# Patient Record
Sex: FEMALE | Race: WHITE | NOT HISPANIC OR LATINO | Employment: OTHER | URBAN - METROPOLITAN AREA
[De-identification: names, ages, dates, MRNs, and addresses within clinical notes are randomized per-mention and may not be internally consistent; named-entity substitution may affect disease eponyms.]

---

## 2019-12-05 ENCOUNTER — ASSISTED LIVING VISIT (OUTPATIENT)
Dept: GERIATRICS | Facility: CLINIC | Age: 79
End: 2019-12-05
Payer: COMMERCIAL

## 2019-12-05 VITALS
RESPIRATION RATE: 18 BRPM | OXYGEN SATURATION: 93 % | WEIGHT: 138 LBS | DIASTOLIC BLOOD PRESSURE: 61 MMHG | SYSTOLIC BLOOD PRESSURE: 143 MMHG | HEART RATE: 53 BPM

## 2019-12-05 DIAGNOSIS — G30.1 LATE ONSET ALZHEIMER'S DISEASE WITHOUT BEHAVIORAL DISTURBANCE (H): ICD-10-CM

## 2019-12-05 DIAGNOSIS — F02.80 LATE ONSET ALZHEIMER'S DISEASE WITHOUT BEHAVIORAL DISTURBANCE (H): ICD-10-CM

## 2019-12-05 DIAGNOSIS — I10 ESSENTIAL HYPERTENSION: ICD-10-CM

## 2019-12-05 DIAGNOSIS — R29.6 RECURRENT FALLS: Primary | ICD-10-CM

## 2019-12-05 DIAGNOSIS — N39.0 RECURRENT UTI: ICD-10-CM

## 2019-12-05 DIAGNOSIS — Z71.89 ACP (ADVANCE CARE PLANNING): ICD-10-CM

## 2019-12-05 PROBLEM — R26.81 UNSTEADY GAIT: Status: ACTIVE | Noted: 2019-12-05

## 2019-12-05 RX ORDER — AMLODIPINE BESYLATE 5 MG/1
5 TABLET ORAL DAILY
COMMUNITY
End: 2020-01-03

## 2019-12-05 RX ORDER — ACETAMINOPHEN 500 MG
1000 TABLET ORAL 2 TIMES DAILY
COMMUNITY
End: 2019-12-05

## 2019-12-05 RX ORDER — VIT C/E/ZN/COPPR/LUTEIN/ZEAXAN 60 MG-6 MG
1 CAPSULE ORAL 2 TIMES DAILY
COMMUNITY
End: 2020-01-03

## 2019-12-05 RX ORDER — HYDROCHLOROTHIAZIDE 12.5 MG/1
12.5 TABLET ORAL DAILY
COMMUNITY
End: 2020-01-03

## 2019-12-05 RX ORDER — ATENOLOL 50 MG/1
50 TABLET ORAL DAILY
COMMUNITY
End: 2020-01-03

## 2019-12-05 RX ORDER — CHOLECALCIFEROL (VITAMIN D3) 50 MCG
1 TABLET ORAL DAILY
COMMUNITY
End: 2020-02-03

## 2019-12-05 RX ORDER — LISINOPRIL 20 MG/1
20 TABLET ORAL DAILY
COMMUNITY
End: 2020-01-03

## 2019-12-05 RX ORDER — ASPIRIN 81 MG/1
81 TABLET ORAL DAILY
COMMUNITY
End: 2020-02-03

## 2019-12-05 RX ORDER — CITALOPRAM HYDROBROMIDE 10 MG/1
10 TABLET ORAL DAILY
COMMUNITY
End: 2020-01-03

## 2019-12-05 RX ORDER — NITROFURANTOIN 25; 75 MG/1; MG/1
100 CAPSULE ORAL DAILY
COMMUNITY
End: 2020-01-03

## 2019-12-05 RX ORDER — ACETAMINOPHEN 500 MG
1000 TABLET ORAL 2 TIMES DAILY
COMMUNITY
End: 2020-01-03

## 2019-12-05 RX ORDER — OMEPRAZOLE 40 MG/1
40 CAPSULE, DELAYED RELEASE ORAL DAILY
COMMUNITY
End: 2020-01-03

## 2019-12-05 NOTE — LETTER
"    12/5/2019        RE: Judith M Trierweiler [facility]  [address]  [facility] [address]        [place] [facility]  PRIMARY CARE PROVIDER AND CLINIC:  JAYLAN Morales CNP, [address] / [place] [address]  Chief Complaint   Patient presents with     Establish Care     [place] Medical Record Number:  [id number]  Place of Service where encounter took place:  [facility] [[id number]]    Judith M Trierweiler  is a 79 year old  (1940), admitted to the above facility from home..  Admitted to this facility for  rehab, medical management and nursing care.    HPI:    HPI information obtained from: facility chart records, facility staff, patient report, [facility] chart review, Care Everywhere Epic chart review and family/first contact son report.   Brief Summary of Hospital Course:   Recurrent falls. 11/4/19 s/p fall with head trauma, laceration to occipital scalp area.  Head Ct done stable.  Most recent hosp. Stay 11/30/19-12/2/19 [facility].  Had urinary incont., weakness, dehydration.  Has had increased LE weakness. Slowed, shortened step gait.  Some diff. With turning.  Uses walker, gait belt with SBA.  New riser chair in room. Current staff assist with transfers.  Reports occ dizziness. 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     Alzheimer's. Memory loss, more so over past couple mos per son. Per chart, \"mild stroke\" 9/19 with some facial drooping. LUE weakness. Seen by Neurology, eval for possible NPH per previous head CT, brain MRI. Reported to have L pronator drift, no magnetic gait associated with NPH. [place] not to be NPH, rather repeat hosp. with toxic metabolic encephalopathy  Superimposed on deg. Brain syndrome. Cont. On celexa for depression. Reports mood gen. Stable. Has ongoing STML.  Neurologist did offer neuropsych referral, however family did not want.     HTN: lower BP yesterday.  Per son, has had lower BPs more " "recently.  Recent decreased hydrochlorothiazide dose per previous PCP.  Today BP stable. Somewhat bradycardic.  Cont. On hydrochlorothiazide, lisinopril, norvasc, atenolol. Has CKD stage 3.  Appears to have MYLES during 11/19 hosp. With BUN 51, CR 1.71.  Per labs done earlier this week Cr 1.2, Bun 42, GFR 40s.      Recurrent UTI. Per son recently tx for UTI-had\"too high\" dose of antbx, had tendonitis of R knee.  Had prednisone burst. Pain resolved.  Cont. On macrobid prophylaxis dose.  Afebrile.  Had occ urinary incont.  Wears depends. occ nocturia.    ACP: no polst in chart. Discussed with son-yuri, resident. Would like DNR/DNI        Updates on Status Since Skilled nursing Admission: no falls since admission yesterday. Mood, behaviors stable. Staff to start med admin today-farhan had been giving meds.     CODE STATUS/ADVANCE DIRECTIVES DISCUSSION:   DNR / DNI  Patient's living condition: lived with spouse  ALLERGIES: Penicillins  PAST MEDICAL HISTORY:  has a past medical history of Arthritis, Cataract, Cerebral infarction (H), CKD (chronic kidney disease), Essential hypertension, benign, Gastroesophageal reflux disease, Gout, Hemorrhoid, History of duodenal ulcer, History of DVT (deep vein thrombosis), History of GI bleed, Hyperlipidemia, Macular degeneration, Psoriasis, and Vitreous hemorrhage (H).  PAST SURGICAL HISTORY:   has a past surgical history that includes NONSPECIFIC PROCEDURE; NONSPECIFIC PROCEDURE; and CATARACT.  FAMILY HISTORY: family history includes Cerebrovascular Disease in her mother; Coronary Artery Disease in her father; Hypertension in her mother; Kidney Disease in her mother.  SOCIAL HISTORY:   reports that she has never smoked. She has never used smokeless tobacco.    Post Discharge Medication Reconciliation Status: discharge medications reconciled and changed, per note/orders (see AVS)    Current Outpatient Medications   Medication Sig Dispense Refill     acetaminophen (TYLENOL) 500 MG tablet " Take 500-1,000 mg by mouth 2 times daily And every day prn       amLODIPine (NORVASC) 5 MG tablet Take 5 mg by mouth daily       aspirin 81 MG EC tablet Take 81 mg by mouth daily       atenolol (TENORMIN) 50 MG tablet Take 50 mg by mouth daily       citalopram (CELEXA) 10 MG tablet Take 10 mg by mouth daily       hydrochlorothiazide (HYDRODIURIL) 12.5 MG tablet Take 12.5 mg by mouth daily       lisinopril (PRINIVIL/ZESTRIL) 20 MG tablet Take 20 mg by mouth daily       multivitamin  with lutein (OCUVITE WITH LTEIN) CAPS per capsule Take 1 capsule by mouth 2 times daily       nitroFURantoin macrocrystal-monohydrate (MACROBID) 100 MG capsule Take 100 mg by mouth daily       omeprazole (PRILOSEC) 40 MG DR capsule Take 40 mg by mouth daily       vitamin D3 (CHOLECALCIFEROL) 2000 units (50 mcg) tablet Take 1 tablet by mouth daily         ROS:  CONSTITUTIONAL:  weight loss, fatigue, body aches, dizziness and forgetfulness, EYES:  glasses or contacts and macular deg., ENT:  [place], CV:  lower extremity edema, RESPIRATORY: neg, :  incontinence and Nocturia, GI:  occ GERD, occ diff. swallowing pills, NEURO:  weakness, PSYCH: neg, MUSCULOSKELETAL: muscular weakness and occ back pain and SKIN: neg    Vitals:  BP (!) 143/61   Pulse 53   Resp 18   Wt 62.6 kg (138 lb)   SpO2 93%   Exam:  GENERAL APPEARANCE:  Alert, in no distress, cooperative  ENT:  Mouth and posterior oropharynx normal, moist mucous membranes, [place], adequate dentition  EYES:  EOM, conjunctivae, lids, pupils and irises normal, PERRL, no drainage  NECK:  No adenopathy,masses or thyromegaly, no caroitd bruit, FROM  RESP:  respiratory effort and palpation of chest normal, lungs clear to auscultation , no respiratory distress  CV:  Palpation and auscultation of heart done , regular rate and rhythm, no murmur, rub, or gallop, peripheral edema 1+ in ankles, L>R  ABDOMEN:  normal bowel sounds, soft, nontender, no hepatosplenomegaly or other masses, no guarding or  rebound, no bruits  M/S:   short steps, slowed gait, no shuffling. some diff. with turning. muscle strength 5/5 all 4 ext., normal tone  SKIN:  Inspection of skin and subcutaneous tissue baseline, Palpation of skin and subcutaneous tissue baseline  NEURO:   Cranial nerves 2-12 are normal tested and grossly at patient's baseline, alert, speech clear  PSYCH:  memory impaired , affect and mood normal, no apparent anxiety    Lab/Diagnostic data:  Recent labs in [facility] reviewed by me today.     ASSESSMENT/PLAN:  Recurrent falls  Ongoing. Fall last month with hosp. Stay. Head trauma. Head imaging stable.  1. Refer to PT  2. Gait belt, 4ww for amb.  3. Cont. W.c., stand/pivot transfers  4. Monitor for attempts to self-transfer, falls    Late onset Alzheimer's disease without behavioral disturbance (H)  STML. S/p CVA. Recent neurology visit.  1. Cont. celexa  2. Monitor for changes in mood, behaviors  3. Monitor for changes in Neuros, focal neurological changes  4. F/u with Neurology on prn basis  5. Cont. Staff assist with ADLs, medication admin    Essential hypertension  Recent loser BPs, stable today. CKD stage 3  1. Cont. Norvasc, lisinopril, atenolol, hydrochlorothiazide  2. BPs, HRs bid x 7 days-staff to notify PCP for HR<54, SBP<100  3. For further lower BPs, may discontinue norvasc  4. For further bradycardia, may decrease atenolol  5. Cbc, bmp next week. If Cr>1.4, may decrease lisinopril, discontinue  Hydrochlorothiazide   6. Monitor for dizziness, encourage fluids    Recurrent UTI  UA done 11/30/19 hosp.stay neg.  Cont. On macrobid prophylaxis  1. Monitor for fever  2. Monitor for increased nocturia  3. Cont. Incont. Briefs  4. Son to make Urology appt  5. Cbc next week as above    ACP (advance care planning)  Spoke with resident, son-poa. DNR/DNI. POLST in chart       Electronically signed by:  JAYLAN Morales CNP                       Sincerely,        JAYLAN Morales CNP

## 2019-12-05 NOTE — PROGRESS NOTES
"[place] [facility]  PRIMARY CARE PROVIDER AND CLINIC:  Rylan Farris, APRN CNP, [address] / [place] [address]  Chief Complaint   Patient presents with     Establish Care     [place] Medical Record Number:  [id number]  Place of Service where encounter took place:  [facility]  [employer] [facility] [[id number]]    Judith M Trierweiler  is a 79 year old  (1940), admitted to the above facility from home..  Admitted to this facility for  rehab, medical management and nursing care.    HPI:    HPI information obtained from: facility chart records, facility staff, patient report, [facility] chart review, Care Everywhere Epic chart review and family/first contact son report.   Brief Summary of Hospital Course:   Recurrent falls. 11/4/19 s/p fall with head trauma, laceration to occipital scalp area.  Head Ct done stable.  Most recent hosp. Stay 11/30/19-12/2/19 [facility]. [facility].  Had urinary incont., weakness, dehydration.  Has had increased LE weakness. Slowed, shortened step gait.  Some diff. With turning.  Uses walker, gait belt with SBA.  New riser chair in room. Current staff assist with transfers.  Reports occ dizziness. 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     Alzheimer's. Memory loss, more so over past couple mos per son. Per chart, \"mild stroke\" 9/19 with some facial drooping. LUE weakness. Seen by Neurology, eval for possible NPH per previous head CT, brain MRI. Reported to have L pronator drift, no magnetic gait associated with NPH. [place] not to be NPH, rather repeat hosp. with toxic metabolic encephalopathy  Superimposed on deg. Brain syndrome. Cont. On celexa for depression. Reports mood gen. Stable. Has ongoing STML.  Neurologist did offer neuropsych referral, however family did not want.     HTN: lower BP yesterday.  Per son, has had lower BPs more recently.  Recent decreased hydrochlorothiazide dose per previous PCP.  Today BP stable. Somewhat bradycardic.  " "Cont. On hydrochlorothiazide, lisinopril, norvasc, atenolol. Has CKD stage 3.  Appears to have MYLES during 11/19 hosp. With BUN 51, CR 1.71.  Per labs done earlier this week Cr 1.2, Bun 42, GFR 40s.      Recurrent UTI. Per son recently tx for UTI-had\"too high\" dose of antbx, had tendonitis of R knee.  Had prednisone burst. Pain resolved.  Cont. On macrobid prophylaxis dose.  Afebrile.  Had occ urinary incont.  Wears depends. occ nocturia.    ACP: no polst in chart. Discussed with son-yuri, resident. Would like DNR/DNI        Updates on Status Since Skilled nursing Admission: no falls since admission yesterday. Mood, behaviors stable. Staff to start med admin today-farhan had been giving meds.     CODE STATUS/ADVANCE DIRECTIVES DISCUSSION:   DNR / DNI  Patient's living condition: lived with spouse  ALLERGIES: Penicillins  PAST MEDICAL HISTORY:  has a past medical history of Arthritis, Cataract, Cerebral infarction (H), CKD (chronic kidney disease), Essential hypertension, benign, Gastroesophageal reflux disease, Gout, Hemorrhoid, History of duodenal ulcer, History of DVT (deep vein thrombosis), History of GI bleed, Hyperlipidemia, Macular degeneration, Psoriasis, and Vitreous hemorrhage (H).  PAST SURGICAL HISTORY:   has a past surgical history that includes NONSPECIFIC PROCEDURE; NONSPECIFIC PROCEDURE; and CATARACT.  FAMILY HISTORY: family history includes Cerebrovascular Disease in her mother; Coronary Artery Disease in her father; Hypertension in her mother; Kidney Disease in her mother.  SOCIAL HISTORY:   reports that she has never smoked. She has never used smokeless tobacco.    Post Discharge Medication Reconciliation Status: discharge medications reconciled and changed, per note/orders (see AVS)    Current Outpatient Medications   Medication Sig Dispense Refill     acetaminophen (TYLENOL) 500 MG tablet Take 500-1,000 mg by mouth 2 times daily And every day prn       amLODIPine (NORVASC) 5 MG tablet Take 5 mg by " mouth daily       aspirin 81 MG EC tablet Take 81 mg by mouth daily       atenolol (TENORMIN) 50 MG tablet Take 50 mg by mouth daily       citalopram (CELEXA) 10 MG tablet Take 10 mg by mouth daily       hydrochlorothiazide (HYDRODIURIL) 12.5 MG tablet Take 12.5 mg by mouth daily       lisinopril (PRINIVIL/ZESTRIL) 20 MG tablet Take 20 mg by mouth daily       multivitamin  with lutein (OCUVITE WITH LTEIN) CAPS per capsule Take 1 capsule by mouth 2 times daily       nitroFURantoin macrocrystal-monohydrate (MACROBID) 100 MG capsule Take 100 mg by mouth daily       omeprazole (PRILOSEC) 40 MG DR capsule Take 40 mg by mouth daily       vitamin D3 (CHOLECALCIFEROL) 2000 units (50 mcg) tablet Take 1 tablet by mouth daily         ROS:  CONSTITUTIONAL:  weight loss, fatigue, body aches, dizziness and forgetfulness, EYES:  glasses or contacts and macular deg., ENT:  [place], CV:  lower extremity edema, RESPIRATORY: neg, :  incontinence and Nocturia, GI:  occ GERD, occ diff. swallowing pills, NEURO:  weakness, PSYCH: neg, MUSCULOSKELETAL: muscular weakness and occ back pain and SKIN: neg    Vitals:  BP (!) 143/61   Pulse 53   Resp 18   Wt 62.6 kg (138 lb)   SpO2 93%   Exam:  GENERAL APPEARANCE:  Alert, in no distress, cooperative  ENT:  Mouth and posterior oropharynx normal, moist mucous membranes, [place], adequate dentition  EYES:  EOM, conjunctivae, lids, pupils and irises normal, PERRL, no drainage  NECK:  No adenopathy,masses or thyromegaly, no caroitd bruit, FROM  RESP:  respiratory effort and palpation of chest normal, lungs clear to auscultation , no respiratory distress  CV:  Palpation and auscultation of heart done , regular rate and rhythm, no murmur, rub, or gallop, peripheral edema 1+ in ankles, L>R  ABDOMEN:  normal bowel sounds, soft, nontender, no hepatosplenomegaly or other masses, no guarding or rebound, no bruits  M/S:   short steps, slowed gait, no shuffling. some diff. with turning. muscle strength 5/5 all  4 ext., normal tone  SKIN:  Inspection of skin and subcutaneous tissue baseline, Palpation of skin and subcutaneous tissue baseline  NEURO:   Cranial nerves 2-12 are normal tested and grossly at patient's baseline, alert, speech clear  PSYCH:  memory impaired , affect and mood normal, no apparent anxiety    Lab/Diagnostic data:  Recent labs in [facility] reviewed by me today.     ASSESSMENT/PLAN:  Recurrent falls  Ongoing. Fall last month with hosp. Stay. Head trauma. Head imaging stable.  1. Refer to PT  2. Gait belt, 4ww for amb.  3. Cont. W.c., stand/pivot transfers  4. Monitor for attempts to self-transfer, falls    Late onset Alzheimer's disease without behavioral disturbance (H)  STML. S/p CVA. Recent neurology visit.  1. Cont. celexa  2. Monitor for changes in mood, behaviors  3. Monitor for changes in Neuros, focal neurological changes  4. F/u with Neurology on prn basis  5. Cont. Staff assist with ADLs, medication admin    Essential hypertension  Recent loser BPs, stable today. CKD stage 3  1. Cont. Norvasc, lisinopril, atenolol, hydrochlorothiazide  2. BPs, HRs bid x 7 days-staff to notify PCP for HR<54, SBP<100  3. For further lower BPs, may discontinue norvasc  4. For further bradycardia, may decrease atenolol  5. Cbc, bmp next week. If Cr>1.4, may decrease lisinopril, discontinue  Hydrochlorothiazide   6. Monitor for dizziness, encourage fluids    Recurrent UTI  UA done 11/30/19 hosp.stay neg.  Cont. On macrobid prophylaxis  1. Monitor for fever  2. Monitor for increased nocturia  3. Cont. Incont. Briefs  4. Son to make Urology appt  5. Cbc next week as above    ACP (advance care planning)  Spoke with resident, son-poa. DNR/DNI. POLST in chart       Electronically signed by:  JAYLAN Morales CNP

## 2019-12-08 ENCOUNTER — TELEPHONE (OUTPATIENT)
Dept: GERIATRICS | Facility: CLINIC | Age: 79
End: 2019-12-08

## 2019-12-08 ENCOUNTER — RECORDS - HEALTHEAST (OUTPATIENT)
Dept: LAB | Facility: CLINIC | Age: 79
End: 2019-12-08

## 2019-12-08 LAB
ALBUMIN UR-MCNC: ABNORMAL MG/DL
APPEARANCE UR: CLEAR
BACTERIA #/AREA URNS HPF: ABNORMAL HPF
BILIRUB UR QL STRIP: NEGATIVE
COLOR UR AUTO: YELLOW
GLUCOSE UR STRIP-MCNC: NEGATIVE MG/DL
HGB UR QL STRIP: NEGATIVE
KETONES UR STRIP-MCNC: NEGATIVE MG/DL
LEUKOCYTE ESTERASE UR QL STRIP: ABNORMAL
MUCOUS THREADS #/AREA URNS LPF: ABNORMAL LPF
NITRATE UR QL: NEGATIVE
PH UR STRIP: 5 [PH] (ref 4.5–8)
RBC #/AREA URNS AUTO: ABNORMAL HPF
SP GR UR STRIP: 1.02 (ref 1–1.03)
SQUAMOUS #/AREA URNS AUTO: ABNORMAL LPF
UROBILINOGEN UR STRIP-ACNC: ABNORMAL
WBC #/AREA URNS AUTO: ABNORMAL HPF

## 2019-12-08 NOTE — PROGRESS NOTES
Patient with presence of urinary incontinence, malodorous urine and increased confusion. Staff requesting UA/UC.    Dr. Misty Davis, APRN, DNP

## 2019-12-09 LAB — BACTERIA SPEC CULT: NORMAL

## 2019-12-10 ENCOUNTER — RECORDS - HEALTHEAST (OUTPATIENT)
Dept: LAB | Facility: CLINIC | Age: 79
End: 2019-12-10

## 2019-12-10 ENCOUNTER — TRANSFERRED RECORDS (OUTPATIENT)
Dept: HEALTH INFORMATION MANAGEMENT | Facility: CLINIC | Age: 79
End: 2019-12-10

## 2019-12-10 LAB
ANION GAP SERPL CALCULATED.3IONS-SCNC: 7 MMOL/L (ref 5–18)
ANION GAP SERPL CALCULATED.3IONS-SCNC: 7 MMOL/L (ref 5–18)
BASOPHILS # BLD AUTO: 0.1 THOU/UL (ref 0–0.2)
BASOPHILS NFR BLD AUTO: 1 % (ref 0–2)
BUN SERPL-MCNC: 28 MG/DL (ref 8–28)
BUN SERPL-MCNC: 28 MG/DL (ref 8–28)
CALCIUM SERPL-MCNC: 9 MG/DL (ref 8.5–10.5)
CALCIUM SERPL-MCNC: 9 MG/DL (ref 8.5–10.5)
CHLORIDE BLD-SCNC: 110 MMOL/L (ref 98–107)
CHLORIDE SERPLBLD-SCNC: 110 MMOL/L (ref 98–107)
CO2 SERPL-SCNC: 21 MMOL/L (ref 22–31)
CO2 SERPL-SCNC: 21 MMOL/L (ref 22–31)
CREAT SERPL-MCNC: 1.31 MG/DL (ref 0.6–1.1)
CREAT SERPL-MCNC: 1.31 MG/DL (ref 0.6–1.1)
EOSINOPHIL # BLD AUTO: 0.3 THOU/UL (ref 0–0.4)
EOSINOPHIL NFR BLD AUTO: 5 % (ref 0–6)
ERYTHROCYTE [DISTWIDTH] IN BLOOD BY AUTOMATED COUNT: 14.3 % (ref 11–14.5)
GFR SERPL CREATININE-BSD FRML MDRD: 39 ML/MIN/1.73M2
GFR SERPL CREATININE-BSD FRML MDRD: 39 ML/MIN/1.73M2
GLUCOSE BLD-MCNC: 74 MG/DL (ref 70–125)
GLUCOSE SERPL-MCNC: 74 MG/DL (ref 70–125)
HCT VFR BLD AUTO: 33.5 % (ref 35–47)
HGB BLD-MCNC: 10.6 G/DL (ref 12–16)
LYMPHOCYTES # BLD AUTO: 1.2 THOU/UL (ref 0.8–4.4)
LYMPHOCYTES NFR BLD AUTO: 18 % (ref 20–40)
MCH RBC QN AUTO: 31.7 PG (ref 27–34)
MCHC RBC AUTO-ENTMCNC: 31.6 G/DL (ref 32–36)
MCV RBC AUTO: 100 FL (ref 80–100)
MONOCYTES # BLD AUTO: 0.6 THOU/UL (ref 0–0.9)
MONOCYTES NFR BLD AUTO: 9 % (ref 2–10)
NEUTROPHILS # BLD AUTO: 4.4 THOU/UL (ref 2–7.7)
NEUTROPHILS NFR BLD AUTO: 67 % (ref 50–70)
PLATELET # BLD AUTO: 266 THOU/UL (ref 140–440)
PMV BLD AUTO: 10.9 FL (ref 8.5–12.5)
POTASSIUM BLD-SCNC: 4.8 MMOL/L (ref 3.5–5)
POTASSIUM SERPL-SCNC: 4.8 MMOL/L (ref 3.5–5)
RBC # BLD AUTO: 3.34 MILL/UL (ref 3.8–5.4)
SODIUM SERPL-SCNC: 138 MMOL/L (ref 136–145)
SODIUM SERPL-SCNC: 138 MMOL/L (ref 136–145)
WBC: 6.5 THOU/UL (ref 4–11)

## 2019-12-16 ENCOUNTER — ASSISTED LIVING VISIT (OUTPATIENT)
Dept: GERIATRICS | Facility: CLINIC | Age: 79
End: 2019-12-16
Payer: COMMERCIAL

## 2019-12-16 VITALS
TEMPERATURE: 97.8 F | DIASTOLIC BLOOD PRESSURE: 59 MMHG | OXYGEN SATURATION: 96 % | HEART RATE: 57 BPM | RESPIRATION RATE: 16 BRPM | SYSTOLIC BLOOD PRESSURE: 133 MMHG

## 2019-12-16 DIAGNOSIS — I10 ESSENTIAL HYPERTENSION: ICD-10-CM

## 2019-12-16 DIAGNOSIS — R29.6 RECURRENT FALLS: Primary | ICD-10-CM

## 2019-12-16 DIAGNOSIS — R19.7 DIARRHEA, UNSPECIFIED TYPE: ICD-10-CM

## 2019-12-16 RX ORDER — LOPERAMIDE HCL 2 MG
2 CAPSULE ORAL 2 TIMES DAILY PRN
COMMUNITY
End: 2020-02-03

## 2019-12-16 NOTE — PROGRESS NOTES
[place] [facility]  [place] Medical Record Number:  [id number]  Place of Service where encounter took place:  [facility]  [facility] ([facility]) [[id number]]  Chief Complaint   Patient presents with     Fall       HPI:    Judith M Trierweiler  is a 79 year old (1940), who is being seen today for an episodic care visit.  HPI information obtained from: facility chart records, facility staff, patient report and [facility] chart review. Today's concern is: falls, HTN, diarrhea.  S/p fall 12/14/19, no apparent inj.  Has had recurrent falls in apt. Self-transfers in room. Falls appear mechanical in nature. Does report occ dizziness.  Had lower BP upon admission. BPs have been gen. Stable, as has HR.  For HTN cont. On norvasc, atenolol, hydrochlorothiazide, lisinopril.  Cont. With PT for LE strengthening. Last week had episodes of diarrhea.  Started on prn imodium.  Reports no diarrhea past few days.       Past Medical and Surgical History reviewed in Epic today.    MEDICATIONS:  Current Outpatient Medications   Medication Sig Dispense Refill     acetaminophen (TYLENOL) 500 MG tablet Take 1,000 mg by mouth 2 times daily And every day prn        amLODIPine (NORVASC) 5 MG tablet Take 5 mg by mouth daily       aspirin 81 MG EC tablet Take 81 mg by mouth daily       atenolol (TENORMIN) 50 MG tablet Take 50 mg by mouth daily       citalopram (CELEXA) 10 MG tablet Take 10 mg by mouth daily       hydrochlorothiazide (HYDRODIURIL) 12.5 MG tablet Take 12.5 mg by mouth daily       lisinopril (PRINIVIL/ZESTRIL) 20 MG tablet Take 20 mg by mouth daily       loperamide (IMODIUM) 2 MG capsule Take 2 mg by mouth 2 times daily as needed for diarrhea       multivitamin  with lutein (OCUVITE WITH LTEIN) CAPS per capsule Take 1 capsule by mouth 2 times daily       nitroFURantoin macrocrystal-monohydrate (MACROBID) 100 MG capsule Take 100 mg by mouth daily       omeprazole (PRILOSEC) 40 MG DR capsule Take 40 mg by mouth  daily       vitamin D3 (CHOLECALCIFEROL) 2000 units (50 mcg) tablet Take 1 tablet by mouth daily           REVIEW OF SYSTEMS:  Unobtainable secondary to cognitive impairment. Reports feeling fine today. No pains    Objective:  /59   Pulse 57   Temp 97.8  F (36.6  C)   Resp 16   SpO2 96%   Exam:  GENERAL APPEARANCE:  Alert, in no distress, cooperative  ENT:  Mouth and posterior oropharynx normal, moist mucous membranes, [place]  EYES:  EOM, conjunctivae, lids, pupils and irises normal, PERRL  NECK:  No adenopathy,masses or thyromegaly, FROM  RESP:  respiratory effort and palpation of chest normal, lungs clear to auscultation , no respiratory distress  CV:  Palpation and auscultation of heart done , regular rate and rhythm, no murmur, rub, or gallop, peripheral edema trace-1+ in LEs, L>R  ABDOMEN:  normal bowel sounds, soft, nontender, no hepatosplenomegaly or other masses, no guarding or rebound  M/S:   gait steady with walker. short steps. normal muscle tone  NEURO:   Cranial nerves 2-12 are normal tested and grossly at patient's baseline, alert, speech clear  PSYCH:  insight and judgement impaired, memory impaired , affect and mood normal    Labs:     Most Recent 3 BMP's:  Recent Labs   Lab Test 12/10/19      POTASSIUM 4.8   CHLORIDE 110*   CO2 21*   BUN 28   CR 1.31*   ANIONGAP 7   SLIME 9.0   GLC 74       ASSESSMENT/PLAN:  Recurrent falls  S/p fall 12/14/19. No apparent inj.  Dementia with memory loss  1. Remind to use walker at all times  2. Cont. PT  3. Monitor for diff. With transfers, further increased LE weakness  4. Monitor for further c/o dizziness. Encourage fluids    Essential hypertension  BP stable. Sl bradycardia at times  1. Cont. Norvasc, atenolol, lisinopril, hydrochlorothiazide  2. Follow BPs, HRs  3. Cbc, bmp tomorrow  4. For further elevated cr., decrease hydrochlorothiazide and or lisinopril  5. For increased bradycardia, decrease atenolol    Diarrhea, unspecified type  Currently  resolved  1. Cont. Prn imodium  2. Encourage fluids as above  3. For further increased freq. Of diarrhea, may sched. imodium      Electronically signed by:  JAYLAN Morales CNP

## 2019-12-16 NOTE — LETTER
12/16/2019        RE: Judith M Trierweiler [facility] [place] [address]  [facility] [address]        [facility]  [facility] Record Number:  [id number]  Place of Service where encounter took place:  [facility] ([facility]) [[id number]]  Chief Complaint   Patient presents with     Fall       HPI:    Judith M Trierweiler  is a 79 year old (1940), who is being seen today for an episodic care visit.  HPI information obtained from: facility chart records, facility staff, patient report and [facility] chart review. Today's concern is: falls, HTN, diarrhea.  S/p fall 12/14/19, no apparent inj.  Has had recurrent falls in apt. Self-transfers in room. Falls appear mechanical in nature. Does report occ dizziness.  Had lower BP upon admission. BPs have been gen. Stable, as has HR.  For HTN cont. On norvasc, atenolol, hydrochlorothiazide, lisinopril.  Cont. With PT for LE strengthening. Last week had episodes of diarrhea.  Started on prn imodium.  Reports no diarrhea past few days.       Past Medical and Surgical History reviewed in Epic today.    MEDICATIONS:  Current Outpatient Medications   Medication Sig Dispense Refill     acetaminophen (TYLENOL) 500 MG tablet Take 1,000 mg by mouth 2 times daily And every day prn        amLODIPine (NORVASC) 5 MG tablet Take 5 mg by mouth daily       aspirin 81 MG EC tablet Take 81 mg by mouth daily       atenolol (TENORMIN) 50 MG tablet Take 50 mg by mouth daily       citalopram (CELEXA) 10 MG tablet Take 10 mg by mouth daily       hydrochlorothiazide (HYDRODIURIL) 12.5 MG tablet Take 12.5 mg by mouth daily       lisinopril (PRINIVIL/ZESTRIL) 20 MG tablet Take 20 mg by mouth daily       loperamide (IMODIUM) 2 MG capsule Take 2 mg by mouth 2 times daily as needed for diarrhea       multivitamin  with lutein (OCUVITE WITH LTEIN) CAPS per capsule Take 1 capsule by mouth 2 times daily       nitroFURantoin macrocrystal-monohydrate  (MACROBID) 100 MG capsule Take 100 mg by mouth daily       omeprazole (PRILOSEC) 40 MG DR capsule Take 40 mg by mouth daily       vitamin D3 (CHOLECALCIFEROL) 2000 units (50 mcg) tablet Take 1 tablet by mouth daily           REVIEW OF SYSTEMS:  Unobtainable secondary to cognitive impairment. Reports feeling fine today. No pains    Objective:  /59   Pulse 57   Temp 97.8  F (36.6  C)   Resp 16   SpO2 96%   Exam:  GENERAL APPEARANCE:  Alert, in no distress, cooperative  ENT:  Mouth and posterior oropharynx normal, moist mucous membranes, [place]  EYES:  EOM, conjunctivae, lids, pupils and irises normal, PERRL  NECK:  No adenopathy,masses or thyromegaly, FROM  RESP:  respiratory effort and palpation of chest normal, lungs clear to auscultation , no respiratory distress  CV:  Palpation and auscultation of heart done , regular rate and rhythm, no murmur, rub, or gallop, peripheral edema trace-1+ in LEs, L>R  ABDOMEN:  normal bowel sounds, soft, nontender, no hepatosplenomegaly or other masses, no guarding or rebound  M/S:   gait steady with walker. short steps. normal muscle tone  NEURO:   Cranial nerves 2-12 are normal tested and grossly at patient's baseline, alert, speech clear  PSYCH:  insight and judgement impaired, memory impaired , affect and mood normal    Labs:     Most Recent 3 BMP's:  Recent Labs   Lab Test 12/10/19      POTASSIUM 4.8   CHLORIDE 110*   CO2 21*   BUN 28   CR 1.31*   ANIONGAP 7   SLIME 9.0   GLC 74       ASSESSMENT/PLAN:  Recurrent falls  S/p fall 12/14/19. No apparent inj.  Dementia with memory loss  1. Remind to use walker at all times  2. Cont. PT  3. Monitor for diff. With transfers, further increased LE weakness  4. Monitor for further c/o dizziness. Encourage fluids    Essential hypertension  BP stable. Sl bradycardia at times  1. Cont. Norvasc, atenolol, lisinopril, hydrochlorothiazide  2. Follow BPs, HRs  3. Cbc, bmp tomorrow  4. For further elevated cr., decrease  hydrochlorothiazide and or lisinopril  5. For increased bradycardia, decrease atenolol    Diarrhea, unspecified type  Currently resolved  1. Cont. Prn imodium  2. Encourage fluids as above  3. For further increased freq. Of diarrhea, may sched. imodium      Electronically signed by:  JAYLAN Morales CNP             Sincerely,        JAYLAN Morales CNP

## 2019-12-18 ENCOUNTER — RECORDS - HEALTHEAST (OUTPATIENT)
Dept: LAB | Facility: CLINIC | Age: 79
End: 2019-12-18

## 2019-12-18 LAB
ANION GAP SERPL CALCULATED.3IONS-SCNC: 6 MMOL/L (ref 5–18)
BUN SERPL-MCNC: 24 MG/DL (ref 8–28)
CALCIUM SERPL-MCNC: 9.7 MG/DL (ref 8.5–10.5)
CHLORIDE BLD-SCNC: 109 MMOL/L (ref 98–107)
CO2 SERPL-SCNC: 22 MMOL/L (ref 22–31)
CREAT SERPL-MCNC: 1.25 MG/DL (ref 0.6–1.1)
ERYTHROCYTE [DISTWIDTH] IN BLOOD BY AUTOMATED COUNT: 14 % (ref 11–14.5)
GFR SERPL CREATININE-BSD FRML MDRD: 41 ML/MIN/1.73M2
GLUCOSE BLD-MCNC: 88 MG/DL (ref 70–125)
HCT VFR BLD AUTO: 34.7 % (ref 35–47)
HGB BLD-MCNC: 11 G/DL (ref 12–16)
MCH RBC QN AUTO: 31.7 PG (ref 27–34)
MCHC RBC AUTO-ENTMCNC: 31.7 G/DL (ref 32–36)
MCV RBC AUTO: 100 FL (ref 80–100)
PLATELET # BLD AUTO: 226 THOU/UL (ref 140–440)
PMV BLD AUTO: 10.5 FL (ref 8.5–12.5)
POTASSIUM BLD-SCNC: 4.6 MMOL/L (ref 3.5–5)
RBC # BLD AUTO: 3.47 MILL/UL (ref 3.8–5.4)
SODIUM SERPL-SCNC: 137 MMOL/L (ref 136–145)
WBC: 8.1 THOU/UL (ref 4–11)

## 2019-12-19 ENCOUNTER — RECORDS - HEALTHEAST (OUTPATIENT)
Dept: LAB | Facility: CLINIC | Age: 79
End: 2019-12-19

## 2019-12-19 LAB
ANION GAP SERPL CALCULATED.3IONS-SCNC: 6 MMOL/L (ref 5–18)
BUN SERPL-MCNC: 26 MG/DL (ref 8–28)
CALCIUM SERPL-MCNC: 9.2 MG/DL (ref 8.5–10.5)
CHLORIDE BLD-SCNC: 109 MMOL/L (ref 98–107)
CO2 SERPL-SCNC: 22 MMOL/L (ref 22–31)
CREAT SERPL-MCNC: 1.25 MG/DL (ref 0.6–1.1)
ERYTHROCYTE [DISTWIDTH] IN BLOOD BY AUTOMATED COUNT: 14 % (ref 11–14.5)
GFR SERPL CREATININE-BSD FRML MDRD: 41 ML/MIN/1.73M2
GLUCOSE BLD-MCNC: 80 MG/DL (ref 70–125)
HCT VFR BLD AUTO: 32.3 % (ref 35–47)
HGB BLD-MCNC: 9.9 G/DL (ref 12–16)
MCH RBC QN AUTO: 31.1 PG (ref 27–34)
MCHC RBC AUTO-ENTMCNC: 30.7 G/DL (ref 32–36)
MCV RBC AUTO: 102 FL (ref 80–100)
PLATELET # BLD AUTO: 198 THOU/UL (ref 140–440)
PMV BLD AUTO: 11.1 FL (ref 8.5–12.5)
POTASSIUM BLD-SCNC: 4.9 MMOL/L (ref 3.5–5)
RBC # BLD AUTO: 3.18 MILL/UL (ref 3.8–5.4)
SODIUM SERPL-SCNC: 137 MMOL/L (ref 136–145)
WBC: 5.8 THOU/UL (ref 4–11)

## 2020-01-02 DIAGNOSIS — E56.9 VITAMIN DEFICIENCY: ICD-10-CM

## 2020-01-02 DIAGNOSIS — F32.A DEPRESSION, UNSPECIFIED DEPRESSION TYPE: ICD-10-CM

## 2020-01-02 DIAGNOSIS — K21.9 GASTROESOPHAGEAL REFLUX DISEASE, ESOPHAGITIS PRESENCE NOT SPECIFIED: ICD-10-CM

## 2020-01-02 DIAGNOSIS — R52 PAIN: Primary | ICD-10-CM

## 2020-01-02 DIAGNOSIS — I10 ESSENTIAL HYPERTENSION: ICD-10-CM

## 2020-01-02 DIAGNOSIS — Z29.89 NEED FOR PROPHYLAXIS AGAINST URINARY TRACT INFECTION: ICD-10-CM

## 2020-01-03 RX ORDER — ATENOLOL 50 MG/1
TABLET ORAL
Qty: 30 TABLET | Refills: 11 | Status: SHIPPED | OUTPATIENT
Start: 2020-01-03 | End: 2020-02-03

## 2020-01-03 RX ORDER — OMEPRAZOLE 40 MG/1
CAPSULE, DELAYED RELEASE ORAL
Qty: 30 CAPSULE | Refills: 11 | Status: SHIPPED | OUTPATIENT
Start: 2020-01-03 | End: 2020-02-03 | Stop reason: DRUGHIGH

## 2020-01-03 RX ORDER — ASPIRIN 81 MG
TABLET,CHEWABLE ORAL
Qty: 30 TABLET | Refills: 11 | Status: SHIPPED | OUTPATIENT
Start: 2020-01-03 | End: 2020-12-01

## 2020-01-03 RX ORDER — PSEUDOEPHED/ACETAMINOPH/DIPHEN 30MG-500MG
TABLET ORAL
Qty: 120 TABLET | Refills: 11 | Status: SHIPPED | OUTPATIENT
Start: 2020-01-03 | End: 2020-12-01

## 2020-01-03 RX ORDER — CHOLECALCIFEROL (VITAMIN D3) 25 MCG
TABLET ORAL
Qty: 30 TABLET | Refills: 11 | Status: SHIPPED | OUTPATIENT
Start: 2020-01-03 | End: 2020-12-01

## 2020-01-03 RX ORDER — NITROFURANTOIN 25; 75 MG/1; MG/1
CAPSULE ORAL
Qty: 30 CAPSULE | Refills: 11 | Status: SHIPPED | OUTPATIENT
Start: 2020-01-03 | End: 2020-12-01

## 2020-01-03 RX ORDER — CITALOPRAM HYDROBROMIDE 10 MG/1
TABLET ORAL
Qty: 30 TABLET | Refills: 11 | Status: SHIPPED | OUTPATIENT
Start: 2020-01-03 | End: 2020-12-01

## 2020-01-03 RX ORDER — HYDROCHLOROTHIAZIDE 12.5 MG/1
TABLET ORAL
Qty: 30 TABLET | Refills: 11 | Status: SHIPPED | OUTPATIENT
Start: 2020-01-03 | End: 2020-12-01

## 2020-01-03 RX ORDER — VIT A/VIT C/VIT E/ZINC/COPPER 4296-226
CAPSULE ORAL
Qty: 60 CAPSULE | Refills: 11 | Status: SHIPPED | OUTPATIENT
Start: 2020-01-03 | End: 2020-12-01

## 2020-01-03 RX ORDER — AMLODIPINE BESYLATE 5 MG/1
TABLET ORAL
Qty: 30 TABLET | Refills: 11 | Status: SHIPPED | OUTPATIENT
Start: 2020-01-03 | End: 2020-03-10

## 2020-01-03 RX ORDER — LISINOPRIL 20 MG/1
TABLET ORAL
Qty: 30 TABLET | Refills: 11 | Status: SHIPPED | OUTPATIENT
Start: 2020-01-03 | End: 2020-03-10

## 2020-01-06 NOTE — TELEPHONE ENCOUNTER
MEDICAL RECORDS REQUEST   [facility]  Urology Clinic  [address] [place] [address]  PHONE: [phone]  Fax: [phone]        FUTURE VISIT INFORMATION                                                   Judith M Trierweiler, : 1940 scheduled for future visit at [facility]    APPOINTMENT INFORMATION:    Date: 20 2PM    Provider:  Jose Carvalho MD    Reason for Visit/Diagnosis: Urinary incontinence     REFERRAL INFORMATION:    Referring provider: Self    Specialty: N/A    Referring providers clinic:  N/A    Clinic contact number:  N/A    RECORDS REQUESTED FOR VISIT                                                     NOTES  STATUS/DETAILS   OFFICE NOTE from referring provider  no   OFFICE NOTE from other specialist  no   DISCHARGE SUMMARY from hospital  no   DISCHARGE REPORT from the ER  no   OPERATIVE REPORT  no   MEDICATION LIST  no     PRE-VISIT CHECKLIST      Record collection complete Yes   Appointment appropriately scheduled           (right time/right provider) Yes   MyChart activation No- Declined    Questionnaire complete If no, please explain: In process      Completed by: Treasure Colunga

## 2020-01-07 ENCOUNTER — RECORDS - HEALTHEAST (OUTPATIENT)
Dept: LAB | Facility: CLINIC | Age: 80
End: 2020-01-07

## 2020-01-07 ENCOUNTER — TRANSFERRED RECORDS (OUTPATIENT)
Dept: HEALTH INFORMATION MANAGEMENT | Facility: CLINIC | Age: 80
End: 2020-01-07

## 2020-01-07 LAB
HEMOGLOBIN: 11.5 G/DL (ref 12–16)
HGB BLD-MCNC: 11.5 G/DL (ref 12–16)

## 2020-01-13 ENCOUNTER — ASSISTED LIVING VISIT (OUTPATIENT)
Dept: GERIATRICS | Facility: CLINIC | Age: 80
End: 2020-01-13
Payer: COMMERCIAL

## 2020-01-13 VITALS
DIASTOLIC BLOOD PRESSURE: 62 MMHG | HEART RATE: 57 BPM | TEMPERATURE: 97 F | SYSTOLIC BLOOD PRESSURE: 155 MMHG | RESPIRATION RATE: 17 BRPM | WEIGHT: 138 LBS

## 2020-01-13 DIAGNOSIS — R19.7 DIARRHEA, UNSPECIFIED TYPE: ICD-10-CM

## 2020-01-13 DIAGNOSIS — I12.9 BENIGN HYPERTENSION WITH CKD (CHRONIC KIDNEY DISEASE) STAGE III (H): Primary | ICD-10-CM

## 2020-01-13 DIAGNOSIS — N39.0 RECURRENT UTI: ICD-10-CM

## 2020-01-13 DIAGNOSIS — R29.6 RECURRENT FALLS: ICD-10-CM

## 2020-01-13 DIAGNOSIS — F02.80 LATE ONSET ALZHEIMER'S DISEASE WITHOUT BEHAVIORAL DISTURBANCE (H): ICD-10-CM

## 2020-01-13 DIAGNOSIS — K27.9 PUD (PEPTIC ULCER DISEASE): ICD-10-CM

## 2020-01-13 DIAGNOSIS — F32.A DEPRESSION, UNSPECIFIED DEPRESSION TYPE: ICD-10-CM

## 2020-01-13 DIAGNOSIS — N18.30 BENIGN HYPERTENSION WITH CKD (CHRONIC KIDNEY DISEASE) STAGE III (H): Primary | ICD-10-CM

## 2020-01-13 DIAGNOSIS — G30.1 LATE ONSET ALZHEIMER'S DISEASE WITHOUT BEHAVIORAL DISTURBANCE (H): ICD-10-CM

## 2020-01-13 DIAGNOSIS — M15.0 PRIMARY OSTEOARTHRITIS INVOLVING MULTIPLE JOINTS: ICD-10-CM

## 2020-01-13 NOTE — LETTER
"    1/13/2020        RE: Judith M Trierweiler  [facility]  [address]  [facility] [address]        Judith M Trierweiler is a 79 year old female seen January 13, 2020 at [facility] where she has resided for one month (admit 12/2019) seen for initial visit.   Pt is seen in her room up to recliner.   States she is feeling well and denies any current pain or other symptoms.     Talked with son Edgar and daughter Jessica on the phone.  Concerns include diarrhea in the mornings, and increased LE edema since hydrochlorothiazide dose was decreased.     Pt is fairly confused, does not recognize herself in a picture in her apartment.   She has carried a dx of dementia since 2016, last seen by Neurology in October 2019 specifically to address possibility of NPH as noted on MRI:  Neurologist thought this dx unlikely.     Decline in mobility has been present over the past year, has had TCU stays and [facility] PHYSICAL THERAPY     Pt has had multiple hospitalizations in the past year including for a bleeding ulcer, and again for right LE DVT (records not available on Care Everywhere)    She is on 4 medications for HTN and has secondary CKD3   ED visit 11/4 following a fall.       Past Medical History:   Diagnosis Date     Arthritis      Cataract      Cerebral infarction (H)      CKD (chronic kidney disease)      Essential hypertension, benign      Gastroesophageal reflux disease      Gout      Hemorrhoid      History of duodenal ulcer      History of DVT (deep vein thrombosis)      History of GI bleed      Hyperlipidemia      Macular degeneration      Psoriasis      Vitreous hemorrhage (H)      Past Surgical History:   Procedure Laterality Date     C NONSPECIFIC PROCEDURE      Hysterectomy \"pre-cancer\"     C NONSPECIFIC PROCEDURE      cholecystectomy     CATARACT       SH:   Ed still lives in their home in [facility]    Son Edgar lives in [facility], also has a daughter Jessica.     ROS: ambulatory with FWW  Wt " Readings from Last 5 Encounters:   01/13/20 62.6 kg (138 lb)   12/05/19 62.6 kg (138 lb)        EXAM:  NAD  BP (!) 155/62   Pulse 57   Temp 97  F (36.1  C)   Resp 17   Wt 62.6 kg (138 lb)    Neck supple without adenopathy  Lungs clear bilaterally with fair air movement  Heart RRR s1s2  Jose Raul soft, NT, no distention, +BS  Ext 1+ edema to knees, no skin changes or open areas.   Neuro: no focal deficits, speech normal, +STML and disorientation  Psych: affect okay     Last Comprehensive Metabolic Panel:  Sodium   Date Value Ref Range Status   12/10/2019 138 136 - 145 mmol/L Final     Potassium   Date Value Ref Range Status   12/10/2019 4.8 3.5 - 5.0 mmol/L Final     Chloride   Date Value Ref Range Status   12/10/2019 110 (A) 98 - 107 mmol/L Final     Carbon Dioxide   Date Value Ref Range Status   12/10/2019 21 (A) 22 - 31 mmol/L Final     Anion Gap   Date Value Ref Range Status   12/10/2019 7 5 - 18 mmol/L Final     Glucose   Date Value Ref Range Status   12/10/2019 74 70 - 125 mg/dL Final     Urea Nitrogen   Date Value Ref Range Status   12/10/2019 28 8 - 28 mg/dL Final     Creatinine   Date Value Ref Range Status   12/10/2019 1.31 (A) 0.60 - 1.10 mg/dL Final     GFR Estimate   Date Value Ref Range Status   12/10/2019 39 (L) >60 ml/min/1.73m2 Final     Calcium   Date Value Ref Range Status   12/10/2019 9.0 8.5 - 10.5 mg/dL Final     Lab Results   Component Value Date    HGB 11.5 01/07/2020     CT HEAD WO IV CONTRAST (11/04/2019 6:53 PM CST)       HISTORY: Fell backwards, hit the back of her head  FINDINGS:    There is cerebral atrophy. Ventricles are disproportionately prominent compared to the sulcal atrophy. The configuration however is unchanged compared to 09/09/2019 outside study. No midline shift. Basal cisterns are patent. No intraaxial or extraaxial fluid collection. No acute intracranial hemorrhage. The gray-white matter differentiation is maintained. The calvarium is intact. The visualized paranasal  sinuses, mastoid air cells, and middle ear cavities are clear.      IMPRESSION:    1. No acute intracranial hemorrhage or calvarial fracture.    2. Ventriculomegaly out of proportion to the degree of sulcal atrophy. This is nonspecific and could be related to greater central atrophy though NPH would be a consideration if clinically correlated. The appearance is similar to 09/09/2019.                IMP/PLAN:   (I12.9,  N18.3) Benign hypertension with CKD (chronic kidney disease) stage III (H)    Comment: bps higher, hydrochlorothiazide dose decreased secondary to CKD     Plan: change atenolol to metoprolol 25 mg bid  Continue amlodipine 5 mg/day, lisinopril 20 mg /day and hydrochlorothiazide 12.5 mg/day   Follow BMP.     (G30.1,  F02.80) Late onset Alzheimer's disease without behavioral disturbance (H)  Comment: gradual decline in cognition, language and mobility with low functional status   Plan: AL support for meds, meals, activity    (K27.9) PUD (peptic ulcer disease)  Comment: no recent bleed, hgb stable    Plan: decrease omeprazole to 20 mg/day and follow.       (R29.6) Recurrent falls  Comment: LE weakness, poor safety awareness  Plan: [facility] PHYSICAL THERAPY for balance, gait, transfers  Ambulation with FWW to all destinations    (R19.7) Diarrhea, unspecified type  Comment: unclear etiology, son reports this is still a problem in the mornings     Plan: decrease omeprazole to 20 mg/day  Scheduled Imodium one dose in AM and continue prn, follow up closely, workup if persists or worsens.      (N39.0) Recurrent UTI  Comment: by history, no details available     Plan: remains on prophylactic dose of nitrofurantoin daily.    She has Urology appointment scheduled for 2/5/20      (F32.9) Depression, unspecified depression type  Comment: by history   Plan: continue citalopram 10 mg/day       (M15.0) Primary osteoarthritis involving multiple joints  Comment: decreased mobility   Plan: scheduled acetaminophen, ambulation  with FWW     Vale Lawrence MD       Sincerely,        Vale Lawrence MD

## 2020-01-13 NOTE — PROGRESS NOTES
"Judith M Trierweiler is a 79 year old female seen January 13, 2020 at [facility] where she has resided for one month (admit 12/2019) seen for initial visit.   Pt is seen in her room up to recliner.   States she is feeling well and denies any current pain or other symptoms.     Talked with son Edgar and daughter Jesscia on the phone.  Concerns include diarrhea in the mornings, and increased LE edema since hydrochlorothiazide dose was decreased.     Pt is fairly confused, does not recognize herself in a picture in her apartment.   She has carried a dx of dementia since 2016, last seen by Neurology in October 2019 specifically to address possibility of NPH as noted on MRI:  Neurologist thought this dx unlikely.     Decline in mobility has been present over the past year, has had TCU stays and [facility] PHYSICAL THERAPY     Pt has had multiple hospitalizations in the past year including for a bleeding ulcer, and again for right LE DVT (records not available on Care Everywhere)    She is on 4 medications for HTN and has secondary CKD3   ED visit 11/4 following a fall.       Past Medical History:   Diagnosis Date     Arthritis      Cataract      Cerebral infarction (H)      CKD (chronic kidney disease)      Essential hypertension, benign      Gastroesophageal reflux disease      Gout      Hemorrhoid      History of duodenal ulcer      History of DVT (deep vein thrombosis)      History of GI bleed      Hyperlipidemia      Macular degeneration      Psoriasis      Vitreous hemorrhage (H)      Past Surgical History:   Procedure Laterality Date     C NONSPECIFIC PROCEDURE      Hysterectomy \"pre-cancer\"     C NONSPECIFIC PROCEDURE      cholecystectomy     CATARACT       SH:   Ed still lives in their home in [facility]    Son Edgar lives in [facility], also has a daughter Jessica.     ROS: ambulatory with FWW  Wt Readings from Last 5 Encounters:   01/13/20 62.6 kg (138 lb)   12/05/19 62.6 kg (138 lb)        EXAM:  NAD  BP (!) " 155/62   Pulse 57   Temp 97  F (36.1  C)   Resp 17   Wt 62.6 kg (138 lb)    Neck supple without adenopathy  Lungs clear bilaterally with fair air movement  Heart RRR s1s2  Jose Raul soft, NT, no distention, +BS  Ext 1+ edema to knees, no skin changes or open areas.   Neuro: no focal deficits, speech normal, +STML and disorientation  Psych: affect okay     Last Comprehensive Metabolic Panel:  Sodium   Date Value Ref Range Status   12/10/2019 138 136 - 145 mmol/L Final     Potassium   Date Value Ref Range Status   12/10/2019 4.8 3.5 - 5.0 mmol/L Final     Chloride   Date Value Ref Range Status   12/10/2019 110 (A) 98 - 107 mmol/L Final     Carbon Dioxide   Date Value Ref Range Status   12/10/2019 21 (A) 22 - 31 mmol/L Final     Anion Gap   Date Value Ref Range Status   12/10/2019 7 5 - 18 mmol/L Final     Glucose   Date Value Ref Range Status   12/10/2019 74 70 - 125 mg/dL Final     Urea Nitrogen   Date Value Ref Range Status   12/10/2019 28 8 - 28 mg/dL Final     Creatinine   Date Value Ref Range Status   12/10/2019 1.31 (A) 0.60 - 1.10 mg/dL Final     GFR Estimate   Date Value Ref Range Status   12/10/2019 39 (L) >60 ml/min/1.73m2 Final     Calcium   Date Value Ref Range Status   12/10/2019 9.0 8.5 - 10.5 mg/dL Final     Lab Results   Component Value Date    HGB 11.5 01/07/2020     CT HEAD WO IV CONTRAST (11/04/2019 6:53 PM CST)       HISTORY: Fell backwards, hit the back of her head  FINDINGS:    There is cerebral atrophy. Ventricles are disproportionately prominent compared to the sulcal atrophy. The configuration however is unchanged compared to 09/09/2019 outside study. No midline shift. Basal cisterns are patent. No intraaxial or extraaxial fluid collection. No acute intracranial hemorrhage. The gray-white matter differentiation is maintained. The calvarium is intact. The visualized paranasal sinuses, mastoid air cells, and middle ear cavities are clear.      IMPRESSION:    1. No acute intracranial hemorrhage or  calvarial fracture.    2. Ventriculomegaly out of proportion to the degree of sulcal atrophy. This is nonspecific and could be related to greater central atrophy though NPH would be a consideration if clinically correlated. The appearance is similar to 09/09/2019.                IMP/PLAN:   (I12.9,  N18.3) Benign hypertension with CKD (chronic kidney disease) stage III (H)    Comment: bps higher, hydrochlorothiazide dose decreased secondary to CKD     Plan: change atenolol to metoprolol 25 mg bid  Continue amlodipine 5 mg/day, lisinopril 20 mg /day and hydrochlorothiazide 12.5 mg/day   Follow BMP.     (G30.1,  F02.80) Late onset Alzheimer's disease without behavioral disturbance (H)  Comment: gradual decline in cognition, language and mobility with low functional status   Plan: AL support for meds, meals, activity    (K27.9) PUD (peptic ulcer disease)  Comment: no recent bleed, hgb stable    Plan: decrease omeprazole to 20 mg/day and follow.       (R29.6) Recurrent falls  Comment: LE weakness, poor safety awareness  Plan: [facility] PHYSICAL THERAPY for balance, gait, transfers  Ambulation with FWW to all destinations    (R19.7) Diarrhea, unspecified type  Comment: unclear etiology, son reports this is still a problem in the mornings     Plan: decrease omeprazole to 20 mg/day  Scheduled Imodium one dose in AM and continue prn, follow up closely, workup if persists or worsens.      (N39.0) Recurrent UTI  Comment: by history, no details available     Plan: remains on prophylactic dose of nitrofurantoin daily.    She has Urology appointment scheduled for 2/5/20      (F32.9) Depression, unspecified depression type  Comment: by history   Plan: continue citalopram 10 mg/day       (M15.0) Primary osteoarthritis involving multiple joints  Comment: decreased mobility   Plan: scheduled acetaminophen, ambulation with FWW     Vale Lawrence MD

## 2020-01-30 ENCOUNTER — PRE VISIT (OUTPATIENT)
Dept: UROLOGY | Facility: CLINIC | Age: 80
End: 2020-01-30

## 2020-01-30 NOTE — TELEPHONE ENCOUNTER
Reason for Visit: Consult    Diagnosis: urinary incontinence    Orders/Procedures/Records: in system    Contact Patient: n/a    Rooming Requirements: UA dip / PVR      Mari Finley LPN  01/30/20  2:00 PM

## 2020-01-31 ENCOUNTER — APPOINTMENT (OUTPATIENT)
Dept: GENERAL RADIOLOGY | Facility: CLINIC | Age: 80
End: 2020-01-31
Attending: EMERGENCY MEDICINE
Payer: COMMERCIAL

## 2020-01-31 ENCOUNTER — APPOINTMENT (OUTPATIENT)
Dept: CT IMAGING | Facility: CLINIC | Age: 80
End: 2020-01-31
Attending: EMERGENCY MEDICINE
Payer: COMMERCIAL

## 2020-01-31 ENCOUNTER — HOSPITAL ENCOUNTER (EMERGENCY)
Facility: CLINIC | Age: 80
Discharge: HOME | End: 2020-01-31
Attending: EMERGENCY MEDICINE | Admitting: EMERGENCY MEDICINE
Payer: COMMERCIAL

## 2020-01-31 VITALS — HEART RATE: 57 BPM | OXYGEN SATURATION: 100 % | SYSTOLIC BLOOD PRESSURE: 127 MMHG | DIASTOLIC BLOOD PRESSURE: 69 MMHG

## 2020-01-31 DIAGNOSIS — S00.03XA CONTUSION OF FACE, SCALP AND NECK, INITIAL ENCOUNTER: ICD-10-CM
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
## 2020-01-31 DIAGNOSIS — S20.212A CHEST WALL CONTUSION, LEFT, INITIAL ENCOUNTER: ICD-10-CM

## 2020-01-31 DIAGNOSIS — S00.83XA CONTUSION OF FACE, SCALP AND NECK, INITIAL ENCOUNTER: ICD-10-CM

## 2020-01-31 DIAGNOSIS — S10.93XA CONTUSION OF FACE, SCALP AND NECK, INITIAL ENCOUNTER: ICD-10-CM

## 2020-01-31 PROCEDURE — 70450 CT HEAD/BRAIN W/O DYE: CPT

## 2020-01-31 PROCEDURE — 99285 EMERGENCY DEPT VISIT HI MDM: CPT | Mod: 25

## 2020-01-31 PROCEDURE — 71046 X-RAY EXAM CHEST 2 VIEWS: CPT

## 2020-01-31 SDOH — HEALTH STABILITY: MENTAL HEALTH: HOW OFTEN DO YOU HAVE A DRINK CONTAINING ALCOHOL?: NEVER

## 2020-01-31 ASSESSMENT — ENCOUNTER SYMPTOMS
WOUND: 1
ABDOMINAL PAIN: 0
BACK PAIN: 0
NECK PAIN: 0

## 2020-01-31 NOTE — ED NOTES
Bed: HW03  Expected date: 1/31/20  Expected time: 12:27 PM  Means of arrival: Ambulance  Comments:  79 [facility]

## 2020-01-31 NOTE — ED AVS SNAPSHOT
[facility] Emergency Department  [address]  [facility] [address]  Phone:  [phone]  Fax:  [phone]                                    Judith M Trierweiler   MRN: [id number]    Department:  [facility] Emergency Department   Date of Visit:  1/31/2020           After Visit Summary Signature Page    I have received my discharge instructions, and my questions have been answered. I have discussed any challenges I see with this plan with the nurse or doctor.    ..........................................................................................................................................  Patient/Patient Representative Signature      ..........................................................................................................................................  Patient Representative Print Name and Relationship to Patient    ..................................................               ................................................  Date                                   Time    ..........................................................................................................................................  Reviewed by Signature/Title    ...................................................              ..............................................  Date                                               Time          22EPIC Rev 08/18

## 2020-01-31 NOTE — ED PROVIDER NOTES
"  History     Chief Complaint:  Fall    History limited due to patient's dementia.    HPI   Judith M Trierweiler is a 79 year old female with a history of hypertension, hyperlipidemia, chronic kidney disease, arthritis, and osteoarthritis among others who presents to the emergency department today for evaluation following a fall. The patient reports that she had been walking to answer her phone when she tripped \"over [her] own two feet,\" falling to the ground and obtaining an abrasion to her left cheek as she did so. Here the patient endorses facial pain but denies any additional pain including to her wrist, shoulder, chest, abdomen, spine, or neck, as well as loss of consciousness or use of blood thinners.     Allergies:  Penicillins     Medications:    Norvasc  Aspirin  Tenormin  Celexa  Hydrodiuril  Lisinopril  Imodium  Prilosec  Macrobid  Denies blood thinner use    Past Medical History:    Primary osteoarthritis  Peptic ulcer disease  Late onset Alzheimer disease  Arthritis  Cataract  Cerebral infarction  Chronic kidney disease  Hypertension  Gastroesophageal reflux disease  Gout   Hemorrhoid  Duodenal ulcer  Deep vein thrombosis  GI bleed  Hyperlipidemia  Macular degeneration  Psoriasis   Vitreous hemorrhage    Past Surgical History:    Hysterectomy  Cholecystectomy  Cataract    Family History:    Mother: cerebrovascular disease, hypertension, kidney disease  Father: coronary artery disease, hypertension     Social History:  The patient was accompanied to the ED by EMS.  Smoking Status: Never Smoker  Smokeless Tobacco: Never Used  PCP: Rylan Farris  Marital Status:      Review of Systems   HENT:        Facial pain   Cardiovascular: Negative for chest pain.   Gastrointestinal: Negative for abdominal pain.   Musculoskeletal: Negative for back pain and neck pain.        No wrist, shoulder pain   Skin: Positive for wound.   Neurological:        No loss of consciousness   All other systems " "reviewed and are negative.      Physical Exam     Patient Vitals for the past 24 hrs:   BP Pulse SpO2   01/31/20 1445 -- -- 100 %   01/31/20 1430 -- -- 100 %   01/31/20 1415 -- -- 99 %   01/31/20 1400 127/69 57 --     Physical Exam  General: Patient is alert and interactive when I enter the room  Head:  The scalp, face, and head appear normal. Contusion abrasion to left face.   Eyes:  The pupils are equal, round, and reactive to light    Conjunctivae and sclerae are normal  ENT:    No hemotympanum or signs basilar skull fracture    The oropharynx is normal without erythema.     Uvula is in the midline. Midface stable to palpation.   Neck:  Normal range of motion  CV:  Regular rate. S1/S2. No murmurs.   Resp:  Lungs are clear without wheezes or rales. No distress. No crepitance.   GI:  Abdomen is soft, no rigidity, guarding, or rebound. No contusion.    No distension. No tenderness to palpation in any quadrant.     MS:  Normal tone. Joints grossly normal without effusions.     No asymmetric leg swelling, calf or thigh tenderness.      Normal motor assessment of all extremities.    PROM performed of all major joints without pain.    No C/T/L tenderness in midline or laterally, spines cleared     after no imaging.  Left lateral/back chest wall tenderness present.    Skin:  No rash or lesions noted. Normal capillary refill noted  Neuro: Speech is normal and fluent. Face is symmetric.     Moving all extremities well. Strength is normal and symmetric.     GCS 15.  CN\"s II-XII intact.    Psych:  Awake. Alert.  Normal affect.  Appropriate interactions.  Lymph: No anterior cervical lymphadenopathy noted    Emergency Department Course     Imaging:  Radiology findings were communicated with the patient who voiced understanding of the findings.    XR Chest 2 Views  No acute cardiopulmonary disease.  Reading per radiology    CT Head w/o Contrast  1. No evidence for intracranial hemorrhage or skull fracture.  2. Ventriculomegaly " out-of-proportion of the subarachnoid spaces. Findings are probably due to a communicating type of hydrocephalus or normal pressure hydrocephalus.  3. Minimal nonspecific white matter changes.  MADISON FOX MD  Reading per radiology    Emergency Department Course:    1303 Nursing notes and vitals reviewed.    1306 I performed an exam of the patient as documented above.     1330 The patient was sent for a CT of the head while in the emergency department, results above.     1336 The patient was sent for a chest xray while in the emergency department, results above.     1450 Patient rechecked and updated.      Findings and plan explained to the patient. Patient discharged home with instructions regarding supportive care, medications, and reasons to return. The importance of close follow-up was reviewed.     Impression & Plan      Medical Decision Making:  Judith M Trierweiler is a 79 year old female who presents for evaluation after fall with trauma to the head.  Her exam shows a contusion to the head. This patient has a history and clinical exam consistent with contusion to head. Less likely concussion given scenario but discussed with patient.  The differential diagnosis includes skull fracture, epidural hematoma, subdural hematoma, intracerebral hemorrhage, and traumatic subarachnoid hemorrhage; all of these are highly unlikely in this clinical setting given negative CT.  Risk of delayed bleed is low but patient informed of possiblity.  CT done given age and clinical concern.  No signs of laceration.  I doubt underlying skull fracture.     Return to ED for red flags (change in behavior, severe headache, drowsiness, seizures, vomiting, etc) and gave head contusion precautions for home.  I did stress importance of avoiding a second blow to head just in case she has a concussion.  Her head to toe trauma exam is otherwise negative for serious underlying disease of the head, neck, chest, abdomen, extremities, pelvis.    She has contusions also to face and chest; xray negative. Doubt occult ptx/rell. Follow-up per discharge instructions.     Already had workup for NPH so will not refer to neurology.                   Diagnosis:    ICD-10-CM    1. Fall, initial encounter W19.XXXA    2. Contusion of face, scalp and neck, initial encounter S00.83XA     S00.03XA     S10.93XA    3. Chest wall contusion, left, initial encounter S20.212A      Disposition:   The patient is discharged to home.    Scribe Disclosure:  I, Leena Schreiber, am serving as a scribe at 1:27 PM on 1/31/2020 to document services personally performed by Vickey Falk MD based on my observations and the provider's statements to me.    [facility] EMERGENCY DEPARTMENT       Vickey Falk MD  01/31/20 1522

## 2020-01-31 NOTE — ED TRIAGE NOTES
"Patient arrived at around 13:00 complaining of mechanical fall. Patient reports \"I'm just stubborn\" and did not ask for help getting up despite education at her facility regarding the need to get up with help. Patient has shallow laceration to left lateral face with controlled bleeding. Alert and oriented. Denies loss of consciousness. Left upper shoulder pain as well. Denies shortness of breath. ABCs intact. Alert and oriented Xr. Oriented to room and call light. Patient educated about hand hygiene practices.    "

## 2020-02-02 DIAGNOSIS — K21.9 GASTROESOPHAGEAL REFLUX DISEASE, ESOPHAGITIS PRESENCE NOT SPECIFIED: ICD-10-CM

## 2020-02-02 DIAGNOSIS — I10 ESSENTIAL HYPERTENSION: Primary | ICD-10-CM

## 2020-02-03 ENCOUNTER — ASSISTED LIVING VISIT (OUTPATIENT)
Dept: GERIATRICS | Facility: CLINIC | Age: 80
End: 2020-02-03
Payer: COMMERCIAL

## 2020-02-03 VITALS
DIASTOLIC BLOOD PRESSURE: 61 MMHG | TEMPERATURE: 97.6 F | SYSTOLIC BLOOD PRESSURE: 125 MMHG | HEART RATE: 61 BPM | RESPIRATION RATE: 18 BRPM | OXYGEN SATURATION: 94 %

## 2020-02-03 DIAGNOSIS — G30.1 LATE ONSET ALZHEIMER'S DISEASE WITHOUT BEHAVIORAL DISTURBANCE (H): ICD-10-CM

## 2020-02-03 DIAGNOSIS — R19.7 DIARRHEA, UNSPECIFIED TYPE: ICD-10-CM

## 2020-02-03 DIAGNOSIS — R29.6 RECURRENT FALLS: Primary | ICD-10-CM

## 2020-02-03 DIAGNOSIS — F02.80 LATE ONSET ALZHEIMER'S DISEASE WITHOUT BEHAVIORAL DISTURBANCE (H): ICD-10-CM

## 2020-02-03 RX ORDER — LOPERAMIDE HCL 2 MG
2 CAPSULE ORAL 2 TIMES DAILY
COMMUNITY
End: 2020-12-15

## 2020-02-03 RX ORDER — METOPROLOL TARTRATE 25 MG/1
TABLET, FILM COATED ORAL
Qty: 62 TABLET | Status: SHIPPED | OUTPATIENT
Start: 2020-02-03 | End: 2020-03-10

## 2020-02-03 RX ORDER — METOPROLOL TARTRATE 25 MG/1
25 TABLET, FILM COATED ORAL 2 TIMES DAILY
COMMUNITY
End: 2021-03-08

## 2020-02-03 NOTE — PROGRESS NOTES
[facility]  PRIMARY CARE PROVIDER AND CLINIC:  Rylan Farris, JAYLAN CNP, [address] / [place] [address]  Chief Complaint   Patient presents with     ER F/U     [facility] Medical Record Number:  [id number]  Place of Service where encounter took place:  [facility]  [facility] [[id number]]    Judith M Trierweiler  is a 79 year old  (1940), returned to the above facility from  [facility]. Hospital stay 1/31/2020 - 1/31/2020. .  Admitted to this facility for  rehab, medical management and nursing care.    HPI:    HPI information obtained from: facility chart records, facility staff, patient report, [facility] chart review and family/first contact son report.   Brief Summary of Hospital Course:   S/p fall 1/31/20 with L face trauma, c/o rib pain. Sent to [facility] ED.  CXR neg. Head CT showed ventriculomegaly out of proportion with with degree of Sulci atrophy. Results consistent with previous head imaging.  Did have Neuro consult 10/19. Did not think changes due to NPH. Thought to wang mixed dementia, vascular, possibly alzheimer's. At that time family did not want further Neuro appt.  Has had recurrent falls. No recent changes in gait. Has memory loss due to alzheimer's.  Does not always remember to use walker. Not not remember learned techniques for best transfers.  Per son has had ongoing loose stools in am.  Recent started on sched. Am imodium dose.           Updates on Status Since Skilled nursing Admission: no fall since readmission. neuros intact. No reports of pain    CODE STATUS/ADVANCE DIRECTIVES DISCUSSION:   DNR / DNI  Patient's living condition: lives in an assisted living facility  ALLERGIES: Penicillins  PAST MEDICAL HISTORY:  has a past medical history of Arthritis, Cataract, Cerebral infarction (H), CKD (chronic kidney disease), Essential hypertension, benign, Falls frequently, Gastroesophageal reflux disease, Gout, Hemorrhoid, History of  duodenal ulcer, History of DVT (deep vein thrombosis), History of GI bleed, Hyperlipidemia, Macular degeneration, Psoriasis, and Vitreous hemorrhage (H).  PAST SURGICAL HISTORY:   has a past surgical history that includes NONSPECIFIC PROCEDURE; NONSPECIFIC PROCEDURE; and CATARACT.  FAMILY HISTORY: family history includes Cerebrovascular Disease in her mother; Coronary Artery Disease in her father; Hypertension in her mother; Kidney Disease in her mother.  SOCIAL HISTORY:   reports that she has never smoked. She has never used smokeless tobacco. She reports that she does not drink alcohol or use drugs.    Post Discharge Medication Reconciliation Status: discharge medications reconciled and changed, per note/orders (see AVS)        ROS:  CONSTITUTIONAL:  forgetfulness, EYES:  glasses or contacts, ENT:  [place], CV:  lower extremity edema, RESPIRATORY: neg, GI:  diarrhea, NEURO:  neg, PSYCH: neg, MUSCULOSKELETAL: neg and SKIN: bruising    Vitals:  /61   Pulse 61   Temp 97.6  F (36.4  C)   Resp 18   SpO2 94%   Exam:  GENERAL APPEARANCE:  Alert, in no distress, cooperative  ENT:  Mouth and posterior oropharynx normal, moist mucous membranes, [place]  EYES:  EOM, conjunctivae, lids, pupils and irises normal, PERRL  NECK:  No adenopathy,masses or thyromegaly, FROM, no carotid bruit  RESP:  respiratory effort and palpation of chest normal, lungs clear to auscultation , no respiratory distress  CV:  Palpation and auscultation of heart done , regular rate and rhythm, no murmur, rub, or gallop, peripheral edema 1+ in LEs  ABDOMEN:  normal bowel sounds, soft, nontender, no hepatosplenomegaly or other masses, no guarding or rebound  M/S:   gait slowed, steady with walker. sl. shuffling gait.   SKIN:  bruising below L eye, superficial abrasion above L eye  NEURO:   Cranial nerves 2-12 are normal tested and grossly at patient's baseline, alert, speech clear  PSYCH:  insight and judgement impaired, memory impaired , affect and  mood normal    Lab/Diagnostic data:    Most Recent 3 CBC's:  Recent Labs   Lab Test 01/07/20   HGB 11.5*     Most Recent 3 BMP's:  Recent Labs   Lab Test 12/10/19      POTASSIUM 4.8   CHLORIDE 110*   CO2 21*   BUN 28   CR 1.31*   ANIONGAP 7   SLIME 9.0   GLC 74       ASSESSMENT/PLAN:  Recurrent falls  S/p fall 1/31/20. Facial bruising. No c/o rib pain today. Resp. Status stable  1. Son to maker Neurology appt with [facility]  2. Monitor for changes in Neuros  3. Refer to PT/OT  4. Cont. To remind to use walker at all times. Cont w.c. when out of apt    Late onset Alzheimer's disease without behavioral disturbance (H)  Memory loss. Slowed gait. No recent reports of increased depression  1. Cont. celexa  2. Neuro as above  3. OT for eval with ADLs  4. Monitor for changes in mood, behaviors    Diarrhea, unspecified type  Ongoing s/s  1. Increase sched. Imodium to bid  2. Cont. Prn imodium  3. Encourage fluids  4. Bmp in next 2 weeks         Electronically signed by:  JAYLAN Morales CNP         Documentation of Face-to-Face and Certification for Home Health Services     Patient: Judith M Trierweiler   YOB: 1940  MR Number: [id number]  Today's Date: 2/3/2020    I certify that patient: Judith M Trierweiler is under my care and that I, or a nurse practitioner or physician's assistant working with me, had a face-to-face encounter that meets the physician face-to-face encounter requirements with this patient on: 2/3/2020.    This encounter with the patient was in whole, or in part, for the following medical condition, which is the primary reason for home health care: LE weakness  .    I certify that, based on my findings, the following services are medically necessary home health services: Occupational Therapy and Physical Therapy.    My clinical findings support the need for the above services because: Occupational Therapy Services are needed to assess and treat  cognitive ability and address ADL safety due to impairment in memory. and Physical Therapy Services are needed to assess and treat the following functional impairments: LE weakness.    Further, I certify that my clinical findings support that this patient is homebound (i.e. absences from home require considerable and taxing effort and are for medical reasons or [employer] services or infrequently or of short duration when for other reasons) because: LE weakness, uses w.c. when out of apt.    Based on the above findings. I certify that this patient is confined to the home and needs intermittent skilled nursing care, physical therapy and/or speech therapy.  The patient is under my care, and I have initiated the establishment of the plan of care.  This patient will be followed by a physician who will periodically review the plan of care.  Physician/Provider to provide follow up care: Rylan Farris    Responsible Medicare certified PECOS Physician: Vale Lawrence  Physician Signature: See electronic signature associated with these discharge orders.  Date: 2/3/2020

## 2020-02-03 NOTE — LETTER
2/3/2020        RE: Judith M Trierweiler [facility] [place]  [address]  [place] [address]        [facility]  PRIMARY CARE PROVIDER AND CLINIC:  JAYLAN Morales CNP, [address] / [place] [address]  Chief Complaint   Patient presents with     ER F/U     [facility] Medical Record Number:  [id number]  Place of Service where encounter took place:  [facility] [[id number]]    Judith M Trierweiler  is a 79 year old  (1940), returned to the above facility from  [facility]. Hospital stay 1/31/2020 - 1/31/2020. .  Admitted to this facility for  rehab, medical management and nursing care.    HPI:    HPI information obtained from: facility chart records, facility staff, patient report, [facility] chart review and family/first contact son report.   Brief Summary of Hospital Course:   S/p fall 1/31/20 with L face trauma, c/o rib pain. Sent to [facility] ED.  CXR neg. Head CT showed ventriculomegaly out of proportion with with degree of Sulci atrophy. Results consistent with previous head imaging.  Did have Neuro consult 10/19. Did not think changes due to NPH. Thought to wang mixed dementia, vascular, possibly alzheimer's. At that time family did not want further Neuro appt.  Has had recurrent falls. No recent changes in gait. Has memory loss due to alzheimer's.  Does not always remember to use walker. Not not remember learned techniques for best transfers.  Per son has had ongoing loose stools in am.  Recent started on sched. Am imodium dose.           Updates on Status Since Skilled nursing Admission: no fall since readmission. neuros intact. No reports of pain    CODE STATUS/ADVANCE DIRECTIVES DISCUSSION:   DNR / DNI  Patient's living condition: lives in an assisted living facility  ALLERGIES: Penicillins  PAST MEDICAL HISTORY:  has a past medical history of Arthritis, Cataract, Cerebral infarction (H), CKD (chronic kidney  disease), Essential hypertension, benign, Falls frequently, Gastroesophageal reflux disease, Gout, Hemorrhoid, History of duodenal ulcer, History of DVT (deep vein thrombosis), History of GI bleed, Hyperlipidemia, Macular degeneration, Psoriasis, and Vitreous hemorrhage (H).  PAST SURGICAL HISTORY:   has a past surgical history that includes NONSPECIFIC PROCEDURE; NONSPECIFIC PROCEDURE; and CATARACT.  FAMILY HISTORY: family history includes Cerebrovascular Disease in her mother; Coronary Artery Disease in her father; Hypertension in her mother; Kidney Disease in her mother.  SOCIAL HISTORY:   reports that she has never smoked. She has never used smokeless tobacco. She reports that she does not drink alcohol or use drugs.    Post Discharge Medication Reconciliation Status: discharge medications reconciled and changed, per note/orders (see AVS)        ROS:  CONSTITUTIONAL:  forgetfulness, EYES:  glasses or contacts, ENT:  [place], CV:  lower extremity edema, RESPIRATORY: neg, GI:  diarrhea, NEURO:  neg, PSYCH: neg, MUSCULOSKELETAL: neg and SKIN: bruising    Vitals:  /61   Pulse 61   Temp 97.6  F (36.4  C)   Resp 18   SpO2 94%   Exam:  GENERAL APPEARANCE:  Alert, in no distress, cooperative  ENT:  Mouth and posterior oropharynx normal, moist mucous membranes, [place]  EYES:  EOM, conjunctivae, lids, pupils and irises normal, PERRL  NECK:  No adenopathy,masses or thyromegaly, FROM, no carotid bruit  RESP:  respiratory effort and palpation of chest normal, lungs clear to auscultation , no respiratory distress  CV:  Palpation and auscultation of heart done , regular rate and rhythm, no murmur, rub, or gallop, peripheral edema 1+ in LEs  ABDOMEN:  normal bowel sounds, soft, nontender, no hepatosplenomegaly or other masses, no guarding or rebound  M/S:   gait slowed, steady with walker. sl. shuffling gait.   SKIN:  bruising below L eye, superficial abrasion above L eye  NEURO:   Cranial nerves 2-12 are normal tested and  grossly at patient's baseline, alert, speech clear  PSYCH:  insight and judgement impaired, memory impaired , affect and mood normal    Lab/Diagnostic data:    Most Recent 3 CBC's:  Recent Labs   Lab Test 01/07/20   HGB 11.5*     Most Recent 3 BMP's:  Recent Labs   Lab Test 12/10/19      POTASSIUM 4.8   CHLORIDE 110*   CO2 21*   BUN 28   CR 1.31*   ANIONGAP 7   SLIME 9.0   GLC 74       ASSESSMENT/PLAN:  Recurrent falls  S/p fall 1/31/20. Facial bruising. No c/o rib pain today. Resp. Status stable  1. Son to maker Neurology appt with [facility]  2. Monitor for changes in Neuros  3. Refer to PT/OT  4. Cont. To remind to use walker at all times. Cont w.c. when out of apt    Late onset Alzheimer's disease without behavioral disturbance (H)  Memory loss. Slowed gait. No recent reports of increased depression  1. Cont. celexa  2. Neuro as above  3. OT for eval with ADLs  4. Monitor for changes in mood, behaviors    Diarrhea, unspecified type  Ongoing s/s  1. Increase sched. Imodium to bid  2. Cont. Prn imodium  3. Encourage fluids  4. Bmp in next 2 weeks         Electronically signed by:  JAYLAN Morales CNP         Documentation of Face-to-Face and Certification for Home Health Services     Patient: Judith M Trierweiler   YOB: 1940  MR Number: [id number]  Today's Date: 2/3/2020    I certify that patient: Judith M Trierweiler is under my care and that I, or a nurse practitioner or physician's assistant working with me, had a face-to-face encounter that meets the physician face-to-face encounter requirements with this patient on: 2/3/2020.    This encounter with the patient was in whole, or in part, for the following medical condition, which is the primary reason for home health care: LE weakness  .    I certify that, based on my findings, the following services are medically necessary home health services: Occupational Therapy and Physical Therapy.    My clinical  findings support the need for the above services because: Occupational Therapy Services are needed to assess and treat cognitive ability and address ADL safety due to impairment in memory. and Physical Therapy Services are needed to assess and treat the following functional impairments: LE weakness.    Further, I certify that my clinical findings support that this patient is homebound (i.e. absences from home require considerable and taxing effort and are for medical reasons or [employer] services or infrequently or of short duration when for other reasons) because: LE weakness, uses w.c. when out of apt.    Based on the above findings. I certify that this patient is confined to the home and needs intermittent skilled nursing care, physical therapy and/or speech therapy.  The patient is under my care, and I have initiated the establishment of the plan of care.  This patient will be followed by a physician who will periodically review the plan of care.  Physician/Provider to provide follow up care: Rylan Farris    Responsible Medicare certified PECOS Physician: Vale Lawrence  Physician Signature: See electronic signature associated with these discharge orders.  Date: 2/3/2020          Sincerely,        JAYLAN Morales CNP

## 2020-02-05 ENCOUNTER — PRE VISIT (OUTPATIENT)
Dept: UROLOGY | Facility: CLINIC | Age: 80
End: 2020-02-05

## 2020-02-05 ENCOUNTER — OFFICE VISIT (OUTPATIENT)
Dept: UROLOGY | Facility: CLINIC | Age: 80
End: 2020-02-05
Payer: COMMERCIAL

## 2020-02-05 VITALS
HEIGHT: 60 IN | BODY MASS INDEX: 26.9 KG/M2 | HEART RATE: 58 BPM | WEIGHT: 137 LBS | DIASTOLIC BLOOD PRESSURE: 53 MMHG | SYSTOLIC BLOOD PRESSURE: 127 MMHG

## 2020-02-05 DIAGNOSIS — N39.46 MIXED STRESS AND URGE URINARY INCONTINENCE: Primary | ICD-10-CM

## 2020-02-05 DIAGNOSIS — N95.2 ATROPHIC VAGINITIS: ICD-10-CM

## 2020-02-05 DIAGNOSIS — N39.0 RECURRENT UTI: ICD-10-CM

## 2020-02-05 LAB
ALBUMIN UR-MCNC: ABNORMAL MG/DL
APPEARANCE UR: CLEAR
BILIRUB UR QL STRIP: NEGATIVE
COLOR UR AUTO: YELLOW
GLUCOSE UR STRIP-MCNC: NEGATIVE MG/DL
HGB UR QL STRIP: NEGATIVE
KETONES UR STRIP-MCNC: NEGATIVE MG/DL
LEUKOCYTE ESTERASE UR QL STRIP: ABNORMAL
NITRATE UR QL: NEGATIVE
PH UR STRIP: 5.5 PH (ref 5–7)
SP GR UR STRIP: 1.02 (ref 1–1.03)
UROBILINOGEN UR STRIP-ACNC: 0.2 EU/DL (ref 0.2–1)

## 2020-02-05 PROCEDURE — 81003 URINALYSIS AUTO W/O SCOPE: CPT

## 2020-02-05 ASSESSMENT — PATIENT HEALTH QUESTIONNAIRE - PHQ9
10. IF YOU CHECKED OFF ANY PROBLEMS, HOW DIFFICULT HAVE THESE PROBLEMS MADE IT FOR YOU TO DO YOUR WORK, TAKE CARE OF THINGS AT HOME, OR GET ALONG WITH OTHER PEOPLE: VERY DIFFICULT
SUM OF ALL RESPONSES TO PHQ QUESTIONS 1-9: 10
SUM OF ALL RESPONSES TO PHQ QUESTIONS 1-9: 10

## 2020-02-05 ASSESSMENT — MIFFLIN-ST. JEOR: SCORE: 1001.58

## 2020-02-05 ASSESSMENT — PAIN SCALES - GENERAL: PAINLEVEL: NO PAIN (0)

## 2020-02-05 NOTE — LETTER
2020     RE: Judith M Trierweiler [facility]  [address]  [facility] [address]     Dear Colleague,    Thank you for referring your patient, Judith M Trierweiler, to the [facility] UROLOGY AND INST FOR PROSTATE AND UROLOGIC CANCERS at [facility]. Please see a copy of my visit note below.    CC:  Recurrent uti's.     HPI:  Judith M Trierweiler is a 79 year old female asked to be seen in consultation by Dr. Matthews for the above.  This problem has been going on for many years.  She feels like she gets 5-6 infections per year.  Her symptoms are increased incontinence and some burning but no blood, she also has some increase in her dementia. She has been taking a low dose antibiotic but this was recently changed to nitrofurantoin.  The patient voids qfew hours, nocturia X 2-3.  She drinks mainly water and some coffee.  She does not drink much water..  She denies any dysuria,  hematuria, hesitancy, intermittency, feeling of incomplete emptying, or any recent hx of stones.    The patient has no constipation or splinting.  She is not sexually active and denies any dyspareunia or pelvic pain.   She denies any vaginal bulge.  She has some neurological problems with some hydrocephalus.     Obstetric Hx:  She is .  The weight of her largest baby was 9 lbs 5oz.  Babies were delivered vaginally.  Menopause around age 48, HRT:  none    Past Medical History:   Diagnosis Date     Arthritis      Cataract      Cerebral infarction (H)      CKD (chronic kidney disease)      Essential hypertension, benign      Falls frequently      Gastroesophageal reflux disease      Gout      Hemorrhoid      History of duodenal ulcer      History of DVT (deep vein thrombosis)      History of GI bleed      Hyperlipidemia      Macular degeneration      Psoriasis      Vitreous hemorrhage (H)        Past Surgical History:   Procedure Laterality Date     C NONSPECIFIC PROCEDURE      Hysterectomy  "\"pre-cancer\"     C NONSPECIFIC PROCEDURE      cholecystectomy     CATARACT         Meds, Allergies, FHx and SHx reviewed per nurse's intake note.    ROS is negative on a 14 point scale except for balance issues.  All other positive and pertinent information is mentioned in the HPI.    PEx:   Blood pressure 127/53, pulse 58, height 1.514 m (4' 11.6\"), weight 62.1 kg (137 lb).  4' 11.6\", Body mass index is 27.12 kg/m ., 137 lbs 0 oz  Gen appearance:  Well groomed  HEENT:  EOMI, AT NC  Psych:  Normal Affect  Neuro:  A/O X 3  Skin:  Warm to touch  Resp:  No increased respiratory effort  Vasc:  RRR  lymph:  1+ LE edema  Musk:  Full ROM in extremities  :  deferred      UA: UA RESULTS:  Recent Labs   Lab Test 02/05/20  1512   COLOR Yellow   APPEARANCE Clear   URINEGLC Negative   URINEBILI Negative   URINEKETONE Negative   SG 1.020   UBLD Negative   URINEPH 5.5   PROTEIN Trace*   UROBILINOGEN 0.2   NITRITE Negative   LEUKEST Small*         ASSESSMENT and PLAN:  This is a 79 year old female with recurrent uti's and urinary incontinence in the setting of hydrocephalus and some dementia.  Different management options were discussed with the patient including observation, medication and further w/u.  -plan to do perform UDS   -f/u to review on the same day and undergo cystoscopy and exam of prolapse  -estrogen cream.    Thank you for allowing me to participate in Ms. Trierweiler's care.  I will keep you updated on her progress.    Jose Carvalho MD    Answers for HPI/ROS submitted by the patient on 2/5/2020   If you checked off any problems, how difficult have these problems made it for you to do your work, take care of things at home, or get along with other people?: Very difficult  PHQ9 TOTAL SCORE: 10    "

## 2020-02-05 NOTE — PATIENT INSTRUCTIONS
Please schedule next available Urodynamics and a cystoscopy with Dr. Carvalho following Urodynamics.        It was a pleasure meeting with you today.  Thank you for allowing me and my team the privilege of caring for you today.  YOU are the reason we are here, and I truly hope we provided you with the excellent service you deserve.  Please let us know if there is anything else we can do for you so that we can be sure you are leaving completely satisfied with your care experience.

## 2020-02-05 NOTE — PROGRESS NOTES
"CC:  Recurrent uti's.     HPI:  Judith M Trierweiler is a 79 year old female asked to be seen in consultation by Dr. Matthews for the above.  This problem has been going on for many years.  She feels like she gets 5-6 infections per year.  Her symptoms are increased incontinence and some burning but no blood, she also has some increase in her dementia. She has been taking a low dose antibiotic but this was recently changed to nitrofurantoin.  The patient voids qfew hours, nocturia X 2-3.  She drinks mainly water and some coffee.  She does not drink much water..  She denies any dysuria,  hematuria, hesitancy, intermittency, feeling of incomplete emptying, or any recent hx of stones.    The patient has no constipation or splinting.  She is not sexually active and denies any dyspareunia or pelvic pain.   She denies any vaginal bulge.  She has some neurological problems with some hydrocephalus.     Obstetric Hx:  She is .  The weight of her largest baby was 9 lbs 5oz.  Babies were delivered vaginally.  Menopause around age 48, HRT:  none    Past Medical History:   Diagnosis Date     Arthritis      Cataract      Cerebral infarction (H)      CKD (chronic kidney disease)      Essential hypertension, benign      Falls frequently      Gastroesophageal reflux disease      Gout      Hemorrhoid      History of duodenal ulcer      History of DVT (deep vein thrombosis)      History of GI bleed      Hyperlipidemia      Macular degeneration      Psoriasis      Vitreous hemorrhage (H)        Past Surgical History:   Procedure Laterality Date     C NONSPECIFIC PROCEDURE      Hysterectomy \"pre-cancer\"     C NONSPECIFIC PROCEDURE      cholecystectomy     CATARACT         Meds, Allergies, FHx and SHx reviewed per nurse's intake note.    ROS is negative on a 14 point scale except for balance issues.  All other positive and pertinent information is mentioned in the HPI.    PEx:   Blood pressure 127/53, pulse 58, height 1.514 m (4' " "11.6\"), weight 62.1 kg (137 lb).  4' 11.6\", Body mass index is 27.12 kg/m ., 137 lbs 0 oz  Gen appearance:  Well groomed  HEENT:  EOMI, AT NC  Psych:  Normal Affect  Neuro:  A/O X 3  Skin:  Warm to touch  Resp:  No increased respiratory effort  Vasc:  RRR  lymph:  1+ LE edema  Musk:  Full ROM in extremities  :  deferred      UA: UA RESULTS:  Recent Labs   Lab Test 02/05/20  1512   COLOR Yellow   APPEARANCE Clear   URINEGLC Negative   URINEBILI Negative   URINEKETONE Negative   SG 1.020   UBLD Negative   URINEPH 5.5   PROTEIN Trace*   UROBILINOGEN 0.2   NITRITE Negative   LEUKEST Small*         ASSESSMENT and PLAN:  This is a 79 year old female with recurrent uti's and urinary incontinence in the setting of hydrocephalus and some dementia.  Different management options were discussed with the patient including observation, medication and further w/u.  -plan to do perform UDS   -f/u to review on the same day and undergo cystoscopy and exam of prolapse  -estrogen cream.    Thank you for allowing me to participate in Ms. Trierweiler's care.  I will keep you updated on her progress.    Jose Carvalho MD  Answers for HPI/ROS submitted by the patient on 2/5/2020   If you checked off any problems, how difficult have these problems made it for you to do your work, take care of things at home, or get along with other people?: Very difficult  PHQ9 TOTAL SCORE: 10    "

## 2020-02-06 ENCOUNTER — TELEPHONE (OUTPATIENT)
Dept: UROLOGY | Facility: CLINIC | Age: 80
End: 2020-02-06

## 2020-02-06 NOTE — TELEPHONE ENCOUNTER
[facility] Call Center    Phone Message    May a detailed message be left on voicemail: no     Reason for Call: Other: Pharmacy is requesting a verbal order for COMPOUNDED NON-CONTROLLED SUBSTANCE (CMPD RX) - PHARMACY TO MIX COMPOUNDED MEDICATION, please call back.      Action Taken: Message routed to:  [facility] ([facility] Urology    Travel Screening: Not Applicable

## 2020-02-06 NOTE — TELEPHONE ENCOUNTER
Called pharmacy and gave the script verbally to pharmacy for her estriol Cristina Carvajal, LPN Staff Nurse

## 2020-02-07 ENCOUNTER — TELEPHONE (OUTPATIENT)
Dept: UROLOGY | Facility: CLINIC | Age: 80
End: 2020-02-07

## 2020-02-07 ENCOUNTER — TELEPHONE (OUTPATIENT)
Dept: GERIATRICS | Facility: CLINIC | Age: 80
End: 2020-02-07

## 2020-02-07 NOTE — TELEPHONE ENCOUNTER
[facility] Call Center    Phone Message    May a detailed message be left on voicemail: no     Reason for Call: Other: Urmila at pt's care center requesting call back. She stated the pt came home with paperwork from her appt to do a bladder diary but the pt has dementia. Urmila wondering if there are other options for someone with dementia     Action Taken: Message routed to:  [facility] uro    Travel Screening: Not Applicable

## 2020-02-07 NOTE — TELEPHONE ENCOUNTER
Central Prior Authorization Team   Phone: [phone]      PA Initiation    Medication: nitrofurantin 100mg capsules - INITIATED  Insurance Company: [employer] - Phone [phone] Fax [phone]  Pharmacy Filling the Rx: [facility] - [address]  Filling Pharmacy Phone: [phone]  Filling Pharmacy Fax: [phone]  Start Date: 2/7/2020    PA initiated via phone with [employer] representative.    Reference # [id number]

## 2020-02-07 NOTE — TELEPHONE ENCOUNTER
Prior Authorization Retail Medication Request    Medication/Dose: nitrofurantin 100mg capsules  ICD code (if different than what is on RX):   Previously Tried and Failed:   Rationale:      Insurance Name:  [employer]  Insurance ID:  [id number]  Insurance Ph#: [phone]      Pharmacy Information (if different than what is on RX)  Name:  [facility]  Phone:  [phone]  Fax: [phone]

## 2020-02-10 NOTE — TELEPHONE ENCOUNTER
Central Prior Authorization Team   Phone: [phone]      Prior Authorization Approval    Authorization Effective Date: 2/7/2020  Authorization Expiration Date: 12/31/2020  Medication: nitrofurantoin 100mg capsules - APPROVED  Approved Dose/Quantity: 90 FOR 90  Reference #: [id number]   Insurance Company: [employer] - Phone [phone] Fax [phone]  Expected CoPay:       CoPay Card Available:      Foundation Assistance Needed:    Which Pharmacy is filling the prescription (Not needed for infusion/clinic administered): [facility] - [address]  Pharmacy Notified: Yes  Patient Notified: Yes (**Instructed pharmacy to notify patient when script is ready to /ship.**)

## 2020-02-10 NOTE — TELEPHONE ENCOUNTER
Called patients care giver and told them she does not need to do voiding diary  Patient unable to do Cristina Carvajal LPN Staff Nurse

## 2020-02-11 ENCOUNTER — RECORDS - HEALTHEAST (OUTPATIENT)
Dept: LAB | Facility: CLINIC | Age: 80
End: 2020-02-11

## 2020-02-11 ENCOUNTER — TRANSFERRED RECORDS (OUTPATIENT)
Dept: HEALTH INFORMATION MANAGEMENT | Facility: CLINIC | Age: 80
End: 2020-02-11

## 2020-02-11 LAB
ANION GAP SERPL CALCULATED.3IONS-SCNC: 6 MMOL/L (ref 5–18)
ANION GAP SERPL CALCULATED.3IONS-SCNC: 6 MMOL/L (ref 5–18)
BUN SERPL-MCNC: 36 MG/DL (ref 8–28)
BUN SERPL-MCNC: 36 MG/DL (ref 8–28)
CALCIUM SERPL-MCNC: 9.5 MG/DL (ref 8.5–10.5)
CALCIUM SERPL-MCNC: 9.5 MG/DL (ref 8.5–10.5)
CHLORIDE BLD-SCNC: 109 MMOL/L (ref 98–107)
CHLORIDE SERPLBLD-SCNC: 109 MMOL/L (ref 98–107)
CO2 SERPL-SCNC: 26 MMOL/L (ref 22–31)
CO2 SERPL-SCNC: 26 MMOL/L (ref 22–31)
CREAT SERPL-MCNC: 1.33 MG/DL (ref 0.6–1.1)
CREAT SERPL-MCNC: 1.33 MG/DL (ref 0.6–1.1)
GFR SERPL CREATININE-BSD FRML MDRD: 38 ML/MIN/1.73M2
GFR SERPL CREATININE-BSD FRML MDRD: 38 ML/MIN/1.73M2
GLUCOSE BLD-MCNC: 82 MG/DL (ref 70–125)
GLUCOSE SERPL-MCNC: 82 MG/DL (ref 70–125)
POTASSIUM BLD-SCNC: 5.1 MMOL/L (ref 3.5–5)
POTASSIUM SERPL-SCNC: 5.1 MMOL/L (ref 3.5–5)
SODIUM SERPL-SCNC: 141 MMOL/L (ref 136–145)
SODIUM SERPL-SCNC: 141 MMOL/L (ref 136–145)

## 2020-02-14 ENCOUNTER — TRANSFERRED RECORDS (OUTPATIENT)
Dept: HEALTH INFORMATION MANAGEMENT | Facility: CLINIC | Age: 80
End: 2020-02-14

## 2020-02-14 ENCOUNTER — RECORDS - HEALTHEAST (OUTPATIENT)
Dept: LAB | Facility: CLINIC | Age: 80
End: 2020-02-14

## 2020-02-14 LAB
ALBUMIN UR-MCNC: ABNORMAL MG/DL
APPEARANCE UR: CLEAR
BACTERIA #/AREA URNS HPF: ABNORMAL HPF
BILIRUB UR QL STRIP: NEGATIVE
COLOR UR AUTO: YELLOW
GLUCOSE UR STRIP-MCNC: NEGATIVE MG/DL
HGB UR QL STRIP: NEGATIVE
KETONES UR STRIP-MCNC: NEGATIVE MG/DL
LEUKOCYTE ESTERASE UR QL STRIP: ABNORMAL
NITRATE UR QL: NEGATIVE
PH UR STRIP: 5 [PH] (ref 4.5–8)
RBC #/AREA URNS AUTO: ABNORMAL HPF
SP GR UR STRIP: 1.01 (ref 1–1.03)
SQUAMOUS #/AREA URNS AUTO: ABNORMAL LPF
TRANS CELLS #/AREA URNS HPF: ABNORMAL LPF
UROBILINOGEN UR STRIP-ACNC: ABNORMAL
WBC #/AREA URNS AUTO: ABNORMAL HPF
WBC CLUMPS #/AREA URNS HPF: PRESENT /[HPF]

## 2020-02-16 ENCOUNTER — HEALTH MAINTENANCE LETTER (OUTPATIENT)
Age: 80
End: 2020-02-16

## 2020-02-17 ENCOUNTER — TRANSFERRED RECORDS (OUTPATIENT)
Dept: HEALTH INFORMATION MANAGEMENT | Facility: CLINIC | Age: 80
End: 2020-02-17

## 2020-02-17 ENCOUNTER — RECORDS - HEALTHEAST (OUTPATIENT)
Dept: LAB | Facility: CLINIC | Age: 80
End: 2020-02-17

## 2020-02-17 LAB
ANION GAP SERPL CALCULATED.3IONS-SCNC: 9 MMOL/L (ref 5–18)
ANION GAP SERPL CALCULATED.3IONS-SCNC: 9 MMOL/L (ref 5–18)
BACTERIA SPEC CULT: ABNORMAL
BACTERIA SPEC CULT: ABNORMAL
BASOPHILS # BLD AUTO: 0 THOU/UL (ref 0–0.2)
BASOPHILS NFR BLD AUTO: 1 % (ref 0–2)
BUN SERPL-MCNC: 37 MG/DL (ref 8–28)
BUN SERPL-MCNC: 37 MG/DL (ref 8–28)
CALCIUM SERPL-MCNC: 9.2 MG/DL (ref 8.5–10.5)
CALCIUM SERPL-MCNC: 9.2 MG/DL (ref 8.5–10.5)
CHLORIDE BLD-SCNC: 110 MMOL/L (ref 98–107)
CHLORIDE SERPLBLD-SCNC: 110 MMOL/L (ref 98–107)
CO2 SERPL-SCNC: 25 MMOL/L (ref 22–31)
CO2 SERPL-SCNC: 25 MMOL/L (ref 22–31)
CREAT SERPL-MCNC: 1.47 MG/DL (ref 0.6–1.1)
CREAT SERPL-MCNC: 1.47 MG/DL (ref 0.6–1.1)
DIFFERENTIAL: ABNORMAL
EOSINOPHIL # BLD AUTO: 0.3 THOU/UL (ref 0–0.4)
EOSINOPHIL NFR BLD AUTO: 4 % (ref 0–6)
ERYTHROCYTE [DISTWIDTH] IN BLOOD BY AUTOMATED COUNT: 12.6 % (ref 11–14.5)
ERYTHROCYTE [DISTWIDTH] IN BLOOD BY AUTOMATED COUNT: 12.6 % (ref 11–14.5)
GFR SERPL CREATININE-BSD FRML MDRD: 34 ML/MIN/1.73M2
GFR SERPL CREATININE-BSD FRML MDRD: 34 ML/MIN/1.73M2
GLUCOSE BLD-MCNC: 87 MG/DL (ref 70–125)
GLUCOSE SERPL-MCNC: 87 MG/DL (ref 70–125)
HCT VFR BLD AUTO: 35 % (ref 35–47)
HCT VFR BLD AUTO: 35.2 % (ref 35–47)
HEMOGLOBIN: 11.2 G/DL (ref 12–16)
HGB BLD-MCNC: 11.2 G/DL (ref 12–16)
LYMPHOCYTES # BLD AUTO: 1.7 THOU/UL (ref 0.8–4.4)
LYMPHOCYTES NFR BLD AUTO: 21 % (ref 20–40)
MCH RBC QN AUTO: 31.3 PG (ref 27–34)
MCH RBC QN AUTO: 31.3 PG (ref 27–34)
MCHC RBC AUTO-ENTMCNC: 31.8 G/DL (ref 32–36)
MCHC RBC AUTO-ENTMCNC: 31.8 G/DL (ref 32–36)
MCV RBC AUTO: 98 FL (ref 80–100)
MCV RBC AUTO: 98 FL (ref 80–100)
MONOCYTES # BLD AUTO: 0.8 THOU/UL (ref 0–0.9)
MONOCYTES NFR BLD AUTO: 10 % (ref 2–10)
NEUTROPHILS # BLD AUTO: 5 THOU/UL (ref 2–7.7)
NEUTROPHILS NFR BLD AUTO: 64 % (ref 50–70)
PLATELET # BLD AUTO: 250 THOU/UL (ref 140–440)
PLATELET # BLD AUTO: 250 THOU/UL (ref 140–440)
PMV BLD AUTO: 10.9 FL (ref 8.5–12.5)
POTASSIUM BLD-SCNC: 4.9 MMOL/L (ref 3.5–5)
POTASSIUM SERPL-SCNC: 4.9 MMOL/L (ref 3.5–5)
RBC # BLD AUTO: 3.58 MILL/UL (ref 3.8–5.4)
RBC # BLD AUTO: 3.58 MILL/UL (ref 3.8–5.4)
SODIUM SERPL-SCNC: 144 MMOL/L (ref 136–145)
SODIUM SERPL-SCNC: 144 MMOL/L (ref 136–145)
WBC # BLD AUTO: 7.8 THOU/UL (ref 4–11)
WBC: 7.8 THOU/UL (ref 4–11)

## 2020-02-28 ENCOUNTER — PRE VISIT (OUTPATIENT)
Dept: UROLOGY | Facility: CLINIC | Age: 80
End: 2020-02-28

## 2020-03-03 ENCOUNTER — RECORDS - HEALTHEAST (OUTPATIENT)
Dept: LAB | Facility: CLINIC | Age: 80
End: 2020-03-03

## 2020-03-03 ENCOUNTER — TRANSFERRED RECORDS (OUTPATIENT)
Dept: HEALTH INFORMATION MANAGEMENT | Facility: CLINIC | Age: 80
End: 2020-03-03

## 2020-03-03 LAB
ANION GAP SERPL CALCULATED.3IONS-SCNC: 7 MMOL/L (ref 5–18)
ANION GAP SERPL CALCULATED.3IONS-SCNC: 7 MMOL/L (ref 5–18)
BUN SERPL-MCNC: 35 MG/DL (ref 8–28)
BUN SERPL-MCNC: 35 MG/DL (ref 8–28)
CALCIUM SERPL-MCNC: 9.1 MG/DL (ref 8.5–10.5)
CALCIUM SERPL-MCNC: 9.1 MG/DL (ref 8.5–10.5)
CHLORIDE BLD-SCNC: 105 MMOL/L (ref 98–107)
CHLORIDE SERPLBLD-SCNC: 105 MMOL/L (ref 98–107)
CO2 SERPL-SCNC: 24 MMOL/L (ref 22–31)
CO2 SERPL-SCNC: 24 MMOL/L (ref 22–31)
CREAT SERPL-MCNC: 1.2 MG/DL (ref 0.6–1.1)
CREAT SERPL-MCNC: 1.2 MG/DL (ref 0.6–1.1)
GFR SERPL CREATININE-BSD FRML MDRD: 43 ML/MIN/1.73M2
GFR SERPL CREATININE-BSD FRML MDRD: 43 ML/MIN/1.73M2
GLUCOSE BLD-MCNC: 67 MG/DL (ref 70–125)
GLUCOSE SERPL-MCNC: 67 MG/DL (ref 70–125)
POTASSIUM BLD-SCNC: 4.4 MMOL/L (ref 3.5–5)
POTASSIUM SERPL-SCNC: 4.4 MMOL/L (ref 3.5–5)
SODIUM SERPL-SCNC: 136 MMOL/L (ref 136–145)
SODIUM SERPL-SCNC: 136 MMOL/L (ref 136–145)

## 2020-03-10 ENCOUNTER — ANCILLARY PROCEDURE (OUTPATIENT)
Dept: RADIOLOGY | Facility: AMBULATORY SURGERY CENTER | Age: 80
End: 2020-03-10
Attending: NURSE PRACTITIONER
Payer: COMMERCIAL

## 2020-03-10 ENCOUNTER — OFFICE VISIT (OUTPATIENT)
Dept: UROLOGY | Facility: CLINIC | Age: 80
End: 2020-03-10
Payer: COMMERCIAL

## 2020-03-10 VITALS
HEART RATE: 60 BPM | SYSTOLIC BLOOD PRESSURE: 123 MMHG | WEIGHT: 137 LBS | BODY MASS INDEX: 27.62 KG/M2 | HEIGHT: 59 IN | DIASTOLIC BLOOD PRESSURE: 66 MMHG

## 2020-03-10 DIAGNOSIS — N32.81 OVERACTIVE BLADDER: ICD-10-CM

## 2020-03-10 DIAGNOSIS — N39.0 RECURRENT UTI: ICD-10-CM

## 2020-03-10 DIAGNOSIS — N39.46 MIXED STRESS AND URGE URINARY INCONTINENCE: ICD-10-CM

## 2020-03-10 DIAGNOSIS — N39.46 MIXED STRESS AND URGE URINARY INCONTINENCE: Primary | ICD-10-CM

## 2020-03-10 LAB
ALBUMIN UR-MCNC: ABNORMAL MG/DL
APPEARANCE UR: CLEAR
BILIRUB UR QL STRIP: NEGATIVE
COLOR UR AUTO: YELLOW
GLUCOSE UR STRIP-MCNC: NEGATIVE MG/DL
HGB UR QL STRIP: ABNORMAL
KETONES UR STRIP-MCNC: NEGATIVE MG/DL
LEUKOCYTE ESTERASE UR QL STRIP: NEGATIVE
NITRATE UR QL: NEGATIVE
PH UR STRIP: 5 PH (ref 5–7)
SP GR UR STRIP: 1.01 (ref 1–1.03)
UROBILINOGEN UR STRIP-ACNC: 0.2 EU/DL (ref 0.2–1)

## 2020-03-10 PROCEDURE — 81003 URINALYSIS AUTO W/O SCOPE: CPT

## 2020-03-10 RX ORDER — SUCRALFATE 1 G/1
TABLET ORAL
COMMUNITY
Start: 2019-04-15 | End: 2020-12-15

## 2020-03-10 RX ORDER — MIRABEGRON 50 MG/1
50 TABLET, EXTENDED RELEASE ORAL DAILY
Qty: 30 TABLET | Refills: 6 | Status: SHIPPED | OUTPATIENT
Start: 2020-03-10 | End: 2020-04-09

## 2020-03-10 ASSESSMENT — MIFFLIN-ST. JEOR: SCORE: 992.06

## 2020-03-10 ASSESSMENT — PAIN SCALES - GENERAL: PAINLEVEL: NO PAIN (0)

## 2020-03-10 NOTE — PROGRESS NOTES
PREPROCEDURE DIAGNOSES:    1. Recurrent UTIs  2. Urinary incontinence in setting of hydrocephalus and some dementia.     POSTPROCEDURE DIAGNOSES:  -Normal bladder capacity (filling ceased at 318 mL) with appropriate filling sensations.  -Multiple uninhibited detrusor contractions, beginning at fill volume of 128 mL. This pattern continues x5, with peak Pdet reaching 56 cm H20. She does leak during two of these UDCs.   -Good bladder compliance between episodes of DO/DOI.  -No reproducible JAMI.  -Very weak detrusor contraction during voiding attempt; however, the Pves catheter then becomes dislodged. She proceeds to then attempt to empty her bladder through Valsalva effot.  -Normal flow rate (Qmax 22 mL/s) with an intermittent flow curve and incomplete bladder emptying (final  mL).  -Increased EMG activity during voiding, which possibly correlates with patient's Valsalva effort vs. contamination of her EMG patches.   -BOOI does not suggest bladder outlet obstruction.  -Fluoroscopy reveals a mildly-trabeculated bladder wall without diverticulae or cellules.  No vesicoureteral reflux was observed.  The bladder neck was closed during filling and open during periods of incontinence.     PROCEDURE:    1. Sterile urethral catheterization for measurement of postvoid residual urine volume.  2. Complex filling cystometrogram with measurement of bladder and rectal pressures.  3. Complex voiding cystometrogram with measurement of bladder and rectal pressures.  4. Electromyography of the pelvic floor during urodynamics.  5. Fluoroscopic imaging of the bladder during urodynamics, at least 3 views.    6. Interpretation of urodynamics and flouroscopic imaging.      INDICATIONS FOR PROCEDURE:  Ms. Judith M Trierweiler is a pleasant 79 year old female with recurrent UTIs and urinary incontinence in setting of hydrocephalus and some dementia. Baseline video urodynamic assessment is requested today by Dr. Carvalho to better  characterize Ms. Judith M Trierweiler's voiding dysfunction.      VOIDING DIARY:  Not completed.    DESCRIPTION OF PROCEDURE:  Risks, benefits, and alternatives to urodynamics were discussed with the patient and she wished to proceed.  Urodynamics are planned to better assess the primary etiology for Ms. Trierweiler's urologic dysfunction.  The patient does not currently take anticholinergic medication.  After informed consent was obtained, the patient was taken to the procedure room where the study. Findings below.     PRE-STUDY UROFLOWMETRY:  Postvoid residual by catheter: 70 mL.  Pretest urine dipstick was negative for leukocytes and nitrites.    Next a 7F double-lumen urodynamics catheter was inserted into the bladder under sterile technique via the urethra.  A 7F abdominal manometry catheter was placed in the rectum.  EMG pads were placed on both sides of the anal verge.  The bladder was filled with 200 mL of Omnipaque at 30 mL/minute and serial pressures were recorded.  With coughing there was an appropriate rise in vesical and abdominal pressures with no change in detrusor pressure, confirming good study catheter placement.    DURING THE FILLING PHASE:  First sensation: 128 mL.  Maximum Capacity: Filling ceased at 318 mL due to frequent episodes of DO.    Uninhibited detrusor contractions: Multiple, beginning at fill volume of 128 mL. This pattern continues x5, with peak Pdet reaching 56 cm H20. She does leak during two of these UDCs.   Compliance: Good, between episodes of DO. PDet=Essentially 0 cmH20 at capacity.   Continence: DOI, as described above. No reproducible JAMI.   EMG: Increased prior to later episodes of DO.    DURING THE VOIDING PHASE:  Patient given permission to void during her last episode of DO, but was unable to void any urine while seated on the [employer] chair. She was then assisted to a commFinancial Guard-style chair, which yielded the following data:   Maximum detrusor contraction with void: Once  transferred to the [facility], she marco antonio a very weak detrusor contraction, but her Pves catheter then appears to dislodge as she voids. She was unable to empty completely initially, and then appears to void per Valsalva effort.   Voided volume: 202 mL.  Maximum flow rate: 22 mL/sec.  Average flow rate: 5.9 mL/sec.  Postvoid Residual: 108 mL.  EMG activity: Increased, which appears to coincide with Valsalva effort, but may also reflect EMG patch contamination.  Character of voiding curve: Intermittent.  BOOI: -38.1 (suggesting no obstruction - see key below)  [obstructed (PAPPAS index [BOOI] ? 40); equivocal (no definite   obstruction; BOOI 20-40); and no obstruction (BOOI ? 20)]    FLUOROSCOPIC IMAGING OF THE BLADDER DURING URODYNAMICS:  Please note, image numbers on UDS tracings correlate with iSite series numbers on PACS images. Fluoroscopy during today's procedure demonstrated a mildly-trabeculated bladder wall without diverticulae or cellules.  No vesicoureteral reflux was observed.  The bladder neck was closed during filling and open during periods of incontinence. After voiding to completion, all catheters were removed and the patient was brought back into the consultation room to further discuss today's study results.      ASSESSMENT/PLAN:  Ms. Judith M Trierweiler is a pleasant 79 year old female with recurrent UTIs and urinary incontinence in setting of hydrocephalus and some dementia who demonstrated the following findings today on urodynamic evaluation:    -Normal bladder capacity (filling ceased at 318 mL) with appropriate filling sensations.  -Multiple uninhibited detrusor contractions, beginning at fill volume of 128 mL. This pattern continues x5, with peak Pdet reaching 56 cm H20. She does leak during two of these UDCs.   -Good bladder compliance between episodes of DO/DOI.  -No reproducible JAMI.  -Very weak detrusor contraction during voiding attempt; however, the Pves catheter then becomes dislodged. She  proceeds to then attempt to empty her bladder through Valsalva effot.  -Normal flow rate (Qmax 22 mL/s) with an intermittent flow curve and incomplete bladder emptying (final  mL).  -Increased EMG activity during voiding, which possibly correlates with patient's Valsalva effort vs. contamination of her EMG patches.   -BOOI does not suggest bladder outlet obstruction.  -Fluoroscopy reveals a mildly-trabeculated bladder wall without diverticulae or cellules.  No vesicoureteral reflux was observed.  The bladder neck was closed during filling and open during periods of incontinence.   -Study findings were reviewed in detail with the patient and her daughter. Given frequent DO, will initiate mirabegron 50 mg daily (will avoid anticholinergics due to patient's history of dementia). Rx sent to her pharmacy.   -The patient will plan to follow up as scheduled with Dr. Carvalho in two months to discuss symptoms on mirabegron.     -As the patient is currently on daily Macrobid, no additional UTI prophylaxis was provided today.     Thank you for allowing me to participate in the care of Ms. Judith M Trierweiler and please don't hesitate to contact me with any questions or concerns.      This procedure was performed under a collaborative agreement with Dr. José Miguel Melton, Professor and  of Urology, [facility].    JAYLAN Le, CNP  Department of Urology

## 2020-03-10 NOTE — LETTER
3/10/2020       RE: Judith M Trierweiler [facility]  [address]  [facility] [address]     Dear Colleague,    Thank you for referring your patient, Judith M Trierweiler, to the [facility] PROST[facility] AND UROLOGIC CANCERS at [facility]. Please see a copy of my visit note below.    PREPROCEDURE DIAGNOSES:    1. Recurrent UTIs  2. Urinary incontinence in setting of hydrocephalus and some dementia.     POSTPROCEDURE DIAGNOSES:  -Normal bladder capacity (filling ceased at 318 mL) with appropriate filling sensations.  -Multiple uninhibited detrusor contractions, beginning at fill volume of 128 mL. This pattern continues x5, with peak Pdet reaching 56 cm H20. She does leak during two of these UDCs.   -Good bladder compliance between episodes of DO/DOI.  -No reproducible JAMI.  -Very weak detrusor contraction during voiding attempt; however, the Pves catheter then becomes dislodged. She proceeds to then attempt to empty her bladder through Valsalva effot.  -Normal flow rate (Qmax 22 mL/s) with an intermittent flow curve and incomplete bladder emptying (final  mL).  -Increased EMG activity during voiding, which possibly correlates with patient's Valsalva effort vs. contamination of her EMG patches.   -BOOI does not suggest bladder outlet obstruction.  -Fluoroscopy reveals a mildly-trabeculated bladder wall without diverticulae or cellules.  No vesicoureteral reflux was observed.  The bladder neck was closed during filling and open during periods of incontinence.     PROCEDURE:    1. Sterile urethral catheterization for measurement of postvoid residual urine volume.  2. Complex filling cystometrogram with measurement of bladder and rectal pressures.  3. Complex voiding cystometrogram with measurement of bladder and rectal pressures.  4. Electromyography of the pelvic floor during urodynamics.  5. Fluoroscopic imaging of the bladder during  urodynamics, at least 3 views.    6. Interpretation of urodynamics and flouroscopic imaging.      INDICATIONS FOR PROCEDURE:  Ms. Judith M Trierweiler is a pleasant 79 year old female with recurrent UTIs and urinary incontinence in setting of hydrocephalus and some dementia. Baseline video urodynamic assessment is requested today by Dr. Carvalho to better characterize Ms. Judith M Trierweiler's voiding dysfunction.      VOIDING DIARY:  Not completed.    DESCRIPTION OF PROCEDURE:  Risks, benefits, and alternatives to urodynamics were discussed with the patient and she wished to proceed.  Urodynamics are planned to better assess the primary etiology for Ms. Trierweiler's urologic dysfunction.  The patient does not currently take anticholinergic medication.  After informed consent was obtained, the patient was taken to the procedure room where the study. Findings below.     PRE-STUDY UROFLOWMETRY:  Postvoid residual by catheter: 70 mL.  Pretest urine dipstick was negative for leukocytes and nitrites.    Next a 7F double-lumen urodynamics catheter was inserted into the bladder under sterile technique via the urethra.  A 7F abdominal manometry catheter was placed in the rectum.  EMG pads were placed on both sides of the anal verge.  The bladder was filled with 200 mL of Omnipaque at 30 mL/minute and serial pressures were recorded.  With coughing there was an appropriate rise in vesical and abdominal pressures with no change in detrusor pressure, confirming good study catheter placement.    DURING THE FILLING PHASE:  First sensation: 128 mL.  Maximum Capacity: Filling ceased at 318 mL due to frequent episodes of DO.    Uninhibited detrusor contractions: Multiple, beginning at fill volume of 128 mL. This pattern continues x5, with peak Pdet reaching 56 cm H20. She does leak during two of these UDCs.   Compliance: Good, between episodes of DO. PDet=Essentially 0 cmH20 at capacity.   Continence: DOI, as described above. No  reproducible JAMI.   EMG: Increased prior to later episodes of DO.    DURING THE VOIDING PHASE:  Patient given permission to void during her last episode of DO, but was unable to void any urine while seated on the Impraisea chair. She was then assisted to a [facility]-style chair, which yielded the following data:   Maximum detrusor contraction with void: Once transferred to the commode, she marco antonio a very weak detrusor contraction, but her Pves catheter then appears to dislodge as she voids. She was unable to empty completely initially, and then appears to void per Valsalva effort.   Voided volume: 202 mL.  Maximum flow rate: 22 mL/sec.  Average flow rate: 5.9 mL/sec.  Postvoid Residual: 108 mL.  EMG activity: Increased, which appears to coincide with Valsalva effort, but may also reflect EMG patch contamination.  Character of voiding curve: Intermittent.  BOOI: -38.1 (suggesting no obstruction - see key below)  [obstructed (PAPPAS index [BOOI] ? 40); equivocal (no definite   obstruction; BOOI 20-40); and no obstruction (BOOI ? 20)]    FLUOROSCOPIC IMAGING OF THE BLADDER DURING URODYNAMICS:  Please note, image numbers on UDS tracings correlate with iSite series numbers on PACS images. Fluoroscopy during today's procedure demonstrated a mildly-trabeculated bladder wall without diverticulae or cellules.  No vesicoureteral reflux was observed.  The bladder neck was closed during filling and open during periods of incontinence. After voiding to completion, all catheters were removed and the patient was brought back into the consultation room to further discuss today's study results.      ASSESSMENT/PLAN:  Ms. Judith M Trierweiler is a pleasant 79 year old female with recurrent UTIs and urinary incontinence in setting of hydrocephalus and some dementia who demonstrated the following findings today on urodynamic evaluation:    -Normal bladder capacity (filling ceased at 318 mL) with appropriate filling sensations.  -Multiple  uninhibited detrusor contractions, beginning at fill volume of 128 mL. This pattern continues x5, with peak Pdet reaching 56 cm H20. She does leak during two of these UDCs.   -Good bladder compliance between episodes of DO/DOI.  -No reproducible JAMI.  -Very weak detrusor contraction during voiding attempt; however, the Pves catheter then becomes dislodged. She proceeds to then attempt to empty her bladder through Valsalva effot.  -Normal flow rate (Qmax 22 mL/s) with an intermittent flow curve and incomplete bladder emptying (final  mL).  -Increased EMG activity during voiding, which possibly correlates with patient's Valsalva effort vs. contamination of her EMG patches.   -BOOI does not suggest bladder outlet obstruction.  -Fluoroscopy reveals a mildly-trabeculated bladder wall without diverticulae or cellules.  No vesicoureteral reflux was observed.  The bladder neck was closed during filling and open during periods of incontinence.   -Study findings were reviewed in detail with the patient and her daughter. Given frequent DO, will initiate mirabegron 50 mg daily (will avoid anticholinergics due to patient's history of dementia). Rx sent to her pharmacy.   -The patient will plan to follow up as scheduled with Dr. Carvalho in two months to discuss symptoms on mirabegron.     -As the patient is currently on daily Macrobid, no additional UTI prophylaxis was provided today.     Thank you for allowing me to participate in the care of Ms. Judith M Trierweiler and please don't hesitate to contact me with any questions or concerns.      This procedure was performed under a collaborative agreement with Dr. José Miguel Melton, Professor and  of Urology, [facility].    JYALAN Le, CNP  Department of Urology

## 2020-03-10 NOTE — NURSING NOTE
The following medication was given:     MEDICATION:  Omnipaque (Iohexol Injection) (240mgI/mL)  ROUTE: Provider Administered  SITE: Provider Administered via catheter  DOSE: 200mL  LOT #: [id number]  : Ambarella  EXPIRATION DATE: 9/6/2022  NDC#: [address]-76   Was there drug waste? No    Prior to injection, verified patient identity using patient's name and date of birth.  Due to injection administration, patient instructed to remain in clinic for 15 minutes  afterwards, and to report any adverse reaction to me immediately.    Drug Amount Wasted:  None.  Vial/Syringe: Single dose vial        Maria Alejandra Nicole CMA  3/10/2020  2:57 PM

## 2020-03-10 NOTE — NURSING NOTE
Invasive Procedure Safety Checklist:    Procedure: Urodynamics Study    Action: Complete sections and checkboxes as appropriate.    Pre-procedure:  1. Patient ID Verified with 2 identifiers (Jaja and  or MRN) : YES    2. Procedure and site verified with patient/designee (when able) : YES    3. Accurate consent documentation in medical record : YES    4. H&P (or appropriate assessment) documented in medical record : NO  H&P must be up to 30 days prior to procedure an updated within 24 hours of                 Procedure as applicable.     5. Relevant diagnostic and radiology test results appropriately labeled and displayed as applicable : NO    6. Blood products, implants, devices, and/or special equipment available for the procedure as applicable : NO    7. Procedure site(s) marked with provider initials [Exclusions: ] : NO    8. Marking not required. Reason : Yes  Procedure does not require site marking    Time Out:     Time-Out performed immediately prior to starting procedure, including verbal and active participation of all team members addressing: YES    1. Correct patient identity.  2. Confirmed that the correct side and site are marked.  3. An accurate procedure to be done.  4. Agreement on the procedure to be done.  5. Correct patient position.  6. Relevant images and results are properly labeled and appropriately displayed.  7. The need to administer antibiotics or fluids for irrigation purposes during the procedure as applicable.  8. Safety precautions based on patient history or medication use.    During Procedure: Verification of correct person, site, and procedure occurs any time the responsibility for care of the patient is transferred to another member of the care team.    Maria Alejandra Nicole, [facility]

## 2020-04-09 ENCOUNTER — TELEPHONE (OUTPATIENT)
Dept: GERIATRICS | Facility: CLINIC | Age: 80
End: 2020-04-09

## 2020-04-09 DIAGNOSIS — N32.81 OVERACTIVE BLADDER: ICD-10-CM

## 2020-04-09 RX ORDER — MIRABEGRON 50 MG/1
50 TABLET, EXTENDED RELEASE ORAL DAILY
Qty: 30 TABLET | Refills: 6 | Status: SHIPPED | OUTPATIENT
Start: 2020-04-09 | End: 2020-10-02

## 2020-05-04 ENCOUNTER — PRE VISIT (OUTPATIENT)
Dept: UROLOGY | Facility: CLINIC | Age: 80
End: 2020-05-04

## 2020-05-04 NOTE — TELEPHONE ENCOUNTER
LVM for patient to call clinic back to reschedule her appointment to either May 20th at 9 AM for cystoscopy (spot is on hold for her) or to reschedule out to June/July if patient would like to wait due to current coronavirus pandemic.   Mari Finley LPN      Reason for Visit: Cystoscopy    Diagnosis: rUTIs    Orders/Procedures/Records: in system    Contact Patient: see above    Rooming Requirements: UA dip prior to getting ready for cystoscopy. If positive for Leuks and/or Nitrites, will not do cystoscopy. If positive, send urine for official UA / UC.        Mari Finley LPN  05/04/20  11:13 AM

## 2020-05-05 ENCOUNTER — TELEPHONE (OUTPATIENT)
Dept: UROLOGY | Facility: CLINIC | Age: 80
End: 2020-05-05

## 2020-05-05 NOTE — TELEPHONE ENCOUNTER
Contacted patient's son and helped reschedule cystoscopy out to July. Patient in nursing home with severe alzheimer's.   Mari Finley LPN

## 2020-07-09 ENCOUNTER — VIRTUAL VISIT (OUTPATIENT)
Dept: GERIATRICS | Facility: CLINIC | Age: 80
End: 2020-07-09
Payer: COMMERCIAL

## 2020-07-09 VITALS
HEART RATE: 55 BPM | OXYGEN SATURATION: 92 % | DIASTOLIC BLOOD PRESSURE: 42 MMHG | SYSTOLIC BLOOD PRESSURE: 129 MMHG | RESPIRATION RATE: 18 BRPM

## 2020-07-09 DIAGNOSIS — G30.1 LATE ONSET ALZHEIMER'S DISEASE WITHOUT BEHAVIORAL DISTURBANCE (H): ICD-10-CM

## 2020-07-09 DIAGNOSIS — F02.80 LATE ONSET ALZHEIMER'S DISEASE WITHOUT BEHAVIORAL DISTURBANCE (H): ICD-10-CM

## 2020-07-09 DIAGNOSIS — R29.6 RECURRENT FALLS: Primary | ICD-10-CM

## 2020-07-09 DIAGNOSIS — N18.30 BENIGN HYPERTENSION WITH CKD (CHRONIC KIDNEY DISEASE) STAGE III (H): ICD-10-CM

## 2020-07-09 DIAGNOSIS — I12.9 BENIGN HYPERTENSION WITH CKD (CHRONIC KIDNEY DISEASE) STAGE III (H): ICD-10-CM

## 2020-07-09 NOTE — PROGRESS NOTES
"[facility]   Judith M Trierweiler is being evaluated via a billable video visit due to the restrictions of the Covid-19 pandemic.   The patient has been notified of following:  \"This video visit will be conducted via a call between you and your provider. We have found that certain health care needs can be provided without the need for an in-person physical exam.  This service lets us provide the care you need with a video conversation. If during the course of the call the provider feels a video visit is not appropriate, you will not be charged for this service.\"   The provider has received verbal consent for a Video Visit from the patient or first contact? Yes  Patient  or facility staff would like the video invitation sent by: Text to cell phone: [phone]  Video Start Time: 1235    [place] Medical Record Number:  [id number]  Place of Location at the time of visit: [facility]  Chief Complaint   Patient presents with     Video Visit     Fall     HPI:  Judith M Trierweiler  is a 79 year old (1940), who is being seen today for a visit.  HPI information obtained from: facility chart records, facility staff, patient report and [facility] chart review. Today's concern is: falls, HTN, alzheimer's.  S/p fall yesterday. Found sitting on floor, no apparent inj. Was noted to have HR in 50s..  Has h/o falls, however freq. Sig. Decreased over past couple mos. Transferred to secure memory care unit. Followed by Urology for urinary freq. Recurrent UTIs. Started on myrbetriq.  Cont. On macrobid for UTI prophylaxis.  No reports of increased confusion.  For HTN cont. On lopressor, hydrochlorothiazide.  BP stable today. HR 55. No reports of dizziness. No LE edema. Staff reports po intake stable.       Past Medical and Surgical History reviewed in Epic today.  MEDICATIONS:      REVIEW OF SYSTEMS: Unobtainable secondary to cognitive impairment. Denies pain today  Objective: /42   Pulse 55 "   Resp 18   SpO2 92%   Limited visit exam done given COVID-19 precautions.   GENERAL APPEARANCE:  Alert, in no distress, cooperative  ENT:  Mouth and posterior oropharynx normal, moist mucous membranes, [place]  EYES:  EOM normal, Conjunctiva and lids normal  NECK:  FROM  RESP:  lungs clear to auscultation , no respiratory distress  CV:  no edema, bradycardic HR 55  M/S:   muscle strength appears 5/5 UEs, some Gen. LE weakness.  NEURO:   Cranial nerves 2-12 are normal tested and grossly at patient's baseline, speech clear  PSYCH:  insight and judgement impaired, memory impaired , affect and mood normal  Labs:     Most Recent 3 CBC's:  Recent Labs   Lab Test 02/17/20 01/07/20   WBC 7.8  --    HGB 11.2* 11.5*   MCV 98  --      --      Most Recent 3 BMP's:  Recent Labs   Lab Test 03/03/20 02/17/20 02/11/20    144 141   POTASSIUM 4.4 4.9 5.1*   CHLORIDE 105 110* 109*   CO2 24 25 26   BUN 35* 37* 36*   CR 1.20* 1.47* 1.33*   ANIONGAP 7 9 6   SLIME 9.1 9.2 9.5   GLC 67* 87 82       ASSESSMENT/PLAN:  Recurrent falls  Sig. Decreased freq. In falls. Fall yesterday, no apparent inj. Neuros stable  1. Monitor for s/s UTI  2. Cont. To assist with transfers  3. Monitor for reports of increased urinary freq. F/u with urology 7/22/20  4. Monitor for further increased LE weakness, changes in gait, increased LE edema    Benign hypertension with CKD (chronic kidney disease) stage III (H)  BPs gen. Stable. Bradycardia at times over past couple days. No LE edema or resp. Distress  1. Cont. Hydrochlorothiazide  2. Cont. Bid lopressor  3. Add BP, HR prior to admin. Of lopressor with parameter to hold if HR<55, SBP,100. Reassess VS over next few days  4. For HRs<55 over next few days, may decrease lopressor dose  5. Cbc, bmp next week    Late onset Alzheimer's disease without behavioral disturbance (H)  Memory loss. Mood, behaviors gen. Stable  1. Cont. celexa  2. Monitor for increased confusion  3. Cont. Secured memory care  unit      Electronically signed by:  JAYLAN Morales CNP     Video-Visit Details  Type of service:  Video Visit  Video End Time (time video stopped): 1242  Distant Location (provider location):  [facility]

## 2020-07-09 NOTE — LETTER
"    7/9/2020        RE: Judith M Trierweiler [facility] [address]  [facility] [address]        [facility]   Judith M Trierweiler is being evaluated via a billable video visit due to the restrictions of the Covid-19 pandemic.   The patient has been notified of following:  \"This video visit will be conducted via a call between you and your provider. We have found that certain health care needs can be provided without the need for an in-person physical exam.  This service lets us provide the care you need with a video conversation. If during the course of the call the provider feels a video visit is not appropriate, you will not be charged for this service.\"   The provider has received verbal consent for a Video Visit from the patient or first contact? Yes  Patient  or facility staff would like the video invitation sent by: Text to cell phone: [phone]  Video Start Time: 1235    [facility] Medical Record Number:  [id number]  Place of Location at the time of visit: [facility]  Chief Complaint   Patient presents with     Video Visit     Fall     HPI:  Judith M Trierweiler  is a 79 year old (1940), who is being seen today for a visit.  HPI information obtained from: facility chart records, facility staff, patient report and [facility] chart review. Today's concern is: falls, HTN, alzheimer's.  S/p fall yesterday. Found sitting on floor, no apparent inj. Was noted to have HR in 50s..  Has h/o falls, however freq. Sig. Decreased over past couple mos. Transferred to secure memory care unit. Followed by Urology for urinary freq. Recurrent UTIs. Started on myrbetriq.  Cont. On macrobid for UTI prophylaxis.  No reports of increased confusion.  For HTN cont. On lopressor, hydrochlorothiazide.  BP stable today. HR 55. No reports of dizziness. No LE edema. Staff reports po intake stable.       Past Medical and Surgical History reviewed in Epic " today.  MEDICATIONS:      REVIEW OF SYSTEMS: Unobtainable secondary to cognitive impairment. Denies pain today  Objective: /42   Pulse 55   Resp 18   SpO2 92%   Limited visit exam done given COVID-19 precautions.   GENERAL APPEARANCE:  Alert, in no distress, cooperative  ENT:  Mouth and posterior oropharynx normal, moist mucous membranes, [place]  EYES:  EOM normal, Conjunctiva and lids normal  NECK:  FROM  RESP:  lungs clear to auscultation , no respiratory distress  CV:  no edema, bradycardic HR 55  M/S:   muscle strength appears 5/5 UEs, some Gen. LE weakness.  NEURO:   Cranial nerves 2-12 are normal tested and grossly at patient's baseline, speech clear  PSYCH:  insight and judgement impaired, memory impaired , affect and mood normal  Labs:     Most Recent 3 CBC's:  Recent Labs   Lab Test 02/17/20 01/07/20   WBC 7.8  --    HGB 11.2* 11.5*   MCV 98  --      --      Most Recent 3 BMP's:  Recent Labs   Lab Test 03/03/20 02/17/20 02/11/20    144 141   POTASSIUM 4.4 4.9 5.1*   CHLORIDE 105 110* 109*   CO2 24 25 26   BUN 35* 37* 36*   CR 1.20* 1.47* 1.33*   ANIONGAP 7 9 6   SLIME 9.1 9.2 9.5   GLC 67* 87 82       ASSESSMENT/PLAN:  Recurrent falls  Sig. Decreased freq. In falls. Fall yesterday, no apparent inj. Neuros stable  1. Monitor for s/s UTI  2. Cont. To assist with transfers  3. Monitor for reports of increased urinary freq. F/u with urology 7/22/20  4. Monitor for further increased LE weakness, changes in gait, increased LE edema    Benign hypertension with CKD (chronic kidney disease) stage III (H)  BPs gen. Stable. Bradycardia at times over past couple days. No LE edema or resp. Distress  1. Cont. Hydrochlorothiazide  2. Cont. Bid lopressor  3. Add BP, HR prior to admin. Of lopressor with parameter to hold if HR<55, SBP,100. Reassess VS over next few days  4. For HRs<55 over next few days, may decrease lopressor dose  5. Cbc, bmp next week    Late onset Alzheimer's disease without behavioral  disturbance (H)  Memory loss. Mood, behaviors gen. Stable  1. Cont. celexa  2. Monitor for increased confusion  3. Cont. Secured memory care unit      Electronically signed by:  JAYLAN Moarles CNP     Video-Visit Details  Type of service:  Video Visit  Video End Time (time video stopped): 1242  Distant Location (provider location):  [facility]             Sincerely,        JAYLAN Morales CNP

## 2020-07-13 ENCOUNTER — RECORDS - HEALTHEAST (OUTPATIENT)
Dept: LAB | Facility: CLINIC | Age: 80
End: 2020-07-13

## 2020-07-14 LAB
ANION GAP SERPL CALCULATED.3IONS-SCNC: 8 MMOL/L (ref 5–18)
BASOPHILS # BLD AUTO: 0 THOU/UL (ref 0–0.2)
BASOPHILS NFR BLD AUTO: 0 % (ref 0–2)
BUN SERPL-MCNC: 30 MG/DL (ref 8–28)
CALCIUM SERPL-MCNC: 9.6 MG/DL (ref 8.5–10.5)
CHLORIDE BLD-SCNC: 108 MMOL/L (ref 98–107)
CO2 SERPL-SCNC: 23 MMOL/L (ref 22–31)
CREAT SERPL-MCNC: 1.31 MG/DL (ref 0.6–1.1)
EOSINOPHIL # BLD AUTO: 0.2 THOU/UL (ref 0–0.4)
EOSINOPHIL NFR BLD AUTO: 2 % (ref 0–6)
ERYTHROCYTE [DISTWIDTH] IN BLOOD BY AUTOMATED COUNT: 12.9 % (ref 11–14.5)
GFR SERPL CREATININE-BSD FRML MDRD: 39 ML/MIN/1.73M2
GLUCOSE BLD-MCNC: 118 MG/DL (ref 70–125)
HCT VFR BLD AUTO: 34.2 % (ref 35–47)
HGB BLD-MCNC: 10.7 G/DL (ref 12–16)
LYMPHOCYTES # BLD AUTO: 1 THOU/UL (ref 0.8–4.4)
LYMPHOCYTES NFR BLD AUTO: 14 % (ref 20–40)
MCH RBC QN AUTO: 31.1 PG (ref 27–34)
MCHC RBC AUTO-ENTMCNC: 31.3 G/DL (ref 32–36)
MCV RBC AUTO: 99 FL (ref 80–100)
MONOCYTES # BLD AUTO: 0.6 THOU/UL (ref 0–0.9)
MONOCYTES NFR BLD AUTO: 8 % (ref 2–10)
NEUTROPHILS # BLD AUTO: 5.3 THOU/UL (ref 2–7.7)
NEUTROPHILS NFR BLD AUTO: 75 % (ref 50–70)
PLATELET # BLD AUTO: 211 THOU/UL (ref 140–440)
PMV BLD AUTO: 10.3 FL (ref 8.5–12.5)
POTASSIUM BLD-SCNC: 4.9 MMOL/L (ref 3.5–5)
RBC # BLD AUTO: 3.44 MILL/UL (ref 3.8–5.4)
SODIUM SERPL-SCNC: 139 MMOL/L (ref 136–145)
WBC: 7.1 THOU/UL (ref 4–11)

## 2020-09-10 ENCOUNTER — ASSISTED LIVING VISIT (OUTPATIENT)
Dept: GERIATRICS | Facility: CLINIC | Age: 80
End: 2020-09-10
Payer: COMMERCIAL

## 2020-09-10 VITALS
HEART RATE: 64 BPM | DIASTOLIC BLOOD PRESSURE: 69 MMHG | OXYGEN SATURATION: 93 % | RESPIRATION RATE: 18 BRPM | SYSTOLIC BLOOD PRESSURE: 139 MMHG

## 2020-09-10 DIAGNOSIS — N32.81 OVERACTIVE BLADDER: ICD-10-CM

## 2020-09-10 DIAGNOSIS — M25.512 LEFT SHOULDER PAIN, UNSPECIFIED CHRONICITY: Primary | ICD-10-CM

## 2020-09-10 DIAGNOSIS — R29.898 WEAKNESS OF BOTH LOWER EXTREMITIES: ICD-10-CM

## 2020-09-10 RX ORDER — LISINOPRIL 20 MG/1
20 TABLET ORAL DAILY
COMMUNITY
End: 2020-12-01

## 2020-09-10 NOTE — LETTER
9/10/2020        RE: Judith M Trierweiler [facility]  [address]  [address]  [facility] [address]        [facility]  [facility] Medical Record Number: [id number]  Place of Service where encounter took place: [facility] [[id number]]  Chief Complaint   Patient presents with     Pain       HPI:    Judith M Trierweiler is a 79 year old (1940), who is being seen today for an episodic care visit. HPI information obtained from: facility chart records, facility staff, patient report, [facility] chart review and family/first contact son report. Today's concern is: L shoulder pain, weakness, overactive bladder.  Per staff, had s/o bilat UE pain, back pain yesterday.  No recent falls.  Has had decreased act. Level. Has ahd sig. Decreased amb.-requires gait belt, walker -amb. Short distances.  Cont. To assisted stand/pivot transfers.  Has h/o overactive bladder. Followed by Urology. Cont. On myrbetriq, macrobid, estriol cream.  No recent UTIs.  occ urinary incont. Overnights.       Past Medical and Surgical History reviewed in Epic today.    MEDICATIONS:      REVIEW OF SYSTEMS:  Unobtainable secondary to cognitive impairment. Reports occ L shoulder pain    Objective:  /69   Pulse 64   Resp 18   SpO2 93%   Exam:  GENERAL APPEARANCE:  Alert, in no distress, cooperative  ENT:  Mouth and posterior oropharynx normal, moist mucous membranes, [place]  EYES:  EOM, conjunctivae, lids, pupils and irises normal, PERRL  NECK:  No adenopathy,masses or thyromegaly, FROM  RESP:  respiratory effort and palpation of chest normal, lungs clear to auscultation , no respiratory distress  CV:  Palpation and auscultation of heart done , regular rate and rhythm, no murmur, rub, or gallop, no edema  ABDOMEN:  normal bowel sounds, soft, nontender, no hepatosplenomegaly or other masses, no guarding or rebound  M/S:   muscle strength 5/5 UEs, gen. LE weakness.  no pain with ROM bilat  shoulders, some pain with palp. L shoulder  NEURO:   Cranial nerves 2-12 are normal tested and grossly at patient's baseline, speech clear  PSYCH:  insight and judgement impaired, memory impaired , affect and mood normal    Labs:     Most Recent 3 CBC's:  Recent Labs   Lab Test 02/17/20 01/07/20   WBC 7.8  --    HGB 11.2* 11.5*   MCV 98  --      --      Most Recent 3 BMP's:  Recent Labs   Lab Test 03/03/20 02/17/20 02/11/20    144 141   POTASSIUM 4.4 4.9 5.1*   CHLORIDE 105 110* 109*   CO2 24 25 26   BUN 35* 37* 36*   CR 1.20* 1.47* 1.33*   ANIONGAP 7 9 6   SLIME 9.1 9.2 9.5   GLC 67* 87 82       ASSESSMENT/PLAN:  Left shoulder pain, unspecified chronicity  Newer onset. Good ROM L shoulder  1. Start bid aspercreme to site  2. Cont. Tylenol  3. Refer to OT  4. Reassess over next couple weeks    Weakness of both lower extremities  Ongoing. Decreased amb.  No recent falls  1. Refer to PT  2. Monitor for reports of LE pain  3. Increased act. Level as michael  4. Monitor for attempts to self-transfer, falls    Overactive bladder  Gen. Stable.  Ongoing overnight urinary incont. No recent UTI  1. Cont. myrbetriq  2. Cont. Macrobid, estriol cream  3. Monitor for reports of dysuria, fever  4. Sone to make f/u Urology appt      Electronically signed by:  JAYLAN Morales CNP         Sincerely,        JAYLAN Morales CNP

## 2020-09-10 NOTE — PROGRESS NOTES
[place] [facility]  [place] Medical Record Number: [id number]  Place of Service where encounter took place: [facility]  [facility] ([facility]) [[id number]]  Chief Complaint   Patient presents with     Pain       HPI:    Judith M Trierweiler is a 79 year old (1940), who is being seen today for an episodic care visit. HPI information obtained from: facility chart records, facility staff, patient report, [facility] chart review and family/first contact son report. Today's concern is: L shoulder pain, weakness, overactive bladder.  Per staff, had s/o bilat UE pain, back pain yesterday.  No recent falls.  Has had decreased act. Level. Has ahd sig. Decreased amb.-requires gait belt, walker -amb. Short distances.  Cont. To assisted stand/pivot transfers.  Has h/o overactive bladder. Followed by Urology. Cont. On myrbetriq, macrobid, estriol cream.  No recent UTIs.  occ urinary incont. Overnights.       Past Medical and Surgical History reviewed in Epic today.    MEDICATIONS:      REVIEW OF SYSTEMS:  Unobtainable secondary to cognitive impairment. Reports occ L shoulder pain    Objective:  /69   Pulse 64   Resp 18   SpO2 93%   Exam:  GENERAL APPEARANCE:  Alert, in no distress, cooperative  ENT:  Mouth and posterior oropharynx normal, moist mucous membranes, [place]  EYES:  EOM, conjunctivae, lids, pupils and irises normal, PERRL  NECK:  No adenopathy,masses or thyromegaly, FROM  RESP:  respiratory effort and palpation of chest normal, lungs clear to auscultation , no respiratory distress  CV:  Palpation and auscultation of heart done , regular rate and rhythm, no murmur, rub, or gallop, no edema  ABDOMEN:  normal bowel sounds, soft, nontender, no hepatosplenomegaly or other masses, no guarding or rebound  M/S:   muscle strength 5/5 UEs, gen. LE weakness.  no pain with ROM bilat shoulders, some pain with palp. L shoulder  NEURO:   Cranial nerves 2-12 are normal tested and grossly at patient's  baseline, speech clear  PSYCH:  insight and judgement impaired, memory impaired , affect and mood normal    Labs:     Most Recent 3 CBC's:  Recent Labs   Lab Test 02/17/20 01/07/20   WBC 7.8  --    HGB 11.2* 11.5*   MCV 98  --      --      Most Recent 3 BMP's:  Recent Labs   Lab Test 03/03/20 02/17/20 02/11/20    144 141   POTASSIUM 4.4 4.9 5.1*   CHLORIDE 105 110* 109*   CO2 24 25 26   BUN 35* 37* 36*   CR 1.20* 1.47* 1.33*   ANIONGAP 7 9 6   SLIME 9.1 9.2 9.5   GLC 67* 87 82       ASSESSMENT/PLAN:  Left shoulder pain, unspecified chronicity  Newer onset. Good ROM L shoulder  1. Start bid aspercreme to site  2. Cont. Tylenol  3. Refer to OT  4. Reassess over next couple weeks    Weakness of both lower extremities  Ongoing. Decreased amb.  No recent falls  1. Refer to PT  2. Monitor for reports of LE pain  3. Increased act. Level as michael  4. Monitor for attempts to self-transfer, falls    Overactive bladder  Gen. Stable.  Ongoing overnight urinary incont. No recent UTI  1. Cont. myrbetriq  2. Cont. Macrobid, estriol cream  3. Monitor for reports of dysuria, fever  4. Sone to make f/u Urology appt      Electronically signed by:  JAYLAN Morales CNP

## 2020-09-14 ENCOUNTER — RECORDS - HEALTHEAST (OUTPATIENT)
Dept: LAB | Facility: CLINIC | Age: 80
End: 2020-09-14

## 2020-09-15 ENCOUNTER — TRANSFERRED RECORDS (OUTPATIENT)
Dept: HEALTH INFORMATION MANAGEMENT | Facility: CLINIC | Age: 80
End: 2020-09-15

## 2020-09-15 LAB
ANION GAP SERPL CALCULATED.3IONS-SCNC: 9 MMOL/L (ref 5–18)
ANION GAP SERPL CALCULATED.3IONS-SCNC: 9 MMOL/L (ref 5–18)
BASOPHILS # BLD AUTO: 0 THOU/UL (ref 0–0.2)
BASOPHILS NFR BLD AUTO: 0 % (ref 0–2)
BUN SERPL-MCNC: 40 MG/DL (ref 8–28)
BUN SERPL-MCNC: 40 MG/DL (ref 8–28)
CALCIUM SERPL-MCNC: 9.5 MG/DL (ref 8.5–10.5)
CALCIUM SERPL-MCNC: 9.5 MG/DL (ref 8.5–10.5)
CHLORIDE BLD-SCNC: 110 MMOL/L (ref 98–107)
CHLORIDE SERPLBLD-SCNC: 110 MMOL/L (ref 98–107)
CO2 SERPL-SCNC: 21 MMOL/L (ref 22–31)
CO2 SERPL-SCNC: 21 MMOL/L (ref 22–31)
CREAT SERPL-MCNC: 1.34 MG/DL (ref 0.6–1.1)
CREAT SERPL-MCNC: 1.34 MG/DL (ref 0.6–1.1)
EOSINOPHIL # BLD AUTO: 0.2 THOU/UL (ref 0–0.4)
EOSINOPHIL NFR BLD AUTO: 2 % (ref 0–6)
ERYTHROCYTE [DISTWIDTH] IN BLOOD BY AUTOMATED COUNT: 12.1 % (ref 11–14.5)
GFR SERPL CREATININE-BSD FRML MDRD: 38 ML/MIN/1.73M2
GFR SERPL CREATININE-BSD FRML MDRD: 38 ML/MIN/1.73M2
GLUCOSE BLD-MCNC: 63 MG/DL (ref 70–125)
GLUCOSE SERPL-MCNC: 63 MG/DL (ref 70–125)
HCT VFR BLD AUTO: 32 % (ref 35–47)
HGB BLD-MCNC: 10.3 G/DL (ref 12–16)
IMM GRANULOCYTES # BLD: 0 THOU/UL
IMM GRANULOCYTES NFR BLD: 1 %
LYMPHOCYTES # BLD AUTO: 1 THOU/UL (ref 0.8–4.4)
LYMPHOCYTES NFR BLD AUTO: 14 % (ref 20–40)
MCH RBC QN AUTO: 31.6 PG (ref 27–34)
MCHC RBC AUTO-ENTMCNC: 32.2 G/DL (ref 32–36)
MCV RBC AUTO: 98 FL (ref 80–100)
MONOCYTES # BLD AUTO: 0.7 THOU/UL (ref 0–0.9)
MONOCYTES NFR BLD AUTO: 10 % (ref 2–10)
NEUTROPHILS # BLD AUTO: 5.2 THOU/UL (ref 2–7.7)
NEUTROPHILS NFR BLD AUTO: 73 % (ref 50–70)
PLATELET # BLD AUTO: 214 THOU/UL (ref 140–440)
PMV BLD AUTO: 10.3 FL (ref 8.5–12.5)
POTASSIUM BLD-SCNC: 4.7 MMOL/L (ref 3.5–5)
POTASSIUM SERPL-SCNC: 4.7 MMOL/L (ref 3.5–5)
RBC # BLD AUTO: 3.26 MILL/UL (ref 3.8–5.4)
SODIUM SERPL-SCNC: 140 MMOL/L (ref 136–145)
SODIUM SERPL-SCNC: 140 MMOL/L (ref 136–145)
WBC: 7.2 THOU/UL (ref 4–11)

## 2020-10-01 ENCOUNTER — ASSISTED LIVING VISIT (OUTPATIENT)
Dept: GERIATRICS | Facility: CLINIC | Age: 80
End: 2020-10-01
Payer: COMMERCIAL

## 2020-10-01 VITALS
HEART RATE: 78 BPM | SYSTOLIC BLOOD PRESSURE: 131 MMHG | OXYGEN SATURATION: 94 % | RESPIRATION RATE: 18 BRPM | DIASTOLIC BLOOD PRESSURE: 67 MMHG | TEMPERATURE: 98.1 F

## 2020-10-01 DIAGNOSIS — M25.512 LEFT SHOULDER PAIN, UNSPECIFIED CHRONICITY: Primary | ICD-10-CM

## 2020-10-01 DIAGNOSIS — I12.9 BENIGN HYPERTENSION WITH CKD (CHRONIC KIDNEY DISEASE) STAGE III (H): ICD-10-CM

## 2020-10-01 DIAGNOSIS — N32.81 OVERACTIVE BLADDER: ICD-10-CM

## 2020-10-01 DIAGNOSIS — N18.30 BENIGN HYPERTENSION WITH CKD (CHRONIC KIDNEY DISEASE) STAGE III (H): ICD-10-CM

## 2020-10-01 NOTE — LETTER
10/1/2020        RE: Judith M Trierweiler [facility] [facility] [place]  [address]  [address]  [facility] [address]        [place] [facility]  Chief Complaint   Patient presents with     Annual Comprehensive Exam Assisted Living     [place] Medical Record Number: [id number]  Place of Service where encounter took place: [facility] [[id number]]    HPI:    Judith M Trierweiler is a 80 year old  (1940), who is being seen today for an annual comprehensive visit. HPI information obtained from: facility chart records, facility staff, patient report, [facility] chart review and family/first contact son report. Today's concerns are:  Left shoulder pain, unspecified chronicity  Improved.  Newer onset acute L shoulder pain. Started with PT/OT.  Taking tylenol, aspercreme    Benign hypertension with CKD (chronic kidney disease) stage III  Based on JNC-8 goals,  patients age of 80 year old, presence of diabetes or CKD, and goals of care goal BP is  <140/90 mm Hg. Patient is stable with current plan of care and routine assessment..    Overactive bladder  No recent increase in s/s. No recent UTI.  Cont. On macrobid, myrbetriq.        ALLERGIES: Lactose and Penicillins  PAST MEDICAL HISTORY:  has a past medical history of Arthritis, Cataract, Cerebral infarction (H), CKD (chronic kidney disease), Essential hypertension, benign, Falls frequently, Gastroesophageal reflux disease, Gout, Hemorrhoid, History of duodenal ulcer, History of DVT (deep vein thrombosis), History of GI bleed, Hyperlipidemia, Macular degeneration, Psoriasis, and Vitreous hemorrhage (H).  PAST SURGICAL HISTORY:  has a past surgical history that includes NONSPECIFIC PROCEDURE; NONSPECIFIC PROCEDURE; and CATARACT.  IMMUNIZATIONS:  Immunization History   Administered Date(s) Administered     Flu, Unspecified 10/18/2004, 10/05/2006     [id number] Novel Flu 01/11/2010     Influenza (High Dose) 3 valent vaccine 10/07/2013,  11/30/2015, 12/03/2016, 10/05/2017, 12/13/2018, 11/22/2019     Influenza (IIV3) PF 10/13/2011, 09/06/2012     Pneumo Conj 13-V (2010&after) 12/01/2016     Pneumococcal 23 valent 09/11/2007     Td (Adult), Adsorbed 11/10/2004     Zoster vaccine, live 01/12/2012     Above immunizations pulled from [facility]. [facility] and facility records also reconciled. Outstanding information sent to  to update [facility] 10/1/20.  Future immunizations needed:  PPSV23 and yearly influenza per facility protocol          Case Management:  I have reviewed the Assisted Living care plan, current immunizations and preventive care/cancer screening. .Future cancer screening is not clinically indicated secondary to age/goals of care Patient's desire to return to the community is not assessible due to cognitive impairment. Current Level of Care is appropriate.    Advance Directive Discussion:    I reviewed the current advanced directives as reflected in EPIC, the POLST and the facility chart, and verified the congruency of orders 10/1/20. I contacted the first party son and discussed the plan of Care.  I did not due to cognitive impairment review the advance directives with the resident.     Team Discussion:  I communicated with the appropriate disciplines involved with the Plan of Care:   Nursing    Patient's goal is unobtainable secondary to cognitive impairment.  Information reviewed:  Medications, vital signs, orders, and nursing notes.    ROS:  Unobtainable secondary to cognitive impairment. Reports L shoulder feeling good    Vitals:  /67   Pulse 78   Temp 98.1  F (36.7  C)   Resp 18   SpO2 94%  There is no height or weight on file to calculate BMI.  Exam:  GENERAL APPEARANCE:  Alert, in no distress, cooperative  ENT:  Mouth and posterior oropharynx normal, moist mucous membranes, [place]  EYES:  EOM, conjunctivae, lids, pupils and irises normal, PERRL  NECK:  No adenopathy,masses or thyromegaly, FROM  RESP:   respiratory effort and palpation of chest normal, lungs clear to auscultation , no respiratory distress  CV:  Palpation and auscultation of heart done , regular rate and rhythm, no murmur, rub, or gallop, no edema  ABDOMEN:  normal bowel sounds, soft, nontender, no hepatosplenomegaly or other masses, no guarding or rebound  M/S:   muscle strength 5/5 UEs, gen LE weakness.  no apparent pain with ROM bilat shoulders  NEURO:   Cranial nerves 2-12 are normal tested and grossly at patient's baseline, speech clear  PSYCH:  insight and judgement impaired, memory impaired , affect and mood normal     Lab/Diagnostic data:     Most Recent 3 CBC's:  Recent Labs   Lab Test 02/17/20 01/07/20   WBC 7.8  --    HGB 11.2* 11.5*   MCV 98  --      --      Most Recent 3 BMP's:  Recent Labs   Lab Test 09/15/20 03/03/20 02/17/20    136 144   POTASSIUM 4.7 4.4 4.9   CHLORIDE 110* 105 110*   CO2 21* 24 25   BUN 40* 35* 37*   CR 1.34* 1.20* 1.47*   ANIONGAP 9 7 9   SLIME 9.5 9.1 9.2   GLC 63* 67* 87       ASSESSMENT/PLAN  Left shoulder pain, unspecified chronicity  Currently stable  1. Cont. PT/OT. Work to increased LE strength .  Monitor for shoulder pain with wb while using walker  2. Cont. Tylenol, prn aspercreme  3. For further increased shoulder pain, may sched. aspercreme bid  4. Monitor for difficulty with ADLs      Benign hypertension with CKD (chronic kidney disease) stage III  BPs currently stable  1. Cont. Norvasc, hydrochlorothiazide, metoprolol  2. Follow BPs, HRs  3. Encourage fluids  4. Cbc, bmp in next 1-3 mos    Overactive bladder  No recent increased s/s.  No recent UTI  1. Cont. macrobid  2. Cont. myrbetriq  3. Monitor for dysuria, fever, increased confusion  4. Spoke with son who will make Urology f/u appt      Electronically signed by:  JAYLAN Morales CNP           Sincerely,        JAYLAN Morales CNP

## 2020-10-01 NOTE — PROGRESS NOTES
[place] [facility]  Chief Complaint   Patient presents with     Annual Comprehensive Exam Assisted Living     [place] Medical Record Number: [id number]  Place of Service where encounter took place: [facility] [[id number]]    HPI:    Judith M Trierweiler is a 80 year old  (1940), who is being seen today for an annual comprehensive visit. HPI information obtained from: facility chart records, facility staff, patient report, [facility] chart review and family/first contact son report. Today's concerns are:  Left shoulder pain, unspecified chronicity  Improved.  Newer onset acute L shoulder pain. Started with PT/OT.  Taking tylenol, aspercreme    Benign hypertension with CKD (chronic kidney disease) stage III  Based on JNC-8 goals,  patients age of 80 year old, presence of diabetes or CKD, and goals of care goal BP is  <140/90 mm Hg. Patient is stable with current plan of care and routine assessment..    Overactive bladder  No recent increase in s/s. No recent UTI.  Cont. On macrobid, myrbetriq.        ALLERGIES: Lactose and Penicillins  PAST MEDICAL HISTORY:  has a past medical history of Arthritis, Cataract, Cerebral infarction (H), CKD (chronic kidney disease), Essential hypertension, benign, Falls frequently, Gastroesophageal reflux disease, Gout, Hemorrhoid, History of duodenal ulcer, History of DVT (deep vein thrombosis), History of GI bleed, Hyperlipidemia, Macular degeneration, Psoriasis, and Vitreous hemorrhage (H).  PAST SURGICAL HISTORY:  has a past surgical history that includes NONSPECIFIC PROCEDURE; NONSPECIFIC PROCEDURE; and CATARACT.  IMMUNIZATIONS:  Immunization History   Administered Date(s) Administered     Flu, Unspecified 10/18/2004, 10/05/2006     [id number] Novel Flu 01/11/2010     Influenza (High Dose) 3 valent vaccine 10/07/2013, 11/30/2015, 12/03/2016, 10/05/2017, 12/13/2018, 11/22/2019     Influenza (IIV3) PF 10/13/2011, 09/06/2012     Pneumo  Conj 13-V (2010&after) 12/01/2016     Pneumococcal 23 valent 09/11/2007     Td (Adult), Adsorbed 11/10/2004     Zoster vaccine, live 01/12/2012     Above immunizations pulled from [facility]. [facility] and facility records also reconciled. Outstanding information sent to  to update [facility] 10/1/20.  Future immunizations needed:  PPSV23 and yearly influenza per facility protocol          Case Management:  I have reviewed the Assisted Living care plan, current immunizations and preventive care/cancer screening. .Future cancer screening is not clinically indicated secondary to age/goals of care Patient's desire to return to the community is not assessible due to cognitive impairment. Current Level of Care is appropriate.    Advance Directive Discussion:    I reviewed the current advanced directives as reflected in EPIC, the POLST and the facility chart, and verified the congruency of orders 10/1/20. I contacted the first party son and discussed the plan of Care.  I did not due to cognitive impairment review the advance directives with the resident.     Team Discussion:  I communicated with the appropriate disciplines involved with the Plan of Care:   Nursing    Patient's goal is unobtainable secondary to cognitive impairment.  Information reviewed:  Medications, vital signs, orders, and nursing notes.    ROS:  Unobtainable secondary to cognitive impairment. Reports L shoulder feeling good    Vitals:  /67   Pulse 78   Temp 98.1  F (36.7  C)   Resp 18   SpO2 94%  There is no height or weight on file to calculate BMI.  Exam:  GENERAL APPEARANCE:  Alert, in no distress, cooperative  ENT:  Mouth and posterior oropharynx normal, moist mucous membranes, [place]  EYES:  EOM, conjunctivae, lids, pupils and irises normal, PERRL  NECK:  No adenopathy,masses or thyromegaly, FROM  RESP:  respiratory effort and palpation of chest normal, lungs clear to auscultation , no respiratory distress  CV:  Palpation  and auscultation of heart done , regular rate and rhythm, no murmur, rub, or gallop, no edema  ABDOMEN:  normal bowel sounds, soft, nontender, no hepatosplenomegaly or other masses, no guarding or rebound  M/S:   muscle strength 5/5 UEs, gen LE weakness.  no apparent pain with ROM bilat shoulders  NEURO:   Cranial nerves 2-12 are normal tested and grossly at patient's baseline, speech clear  PSYCH:  insight and judgement impaired, memory impaired , affect and mood normal     Lab/Diagnostic data:     Most Recent 3 CBC's:  Recent Labs   Lab Test 02/17/20 01/07/20   WBC 7.8  --    HGB 11.2* 11.5*   MCV 98  --      --      Most Recent 3 BMP's:  Recent Labs   Lab Test 09/15/20 03/03/20 02/17/20    136 144   POTASSIUM 4.7 4.4 4.9   CHLORIDE 110* 105 110*   CO2 21* 24 25   BUN 40* 35* 37*   CR 1.34* 1.20* 1.47*   ANIONGAP 9 7 9   SLIME 9.5 9.1 9.2   GLC 63* 67* 87       ASSESSMENT/PLAN  Left shoulder pain, unspecified chronicity  Currently stable  1. Cont. PT/OT. Work to increased LE strength .  Monitor for shoulder pain with wb while using walker  2. Cont. Tylenol, prn aspercreme  3. For further increased shoulder pain, may sched. aspercreme bid  4. Monitor for difficulty with ADLs      Benign hypertension with CKD (chronic kidney disease) stage III  BPs currently stable  1. Cont. Norvasc, hydrochlorothiazide, metoprolol  2. Follow BPs, HRs  3. Encourage fluids  4. Cbc, bmp in next 1-3 mos    Overactive bladder  No recent increased s/s.  No recent UTI  1. Cont. macrobid  2. Cont. myrbetriq  3. Monitor for dysuria, fever, increased confusion  4. Spoke with son who will make Urology f/u appt      Electronically signed by:  JAYLAN Morales CNP

## 2020-10-02 RX ORDER — MIRABEGRON 50 MG/1
TABLET, FILM COATED, EXTENDED RELEASE ORAL
Qty: 28 TABLET | Status: SHIPPED | OUTPATIENT
Start: 2020-10-02 | End: 2021-10-04

## 2020-10-22 ENCOUNTER — ASSISTED LIVING VISIT (OUTPATIENT)
Dept: GERIATRICS | Facility: CLINIC | Age: 80
End: 2020-10-22
Payer: COMMERCIAL

## 2020-10-22 VITALS
HEART RATE: 61 BPM | SYSTOLIC BLOOD PRESSURE: 115 MMHG | TEMPERATURE: 96.8 F | RESPIRATION RATE: 20 BRPM | DIASTOLIC BLOOD PRESSURE: 55 MMHG | OXYGEN SATURATION: 90 %

## 2020-10-22 DIAGNOSIS — R05.9 COUGH: Primary | ICD-10-CM

## 2020-10-22 DIAGNOSIS — I12.9 BENIGN HYPERTENSION WITH CKD (CHRONIC KIDNEY DISEASE) STAGE III (H): ICD-10-CM

## 2020-10-22 DIAGNOSIS — M25.512 LEFT SHOULDER PAIN, UNSPECIFIED CHRONICITY: ICD-10-CM

## 2020-10-22 DIAGNOSIS — N18.30 BENIGN HYPERTENSION WITH CKD (CHRONIC KIDNEY DISEASE) STAGE III (H): ICD-10-CM

## 2020-10-22 NOTE — PROGRESS NOTES
[place] [facility]  [place] Medical Record Number: [id number]  Place of Service where encounter took place: [facility]  [facility] ([facility]) [[id number]]  Chief Complaint   Patient presents with     Cough       HPI:    Judith M Trierweiler is a 80 year old (1940), who is being seen today for an episodic care visit. HPI information obtained from: facility chart records, facility staff, patient report and [facility] chart review. Today's concern is: cough, HTn, pain.  Has had ongoing cough.  Afebrile. O2 sats gen. Stable.  Had previous course of mucinex.  No reports of increased MEDINA.  For HTN cont.on norvasc, hydrochlorothiazide, lisinopril, metoprolol.  BPs, HRs stable. No reports of dizziness.  Per staff, po intake stable.  Reports L shoulder pain resolved.  Having some back stiffness in am-reports improved as day goes on.  Completed PT course. Amb.short distances with walker.  Cont. On tylenol, prn aspercreme for pain.       Past Medical and Surgical History reviewed in Epic today.    MEDICATIONS:      REVIEW OF SYSTEMS:  Unobtainable secondary to cognitive impairment. Reports cough today, some back stiffness    Objective:  /55   Pulse 61   Temp 96.8  F (36  C)   Resp 20   SpO2 90%   Exam:  GENERAL APPEARANCE:  Alert, in no distress, cooperative  ENT:  Mouth and posterior oropharynx normal, moist mucous membranes, [place], no rhinitis  EYES:  EOM, conjunctivae, lids, pupils and irises normal, PERRL  NECK:  No adenopathy,masses or thyromegaly, FROM  RESP:  respiratory effort and palpation of chest normal, crackles bibasilar, fine, L>R  CV:  Palpation and auscultation of heart done , regular rate and rhythm, no murmur, rub, or gallop, no edema  ABDOMEN:  normal bowel sounds, soft, nontender, no hepatosplenomegaly or other masses, no guarding or rebound  M/S:   muscle strength 5/5 UEs, gen. LE weakness. no pain with ROM extremities  NEURO:   Cranial nerves 2-12 are normal tested and  grossly at patient's baseline, speech clear  PSYCH:  insight and judgement impaired, memory impaired , affect and mood normal    Labs:     Most Recent 3 CBC's:  Recent Labs   Lab Test 02/17/20 01/07/20   WBC 7.8  --    HGB 11.2* 11.5*   MCV 98  --      --      Most Recent 3 BMP's:  Recent Labs   Lab Test 09/15/20 03/03/20 02/17/20    136 144   POTASSIUM 4.7 4.4 4.9   CHLORIDE 110* 105 110*   CO2 21* 24 25   BUN 40* 35* 37*   CR 1.34* 1.20* 1.47*   ANIONGAP 9 7 9   SLIME 9.5 9.1 9.2   GLC 63* 67* 87       ASSESSMENT/PLAN:  Cough  Ongoing, bibasilar crackles  1. Check CXR today  2. Follow O2 sats, temps  3. Start mucinex  4. Monitor for increased MEDINA  5. For sob, may start alb. MDI    Benign hypertension with CKD (chronic kidney disease) stage III  Currently stable  1. Cont. Norvasc, hydrochlorothiazide, lisinopril, metoprolol  2. Follow BPs, HRs  3. Monitor for dizziness, falls  4. Encourage fluids  5. Cbc, bmp in next 1-3 mos    Left shoulder pain, unspecified chronicity  L shoulder pain resolved. Some back stiffness this am  1. Cont. Tylenol  2. Cont. Prn aspercreme  3. For increased back pain, may sched. aspercreme to back  4. Encourage amb. As michael.       Electronically signed by:  JAYLAN Morales CNP

## 2020-10-22 NOTE — LETTER
10/22/2020        RE: Judith M Trierweiler [facility] [place]  [address]  [address]  [facility] [address]        [place] [facility]  [place] Medical Record Number: [id number]  Place of Service where encounter took place: [facility] ([facility]) [[id number]]  Chief Complaint   Patient presents with     Cough       HPI:    Judith M Trierweiler is a 80 year old (1940), who is being seen today for an episodic care visit. HPI information obtained from: facility chart records, facility staff, patient report and [facility] chart review. Today's concern is: cough, HTn, pain.  Has had ongoing cough.  Afebrile. O2 sats gen. Stable.  Had previous course of mucinex.  No reports of increased MEDINA.  For HTN cont.on norvasc, hydrochlorothiazide, lisinopril, metoprolol.  BPs, HRs stable. No reports of dizziness.  Per staff, po intake stable.  Reports L shoulder pain resolved.  Having some back stiffness in am-reports improved as day goes on.  Completed PT course. Amb.short distances with walker.  Cont. On tylenol, prn aspercreme for pain.       Past Medical and Surgical History reviewed in Epic today.    MEDICATIONS:      REVIEW OF SYSTEMS:  Unobtainable secondary to cognitive impairment. Reports cough today, some back stiffness    Objective:  /55   Pulse 61   Temp 96.8  F (36  C)   Resp 20   SpO2 90%   Exam:  GENERAL APPEARANCE:  Alert, in no distress, cooperative  ENT:  Mouth and posterior oropharynx normal, moist mucous membranes, [place], no rhinitis  EYES:  EOM, conjunctivae, lids, pupils and irises normal, PERRL  NECK:  No adenopathy,masses or thyromegaly, FROM  RESP:  respiratory effort and palpation of chest normal, crackles bibasilar, fine, L>R  CV:  Palpation and auscultation of heart done , regular rate and rhythm, no murmur, rub, or gallop, no edema  ABDOMEN:  normal bowel sounds, soft, nontender, no hepatosplenomegaly or other masses, no guarding or rebound  M/S:   muscle  strength 5/5 UEs, gen. LE weakness. no pain with ROM extremities  NEURO:   Cranial nerves 2-12 are normal tested and grossly at patient's baseline, speech clear  PSYCH:  insight and judgement impaired, memory impaired , affect and mood normal    Labs:     Most Recent 3 CBC's:  Recent Labs   Lab Test 02/17/20 01/07/20   WBC 7.8  --    HGB 11.2* 11.5*   MCV 98  --      --      Most Recent 3 BMP's:  Recent Labs   Lab Test 09/15/20 03/03/20 02/17/20    136 144   POTASSIUM 4.7 4.4 4.9   CHLORIDE 110* 105 110*   CO2 21* 24 25   BUN 40* 35* 37*   CR 1.34* 1.20* 1.47*   ANIONGAP 9 7 9   SLIME 9.5 9.1 9.2   GLC 63* 67* 87       ASSESSMENT/PLAN:  Cough  Ongoing, bibasilar crackles  1. Check CXR today  2. Follow O2 sats, temps  3. Start mucinex  4. Monitor for increased MEDINA  5. For sob, may start alb. MDI    Benign hypertension with CKD (chronic kidney disease) stage III  Currently stable  1. Cont. Norvasc, hydrochlorothiazide, lisinopril, metoprolol  2. Follow BPs, HRs  3. Monitor for dizziness, falls  4. Encourage fluids  5. Cbc, bmp in next 1-3 mos    Left shoulder pain, unspecified chronicity  L shoulder pain resolved. Some back stiffness this am  1. Cont. Tylenol  2. Cont. Prn aspercreme  3. For increased back pain, may sched. aspercreme to back  4. Encourage amb. As michael.       Electronically signed by:  JAYLAN Morales CNP           Sincerely,        JAYLAN Morales CNP

## 2020-11-10 ENCOUNTER — ASSISTED LIVING VISIT (OUTPATIENT)
Dept: GERIATRICS | Facility: CLINIC | Age: 80
End: 2020-11-10
Payer: COMMERCIAL

## 2020-11-10 VITALS
HEIGHT: 59 IN | OXYGEN SATURATION: 93 % | RESPIRATION RATE: 18 BRPM | HEART RATE: 69 BPM | DIASTOLIC BLOOD PRESSURE: 69 MMHG | SYSTOLIC BLOOD PRESSURE: 122 MMHG | TEMPERATURE: 98 F | BODY MASS INDEX: 27.67 KG/M2

## 2020-11-10 DIAGNOSIS — F02.80 LATE ONSET ALZHEIMER'S DISEASE WITHOUT BEHAVIORAL DISTURBANCE (H): ICD-10-CM

## 2020-11-10 DIAGNOSIS — R05.9 COUGH: Primary | ICD-10-CM

## 2020-11-10 DIAGNOSIS — G30.1 LATE ONSET ALZHEIMER'S DISEASE WITHOUT BEHAVIORAL DISTURBANCE (H): ICD-10-CM

## 2020-11-10 DIAGNOSIS — R13.10 DYSPHAGIA, UNSPECIFIED TYPE: ICD-10-CM

## 2020-11-10 NOTE — LETTER
"    11/10/2020        RE: Judith M Trierweiler [facility]  [address]  [address]  [facility] [address]        [place] [facility]  [place] Medical Record Number:  [id number]  Place of Service where encounter took place:  [employer]  [facility] ([facility]) [[id number]]  Chief Complaint   Patient presents with     Cough       HPI:    Judith M Trierweiler  is a 80 year old (1940), who is being seen today for an episodic care visit.  HPI information obtained from: facility chart records, facility staff, patient report and [facility] chart review. Today's concern is: cough, dysphagia. Alzheimer's.  Has had ongoing cough. Last CXR neg for acute changes, has been afebrile. Had course of mucinex.  Today staff reports cough ongoing. Remains afebrile. O2 sats stable.  Is noted to have increased difficulty taking pills and managing oral secretions.  Cont. On reg. Diet. Has memory loss due to Alzheimer's.  No recent changes in speech, mood, behaviors.  Not currently taking psych meds. Not requiring increased assist with meals.       Past Medical and Surgical History reviewed in Epic today.    MEDICATIONS:          REVIEW OF SYSTEMS:  Unobtainable secondary to cognitive impairment. Ongoing cough, o/w reports feeling fine today    Objective:  /69   Pulse 69   Temp 98  F (36.7  C)   Resp 18   Ht 1.499 m (4' 11\")   SpO2 93%   BMI 27.67 kg/m    Exam:  GENERAL APPEARANCE:  Alert, in no distress, cooperative  ENT:  Mouth and posterior oropharynx normal, moist mucous membranes, [place], no rhinitis  EYES:  EOM, conjunctivae, lids, pupils and irises normal, PERRL  NECK:  No adenopathy,masses or thyromegaly, FROM  RESP:  respiratory effort and palpation of chest normal, crackles LLL. no sob with conversation. n.p. cough present  CV:  Palpation and auscultation of heart done , regular rate and rhythm, no murmur, rub, or gallop, no edema  ABDOMEN:  normal bowel sounds, soft, nontender, no " hepatosplenomegaly or other masses, no guarding or rebound  M/S:   muscle strength 5/5 UEs, gen.LE weakness  NEURO:   Cranial nerves 2-12 are normal tested and grossly at patient's baseline, speech clear  PSYCH:  insight and judgement impaired, memory impaired , affect and mood normal    Labs:     Most Recent 3 CBC's:  Recent Labs   Lab Test 02/17/20 01/07/20   WBC 7.8  --    HGB 11.2* 11.5*   MCV 98  --      --      Most Recent 3 BMP's:  Recent Labs   Lab Test 09/15/20 03/03/20 02/17/20    136 144   POTASSIUM 4.7 4.4 4.9   CHLORIDE 110* 105 110*   CO2 21* 24 25   BUN 40* 35* 37*   CR 1.34* 1.20* 1.47*   ANIONGAP 9 7 9   SLIME 9.5 9.1 9.2   GLC 63* 67* 87       ASSESSMENT/PLAN:  Cough  Ongoing. Afebrile. Crackles LLL  1. Repeat CXR, compare with previous  2. Follow O2 sats, temp  3. Cont. mucinex as needed    Dysphagia, unspecified type  Difficulty taking pills, managing oral secretions  - HOME CARE ST referral  2. Monitor for coughing with po intake  3. Monitor for changes in voice quality  4. CXR as above    Late onset Alzheimer's disease without behavioral disturbance (H)  Memory loss. No changes in speech, no recent loss of language.  Mood, behaviors gen. Stable. No recent falls  1. Monitor for changes in mood, behaviors  2. Monitor for further difficulty taking pills-ST to eval as above  3. Cont. Secured memory care unit  4. Monitor for attempts to self-transfer, falls      Electronically signed by:  JAYLAN Morales CNP                 Sincerely,        JAYLAN Morales CNP

## 2020-11-10 NOTE — PROGRESS NOTES
"[place] [facility]  [place] Medical Record Number:  [id number]  Place of Service where encounter took place:  [facility] [employer] [facility]  [facility] ([facility]) [[id number]]  Chief Complaint   Patient presents with     Cough       HPI:    Judith M Trierweiler  is a 80 year old (1940), who is being seen today for an episodic care visit.  HPI information obtained from: facility chart records, facility staff, patient report and [facility] chart review. Today's concern is: cough, dysphagia. Alzheimer's.  Has had ongoing cough. Last CXR neg for acute changes, has been afebrile. Had course of mucinex.  Today staff reports cough ongoing. Remains afebrile. O2 sats stable.  Is noted to have increased difficulty taking pills and managing oral secretions.  Cont. On reg. Diet. Has memory loss due to Alzheimer's.  No recent changes in speech, mood, behaviors.  Not currently taking psych meds. Not requiring increased assist with meals.       Past Medical and Surgical History reviewed in Epic today.    MEDICATIONS:          REVIEW OF SYSTEMS:  Unobtainable secondary to cognitive impairment. Ongoing cough, o/w reports feeling fine today    Objective:  /69   Pulse 69   Temp 98  F (36.7  C)   Resp 18   Ht 1.499 m (4' 11\")   SpO2 93%   BMI 27.67 kg/m    Exam:  GENERAL APPEARANCE:  Alert, in no distress, cooperative  ENT:  Mouth and posterior oropharynx normal, moist mucous membranes, [place], no rhinitis  EYES:  EOM, conjunctivae, lids, pupils and irises normal, PERRL  NECK:  No adenopathy,masses or thyromegaly, FROM  RESP:  respiratory effort and palpation of chest normal, crackles LLL. no sob with conversation. n.p. cough present  CV:  Palpation and auscultation of heart done , regular rate and rhythm, no murmur, rub, or gallop, no edema  ABDOMEN:  normal bowel sounds, soft, nontender, no hepatosplenomegaly or other masses, no guarding or rebound  M/S:   muscle strength 5/5 UEs, gen.LE weakness  NEURO:   Cranial " nerves 2-12 are normal tested and grossly at patient's baseline, speech clear  PSYCH:  insight and judgement impaired, memory impaired , affect and mood normal    Labs:     Most Recent 3 CBC's:  Recent Labs   Lab Test 02/17/20 01/07/20   WBC 7.8  --    HGB 11.2* 11.5*   MCV 98  --      --      Most Recent 3 BMP's:  Recent Labs   Lab Test 09/15/20 03/03/20 02/17/20    136 144   POTASSIUM 4.7 4.4 4.9   CHLORIDE 110* 105 110*   CO2 21* 24 25   BUN 40* 35* 37*   CR 1.34* 1.20* 1.47*   ANIONGAP 9 7 9   SLIME 9.5 9.1 9.2   GLC 63* 67* 87       ASSESSMENT/PLAN:  Cough  Ongoing. Afebrile. Crackles LLL  1. Repeat CXR, compare with previous  2. Follow O2 sats, temp  3. Cont. mucinex as needed    Dysphagia, unspecified type  Difficulty taking pills, managing oral secretions  - HOME CARE ST referral  2. Monitor for coughing with po intake  3. Monitor for changes in voice quality  4. CXR as above    Late onset Alzheimer's disease without behavioral disturbance (H)  Memory loss. No changes in speech, no recent loss of language.  Mood, behaviors gen. Stable. No recent falls  1. Monitor for changes in mood, behaviors  2. Monitor for further difficulty taking pills-ST to eval as above  3. Cont. Secured memory care unit  4. Monitor for attempts to self-transfer, falls      Electronically signed by:  JAYLAN Morales CNP

## 2020-11-19 ENCOUNTER — ASSISTED LIVING VISIT (OUTPATIENT)
Dept: GERIATRICS | Facility: CLINIC | Age: 80
End: 2020-11-19
Payer: COMMERCIAL

## 2020-11-19 ENCOUNTER — TRANSFERRED RECORDS (OUTPATIENT)
Dept: HEALTH INFORMATION MANAGEMENT | Facility: CLINIC | Age: 80
End: 2020-11-19

## 2020-11-19 ENCOUNTER — RECORDS - HEALTHEAST (OUTPATIENT)
Dept: LAB | Facility: CLINIC | Age: 80
End: 2020-11-19

## 2020-11-19 VITALS
TEMPERATURE: 97.1 F | RESPIRATION RATE: 22 BRPM | HEART RATE: 58 BPM | SYSTOLIC BLOOD PRESSURE: 122 MMHG | DIASTOLIC BLOOD PRESSURE: 63 MMHG | OXYGEN SATURATION: 92 %

## 2020-11-19 DIAGNOSIS — R29.898 WEAKNESS OF BOTH LOWER EXTREMITIES: ICD-10-CM

## 2020-11-19 DIAGNOSIS — R05.9 COUGH: Primary | ICD-10-CM

## 2020-11-19 DIAGNOSIS — R11.10 NON-INTRACTABLE VOMITING, PRESENCE OF NAUSEA NOT SPECIFIED, UNSPECIFIED VOMITING TYPE: ICD-10-CM

## 2020-11-19 LAB
ALBUMIN UR-MCNC: ABNORMAL MG/DL
ANION GAP SERPL CALCULATED.3IONS-SCNC: 10 MMOL/L (ref 5–18)
APPEARANCE UR: CLEAR
BACTERIA #/AREA URNS HPF: ABNORMAL HPF
BASOPHILS # BLD AUTO: 0 THOU/UL (ref 0–0.2)
BASOPHILS NFR BLD AUTO: 0 % (ref 0–2)
BILIRUB UR QL STRIP: NEGATIVE
BUN SERPL-MCNC: 38 MG/DL (ref 8–28)
CALCIUM SERPL-MCNC: 9.6 MG/DL (ref 8.5–10.5)
CHLORIDE BLD-SCNC: 105 MMOL/L (ref 98–107)
CO2 SERPL-SCNC: 24 MMOL/L (ref 22–31)
COLOR UR AUTO: YELLOW
CREAT SERPL-MCNC: 1.84 MG/DL (ref 0.6–1.1)
EOSINOPHIL # BLD AUTO: 0.2 THOU/UL (ref 0–0.4)
EOSINOPHIL NFR BLD AUTO: 2 % (ref 0–6)
ERYTHROCYTE [DISTWIDTH] IN BLOOD BY AUTOMATED COUNT: 12.7 % (ref 11–14.5)
GFR SERPL CREATININE-BSD FRML MDRD: 26 ML/MIN/1.73M2
GLUCOSE BLD-MCNC: 101 MG/DL (ref 70–125)
GLUCOSE UR STRIP-MCNC: NEGATIVE MG/DL
HCT VFR BLD AUTO: 33.1 % (ref 35–47)
HGB BLD-MCNC: 10.8 G/DL (ref 12–16)
HGB UR QL STRIP: NEGATIVE
IMM GRANULOCYTES # BLD: 0.1 THOU/UL
IMM GRANULOCYTES NFR BLD: 1 %
KETONES UR STRIP-MCNC: NEGATIVE MG/DL
LEUKOCYTE ESTERASE UR QL STRIP: ABNORMAL
LYMPHOCYTES # BLD AUTO: 1.1 THOU/UL (ref 0.8–4.4)
LYMPHOCYTES NFR BLD AUTO: 12 % (ref 20–40)
MCH RBC QN AUTO: 31 PG (ref 27–34)
MCHC RBC AUTO-ENTMCNC: 32.6 G/DL (ref 32–36)
MCV RBC AUTO: 95 FL (ref 80–100)
MONOCYTES # BLD AUTO: 0.8 THOU/UL (ref 0–0.9)
MONOCYTES NFR BLD AUTO: 9 % (ref 2–10)
MUCOUS THREADS #/AREA URNS LPF: ABNORMAL LPF
NEUTROPHILS # BLD AUTO: 7 THOU/UL (ref 2–7.7)
NEUTROPHILS NFR BLD AUTO: 76 % (ref 50–70)
NITRATE UR QL: NEGATIVE
PH UR STRIP: 5 [PH] (ref 4.5–8)
PLATELET # BLD AUTO: 277 THOU/UL (ref 140–440)
PMV BLD AUTO: 10.5 FL (ref 8.5–12.5)
POTASSIUM BLD-SCNC: 4.3 MMOL/L (ref 3.5–5)
RBC # BLD AUTO: 3.48 MILL/UL (ref 3.8–5.4)
RBC #/AREA URNS AUTO: ABNORMAL HPF
RENAL EPI CELLS #/AREA URNS HPF: ABNORMAL LPF
SODIUM SERPL-SCNC: 139 MMOL/L (ref 136–145)
SP GR UR STRIP: 1.01 (ref 1–1.03)
SQUAMOUS #/AREA URNS AUTO: ABNORMAL LPF
TRANS CELLS #/AREA URNS HPF: ABNORMAL LPF
UROBILINOGEN UR STRIP-ACNC: ABNORMAL
WBC #/AREA URNS AUTO: ABNORMAL HPF
WBC CLUMPS #/AREA URNS HPF: PRESENT /[HPF]
WBC: 9.1 THOU/UL (ref 4–11)

## 2020-11-19 RX ORDER — GUAIFENESIN 600 MG/1
600 TABLET, EXTENDED RELEASE ORAL 2 TIMES DAILY
COMMUNITY
End: 2020-12-01

## 2020-11-19 RX ORDER — ONDANSETRON 4 MG/1
TABLET, FILM COATED ORAL EVERY 8 HOURS PRN
COMMUNITY
End: 2020-12-15

## 2020-11-19 NOTE — LETTER
11/19/2020        RE: Judith M Trierweiler [facility]  [address]  [address]  [facility] [address]        [place] [facility]  [place] Medical Record Number: [id number]  Place of Service where encounter took place: [employer] [employer] ([facility]) [[id number]]  Chief Complaint   Patient presents with     Cough     Vomiting       HPI:    Judith M Trierweiler is a 80 year old (1940), who is being seen today for an episodic care visit. HPI information obtained from: facility chart records, facility staff, patient report, [facility] chart review and family/first contact son report. Today's concern is: cough, emesis, weakness.  Has had ongoing cough.  CXR done. 11/10/20 neg for acute changes.  Noted to have some increased MEDINA past couple days,  O2 sats upper 80s at one time yesterday per staff.  Today O2 sats stable. Afebrile.  Emesis x 2 last pm. Also diarrhea.  Does have chronic diarrhea. Has prn imodium.  No diarrhea or emesis this am. Decreased po intake past 2 days.  Did have ST eval-did not appear to have dysphagia.  Amb. Short distances in room, o/w using w.c.       Past Medical and Surgical History reviewed in Epic today.    MEDICATIONS:      REVIEW OF SYSTEMS:  Unobtainable secondary to cognitive impairment. Reports ongoing cough, feeling tired    Objective:  /63   Pulse 58   Temp 97.1  F (36.2  C)   Resp 22   SpO2 92%   Exam:  GENERAL APPEARANCE:  Alert, in no distress, cooperative  ENT:  [place], no rhinitis  EYES:  EOM, conjunctivae, lids, pupils and irises normal, PERRL, no drainage  NECK:  No adenopathy,masses or thyromegaly, FROM  RESP:  no respiratory distress, n.p. cough present with deep inhalation. no wheezing or crackles heard.  noaccessory muscle use  CV:  Palpation and auscultation of heart done , regular rate and rhythm, no murmur, rub, or gallop, no edema  ABDOMEN:  normal bowel sounds, soft, nontender, no hepatosplenomegaly or other masses, no guarding  or rebound  M/S:   muscle strength 5/5 UEs, gen. LE weakness  NEURO:   Cranial nerves 2-12 are normal tested and grossly at patient's baseline, speech clear  PSYCH:  insight and judgement impaired, memory impaired , affect abnormal -flat    Labs:     Most Recent 3 CBC's:  Recent Labs   Lab Test 02/17/20 01/07/20   WBC 7.8  --    HGB 11.2* 11.5*   MCV 98  --      --      Most Recent 3 BMP's:  Recent Labs   Lab Test 09/15/20 03/03/20 02/17/20    136 144   POTASSIUM 4.7 4.4 4.9   CHLORIDE 110* 105 110*   CO2 21* 24 25   BUN 40* 35* 37*   CR 1.34* 1.20* 1.47*   ANIONGAP 9 7 9   SLIME 9.5 9.1 9.2   GLC 63* 67* 87     Most Recent Urinalysis:  Recent Labs   Lab Test 03/10/20  1506   COLOR Yellow   APPEARANCE Clear   URINEGLC Negative   URINEBILI Negative   URINEKETONE Negative   SG 1.015   UBLD Trace*   URINEPH 5.0   PROTEIN Trace*   UROBILINOGEN 0.2   NITRITE Negative   LEUKEST Negative       ASSESSMENT/PLAN:  Cough  Ongoing, some MEDINA at times  1. Repeat CXR today  2. Start mucinex  3. Await CXR results, if neg for acute changes, consider prednisone taper for copd  4. Follow O2 sats, temp  5. covid swab today.  Did speak with son about POC shoulder resident test + for covid, may want to consider start of hospice cares for extra support    Non-intractable vomiting, presence of nausea not specified, unspecified vomiting type  Emesis x2 last pm. Decreased po intake  1. Cont. prilosec for GERD  2. Start prn zofran  3. Follow po intake  4. Cbc, bmp today    Weakness of both lower extremities  Some increased over past few days  1. Start HC RN visits  2. Encourage to sit up in chair as michael  3. Follow resp. Status as above  4. Check UA/UC to r/o UTI.  Cont. On prophylaxis macrobid, myrbetriq for recurrent UTIs      Electronically signed by:  JAYLAN Morales CNP               Sincerely,        JAYLAN Morales CNP

## 2020-11-19 NOTE — PROGRESS NOTES
[place] [facility]  [place] Medical Record Number: [id number]  [facility]  [employer] ([facility]) [[id number]]  Chief Complaint   Patient presents with     Cough     Vomiting       HPI:    Judith M Trierweiler is a 80 year old (1940), who is being seen today for an episodic care visit. HPI information obtained from: facility chart records, facility staff, patient report, [facility] chart review and family/first contact son report. Today's concern is: cough, emesis, weakness.  Has had ongoing cough.  CXR done. 11/10/20 neg for acute changes.  Noted to have some increased MEDINA past couple days,  O2 sats upper 80s at one time yesterday per staff.  Today O2 sats stable. Afebrile.  Emesis x 2 last pm. Also diarrhea.  Does have chronic diarrhea. Has prn imodium.  No diarrhea or emesis this am. Decreased po intake past 2 days.  Did have ST eval-did not appear to have dysphagia.  Amb. Short distances in room, o/w using w.c.       Past Medical and Surgical History reviewed in Epic today.    MEDICATIONS:      REVIEW OF SYSTEMS:  Unobtainable secondary to cognitive impairment. Reports ongoing cough, feeling tired    Objective:  /63   Pulse 58   Temp 97.1  F (36.2  C)   Resp 22   SpO2 92%   Exam:  GENERAL APPEARANCE:  Alert, in no distress, cooperative  ENT:  [place], no rhinitis  EYES:  EOM, conjunctivae, lids, pupils and irises normal, PERRL, no drainage  NECK:  No adenopathy,masses or thyromegaly, FROM  RESP:  no respiratory distress, n.p. cough present with deep inhalation. no wheezing or crackles heard.  noaccessory muscle use  CV:  Palpation and auscultation of heart done , regular rate and rhythm, no murmur, rub, or gallop, no edema  ABDOMEN:  normal bowel sounds, soft, nontender, no hepatosplenomegaly or other masses, no guarding or rebound  M/S:   muscle strength 5/5 UEs, gen. LE weakness  NEURO:   Cranial nerves 2-12 are normal tested and  grossly at patient's baseline, speech clear  PSYCH:  insight and judgement impaired, memory impaired , affect abnormal -flat    Labs:     Most Recent 3 CBC's:  Recent Labs   Lab Test 02/17/20 01/07/20   WBC 7.8  --    HGB 11.2* 11.5*   MCV 98  --      --      Most Recent 3 BMP's:  Recent Labs   Lab Test 09/15/20 03/03/20 02/17/20    136 144   POTASSIUM 4.7 4.4 4.9   CHLORIDE 110* 105 110*   CO2 21* 24 25   BUN 40* 35* 37*   CR 1.34* 1.20* 1.47*   ANIONGAP 9 7 9   SLIME 9.5 9.1 9.2   GLC 63* 67* 87     Most Recent Urinalysis:  Recent Labs   Lab Test 03/10/20  1506   COLOR Yellow   APPEARANCE Clear   URINEGLC Negative   URINEBILI Negative   URINEKETONE Negative   SG 1.015   UBLD Trace*   URINEPH 5.0   PROTEIN Trace*   UROBILINOGEN 0.2   NITRITE Negative   LEUKEST Negative       ASSESSMENT/PLAN:  Cough  Ongoing, some MEDINA at times  1. Repeat CXR today  2. Start mucinex  3. Await CXR results, if neg for acute changes, consider prednisone taper for copd  4. Follow O2 sats, temp  5. covid swab today.  Did speak with son about POC shoulder resident test + for covid, may want to consider start of hospice cares for extra support    Non-intractable vomiting, presence of nausea not specified, unspecified vomiting type  Emesis x2 last pm. Decreased po intake  1. Cont. prilosec for GERD  2. Start prn zofran  3. Follow po intake  4. Cbc, bmp today    Weakness of both lower extremities  Some increased over past few days  1. Start HC RN visits  2. Encourage to sit up in chair as michael  3. Follow resp. Status as above  4. Check UA/UC to r/o UTI.  Cont. On prophylaxis macrobid, myrbetriq for recurrent UTIs      Electronically signed by:  JAYLAN Morales CNP

## 2020-11-20 ENCOUNTER — TELEPHONE (OUTPATIENT)
Dept: GERIATRICS | Facility: CLINIC | Age: 80
End: 2020-11-20

## 2020-11-20 DIAGNOSIS — J98.8 RESPIRATORY TRACT INFECTION: Primary | ICD-10-CM

## 2020-11-20 LAB — BACTERIA SPEC CULT: NO GROWTH

## 2020-11-20 RX ORDER — AZITHROMYCIN 250 MG/1
TABLET, FILM COATED ORAL
Qty: 6 TABLET | Refills: 0 | Status: SHIPPED | OUTPATIENT
Start: 2020-11-20 | End: 2020-11-25

## 2020-11-24 ENCOUNTER — ASSISTED LIVING VISIT (OUTPATIENT)
Dept: GERIATRICS | Facility: CLINIC | Age: 80
End: 2020-11-24
Payer: COMMERCIAL

## 2020-11-24 VITALS
WEIGHT: 131 LBS | RESPIRATION RATE: 16 BRPM | BODY MASS INDEX: 26.46 KG/M2 | SYSTOLIC BLOOD PRESSURE: 109 MMHG | OXYGEN SATURATION: 94 % | TEMPERATURE: 97.8 F | HEART RATE: 74 BPM | DIASTOLIC BLOOD PRESSURE: 68 MMHG

## 2020-11-24 DIAGNOSIS — G30.1 LATE ONSET ALZHEIMER'S DISEASE WITHOUT BEHAVIORAL DISTURBANCE (H): ICD-10-CM

## 2020-11-24 DIAGNOSIS — F02.80 LATE ONSET ALZHEIMER'S DISEASE WITHOUT BEHAVIORAL DISTURBANCE (H): ICD-10-CM

## 2020-11-24 DIAGNOSIS — R29.898 WEAKNESS OF BOTH LOWER EXTREMITIES: ICD-10-CM

## 2020-11-24 DIAGNOSIS — J98.8 RESPIRATORY TRACT INFECTION: Primary | ICD-10-CM

## 2020-11-24 NOTE — PROGRESS NOTES
[place] [facility]  [place] Medical Record Number: [id number]  Place of Service where encounter took place: [facility] [employer]  [facility] ([facility]) [[id number]]  Chief Complaint   Patient presents with     Cough       HPI:    Judith M Trierweiler is a 80 year old (1940), who is being seen today for an episodic care visit. HPI information obtained from: facility chart records, facility staff, patient report, [facility] chart review and family/first contact son report. Today's concern is: pneumonia, weakness, alzheimer's.  Cont. On zpak.  O2 sats stable RA.  occ n.p. cough. Neg covid test last week. covid swab yesterday-results pend.  Reports decreased sense of taste.  Has had decreased po intake past 2 days. Hydrochlorothiazide, macrobid cont. On hold.  Last week Cr. Elevated above baseline.  Was to have labs drawn today-refused.  Some increased weakness of LEs. Is able to stand, transfer with staff assist. Has been on isolation precautions.  For alzheimer's cont. On celexa. Some agitation, frustration at times with isolation in apt. Spoke with son. Would consider hospice cares if covid +, or further decline in gen. Condition.  Has had some recent wt. Loss.       Past Medical and Surgical History reviewed in Epic today.    MEDICATIONS:      REVIEW OF SYSTEMS:  Unobtainable secondary to cognitive impairment. Reports feeling of, tired at times. No nausea    Objective:  /68   Pulse 74   Temp 97.8  F (36.6  C)   Resp 16   Wt 59.4 kg (131 lb)   SpO2 94%   BMI 26.46 kg/m    Exam:  GENERAL APPEARANCE:  Alert, in no distress, sl anxious at times  ENT:  Mouth and posterior oropharynx normal, moist mucous membranes, [place]  EYES:  EOM, conjunctivae, lids, pupils and irises normal, PERRL  NECK:  No adenopathy,masses or thyromegaly, FROM  RESP:  diminished breath sounds bibasilar. n.p. cough. no accessory muscle use  CV:  Palpation and auscultation of heart done , regular rate and rhythm, no murmur,  rub, or gallop, no edema  ABDOMEN:  normal bowel sounds, soft, nontender, no hepatosplenomegaly or other masses, no guarding or rebound  M/S:   muscle strength 5/5 UEs, some gen. LE weakness  NEURO:   Cranial nerves 2-12 are normal tested and grossly at patient's baseline, speec clear  PSYCH:  insight and judgement impaired, memory impaired , anxious    Labs:   Recent labs in [facility] reviewed by me today.     ASSESSMENT/PLAN:  Respiratory tract infection  Afebrile. O2 sats stable. Ongoing cough  1. Complete zpak  2. Encourage fluids. Cont. To hold hydrochlorothiazide, macrobid  3. Monitor for fever, follow O2 sats  4. Await covid results. If neg, discontinue isolation precautions    Weakness of both lower extremities  Likely r/t#1. Decrease in act. Level  1. Encourage to sit up more in chair during day  2. Cont. HC RN visits, once resp. Tract infection resolved, may consider PT for ongoing increased weakness  3. Monitor for attempts to self-transfer    Late onset Alzheimer's disease without behavioral disturbance (H)  Memory loss, some increased anxiety, agitation, exacerbated by isolation. UA/UC neg  1. Cont. celexa  2. Plan to discontinue isolation precautions if covid neg  3. Staff to cont. To encourage increased po intake      Electronically signed by:  JAYLAN Morales CNP

## 2020-11-24 NOTE — LETTER
11/24/2020        RE: Judith M Trierweiler [facility]  [address]  [address]  [facility] [address]        [place] [facility]  [place] Medical Record Number: [id number]  Place of Service where encounter took place: [employer] [employer] ([facility]) [[id number]]  Chief Complaint   Patient presents with     Cough       HPI:    Judith M Trierweiler is a 80 year old (1940), who is being seen today for an episodic care visit. HPI information obtained from: facility chart records, facility staff, patient report, [facility] chart review and family/first contact son report. Today's concern is: pneumonia, weakness, alzheimer's.  Cont. On zpak.  O2 sats stable RA.  occ n.p. cough. Neg covid test last week. covid swab yesterday-results pend.  Reports decreased sense of taste.  Has had decreased po intake past 2 days. Hydrochlorothiazide, macrobid cont. On hold.  Last week Cr. Elevated above baseline.  Was to have labs drawn today-refused.  Some increased weakness of LEs. Is able to stand, transfer with staff assist. Has been on isolation precautions.  For alzheimer's cont. On celexa. Some agitation, frustration at times with isolation in apt. Spoke with son. Would consider hospice cares if covid +, or further decline in gen. Condition.  Has had some recent wt. Loss.       Past Medical and Surgical History reviewed in Epic today.    MEDICATIONS:      REVIEW OF SYSTEMS:  Unobtainable secondary to cognitive impairment. Reports feeling of, tired at times. No nausea    Objective:  /68   Pulse 74   Temp 97.8  F (36.6  C)   Resp 16   Wt 59.4 kg (131 lb)   SpO2 94%   BMI 26.46 kg/m    Exam:  GENERAL APPEARANCE:  Alert, in no distress, sl anxious at times  ENT:  Mouth and posterior oropharynx normal, moist mucous membranes, [place]  EYES:  EOM, conjunctivae, lids, pupils and irises normal, PERRL  NECK:  No adenopathy,masses or thyromegaly, FROM  RESP:  diminished breath sounds bibasilar.  n.p. cough. no accessory muscle use  CV:  Palpation and auscultation of heart done , regular rate and rhythm, no murmur, rub, or gallop, no edema  ABDOMEN:  normal bowel sounds, soft, nontender, no hepatosplenomegaly or other masses, no guarding or rebound  M/S:   muscle strength 5/5 UEs, some gen. LE weakness  NEURO:   Cranial nerves 2-12 are normal tested and grossly at patient's baseline, speec clear  PSYCH:  insight and judgement impaired, memory impaired , anxious    Labs:   Recent labs in [facility] reviewed by me today.     ASSESSMENT/PLAN:  Respiratory tract infection  Afebrile. O2 sats stable. Ongoing cough  1. Complete zpak  2. Encourage fluids. Cont. To hold hydrochlorothiazide, macrobid  3. Monitor for fever, follow O2 sats  4. Await covid results. If neg, discontinue isolation precautions    Weakness of both lower extremities  Likely r/t#1. Decrease in act. Level  1. Encourage to sit up more in chair during day  2. Cont. HC RN visits, once resp. Tract infection resolved, may consider PT for ongoing increased weakness  3. Monitor for attempts to self-transfer    Late onset Alzheimer's disease without behavioral disturbance (H)  Memory loss, some increased anxiety, agitation, exacerbated by isolation. UA/UC neg  1. Cont. celexa  2. Plan to discontinue isolation precautions if covid neg  3. Staff to cont. To encourage increased po intake      Electronically signed by:  JAYLAN Morales CNP               Sincerely,        JAYLAN Morales CNP

## 2020-11-30 ENCOUNTER — ASSISTED LIVING VISIT (OUTPATIENT)
Dept: GERIATRICS | Facility: CLINIC | Age: 80
End: 2020-11-30
Payer: COMMERCIAL

## 2020-11-30 VITALS
OXYGEN SATURATION: 96 % | TEMPERATURE: 97.8 F | SYSTOLIC BLOOD PRESSURE: 101 MMHG | DIASTOLIC BLOOD PRESSURE: 58 MMHG | RESPIRATION RATE: 18 BRPM | HEART RATE: 62 BPM

## 2020-11-30 DIAGNOSIS — I12.9 BENIGN HYPERTENSION WITH CKD (CHRONIC KIDNEY DISEASE) STAGE III (H): ICD-10-CM

## 2020-11-30 DIAGNOSIS — N18.30 BENIGN HYPERTENSION WITH CKD (CHRONIC KIDNEY DISEASE) STAGE III (H): ICD-10-CM

## 2020-11-30 DIAGNOSIS — R13.10 DYSPHAGIA, UNSPECIFIED TYPE: ICD-10-CM

## 2020-11-30 DIAGNOSIS — F32.A DEPRESSION, UNSPECIFIED DEPRESSION TYPE: ICD-10-CM

## 2020-11-30 DIAGNOSIS — Z29.89 NEED FOR PROPHYLAXIS AGAINST URINARY TRACT INFECTION: ICD-10-CM

## 2020-11-30 DIAGNOSIS — I10 ESSENTIAL HYPERTENSION: ICD-10-CM

## 2020-11-30 DIAGNOSIS — E56.9 VITAMIN DEFICIENCY: ICD-10-CM

## 2020-11-30 DIAGNOSIS — J98.8 RESPIRATORY TRACT INFECTION: Primary | ICD-10-CM

## 2020-11-30 DIAGNOSIS — R52 PAIN: ICD-10-CM

## 2020-11-30 DIAGNOSIS — R05.9 COUGH: Primary | ICD-10-CM

## 2020-11-30 NOTE — LETTER
11/30/2020        RE: Judith M Trierweiler  [facility]  [address]  [address]  [facility] [address]        [place] [facility]  [place] Medical Record Number: [id number]  Place of Service where encounter took place: [facility] ([facility]) [[id number]]  Chief Complaint   Patient presents with     Cough       HPI:    Judith M Trierweiler is a 80 year old (1940), who is being seen today for an episodic care visit. HPI information obtained from: facility chart records, facility staff, patient report and [facility] chart review. Today's concern is: respiratory tract infection, HTN, dysphagia.  Completed zpak for resp. Tract infection.  Has been afebrile. O2 sats more stable.  Has ongoing occ cough. Cont. On mucinex. Has had hcyz, held due to elevated Cr. Level.  Per staff has had improved po intake, fluid intake past few days.  Is now taking meds-takes one at a time. Recent ST eval-no apparent dysphagia. BP sl lower today. Cont. On norvasc, lisinopril, metoprolol for HTN.  Refusing lab work at this time.       Past Medical and Surgical History reviewed in Epic today.    MEDICATIONS:      REVIEW OF SYSTEMS:  Unobtainable secondary to cognitive impairment. Reports occ cough. No sob today    Objective:  /58   Pulse 62   Temp 97.8  F (36.6  C)   Resp 18   SpO2 96%   Exam:  GENERAL APPEARANCE:  Alert, in no distress, cooperative  ENT:  Mouth and posterior oropharynx normal, moist mucous membranes, [place], no rhinitis  EYES:  EOM, conjunctivae, lids, pupils and irises normal, PERRL  RESP:  respiratory effort and palpation of chest normal, lungs clear to auscultation , diminished breath sounds bibasilar. n.p. cough  CV:  Palpation and auscultation of heart done , regular rate and rhythm, no murmur, rub, or gallop, no edema  ABDOMEN:  normal bowel sounds, soft, nontender, no hepatosplenomegaly or other masses, no guarding or rebound  M/S:   muscle strength 5/5 UEs,  gen. LE weakness  NEURO:   Cranial nerves 2-12 are normal tested and grossly at patient's baseline, speech clear  PSYCH:  insight and judgement impaired, memory impaired , affect and mood normal    Labs:     Most Recent 3 BMP's:  Recent Labs   Lab Test 09/15/20 03/03/20 02/17/20    136 144   POTASSIUM 4.7 4.4 4.9   CHLORIDE 110* 105 110*   CO2 21* 24 25   BUN 40* 35* 37*   CR 1.34* 1.20* 1.47*   ANIONGAP 9 7 9   SLIME 9.5 9.1 9.2   GLC 63* 67* 87       ASSESSMENT/PLAN:  Respiratory tract infection  Afebrile. O2 sats stable. RA. Ongoing n.p. cough. Completed zpak. Neg covid test last week  1. Cont. mucinex  2. Follow temp, O2 sats  3. For reports of sob, may start alb. MDI  4. reswab for covid per facility protocol  5. Encourage increased act. Level as michael  6. Encourage to come out of apt. Now that covid isolation precautions done    Benign hypertension with CKD (chronic kidney disease) stage III  Sl. Lower BP today.  1. Cont. Norvasc, lisinopril, metoprolol  2. Cont. To hold hydrochlorothiazide, macrobid for now  3. Reassess BPs over next wee  4. Will plan to discuss f/u lab work-currently refusing labs    Dysphagia, unspecified type  More compliant with taking meds past couple days. Improved po intake  1. Cont. Reg. Diet  2. Encourage po intake  3. Monitor for further difficulty taking pills-cont.to cruch prn      Electronically signed by:  JAYLAN Morales CNP               Sincerely,        JAYLAN Morales CNP

## 2020-11-30 NOTE — PROGRESS NOTES
[place] [facility]  [place] Medical Record Number: [id number]  Place of Service where encounter took place: [facility] ([facility]) [[id number]]  Chief Complaint   Patient presents with     Cough       HPI:    Judith M Trierweiler is a 80 year old (1940), who is being seen today for an episodic care visit. HPI information obtained from: facility chart records, facility staff, patient report and [facility] chart review. Today's concern is: respiratory tract infection, HTN, dysphagia.  Completed zpak for resp. Tract infection.  Has been afebrile. O2 sats more stable.  Has ongoing occ cough. Cont. On mucinex. Has had hcyz, held due to elevated Cr. Level.  Per staff has had improved po intake, fluid intake past few days.  Is now taking meds-takes one at a time. Recent ST eval-no apparent dysphagia. BP sl lower today. Cont. On norvasc, lisinopril, metoprolol for HTN.  Refusing lab work at this time.       Past Medical and Surgical History reviewed in Epic today.    MEDICATIONS:      REVIEW OF SYSTEMS:  Unobtainable secondary to cognitive impairment. Reports occ cough. No sob today    Objective:  /58   Pulse 62   Temp 97.8  F (36.6  C)   Resp 18   SpO2 96%   Exam:  GENERAL APPEARANCE:  Alert, in no distress, cooperative  ENT:  Mouth and posterior oropharynx normal, moist mucous membranes, [place], no rhinitis  EYES:  EOM, conjunctivae, lids, pupils and irises normal, PERRL  RESP:  respiratory effort and palpation of chest normal, lungs clear to auscultation , diminished breath sounds bibasilar. n.p. cough  CV:  Palpation and auscultation of heart done , regular rate and rhythm, no murmur, rub, or gallop, no edema  ABDOMEN:  normal bowel sounds, soft, nontender, no hepatosplenomegaly or other masses, no guarding or rebound  M/S:   muscle strength 5/5 UEs, gen. LE weakness  NEURO:   Cranial nerves 2-12 are normal tested and grossly at patient's baseline, speech clear  PSYCH:   insight and judgement impaired, memory impaired , affect and mood normal    Labs:     Most Recent 3 BMP's:  Recent Labs   Lab Test 09/15/20 03/03/20 02/17/20    136 144   POTASSIUM 4.7 4.4 4.9   CHLORIDE 110* 105 110*   CO2 21* 24 25   BUN 40* 35* 37*   CR 1.34* 1.20* 1.47*   ANIONGAP 9 7 9   SLIME 9.5 9.1 9.2   GLC 63* 67* 87       ASSESSMENT/PLAN:  Respiratory tract infection  Afebrile. O2 sats stable. RA. Ongoing n.p. cough. Completed zpak. Neg covid test last week  1. Cont. mucinex  2. Follow temp, O2 sats  3. For reports of sob, may start alb. MDI  4. reswab for covid per facility protocol  5. Encourage increased act. Level as michael  6. Encourage to come out of apt. Now that covid isolation precautions done    Benign hypertension with CKD (chronic kidney disease) stage III  Sl. Lower BP today.  1. Cont. Norvasc, lisinopril, metoprolol  2. Cont. To hold hydrochlorothiazide, macrobid for now  3. Reassess BPs over next wee  4. Will plan to discuss f/u lab work-currently refusing labs    Dysphagia, unspecified type  More compliant with taking meds past couple days. Improved po intake  1. Cont. Reg. Diet  2. Encourage po intake  3. Monitor for further difficulty taking pills-cont.to cruch prn      Electronically signed by:  JAYLAN Morales CNP

## 2020-12-01 RX ORDER — VIT A/VIT C/VIT E/ZINC/COPPER 4296-226
CAPSULE ORAL
Qty: 56 CAPSULE | Status: SHIPPED | OUTPATIENT
Start: 2020-12-01 | End: 2021-01-27

## 2020-12-01 RX ORDER — AMLODIPINE BESYLATE 5 MG/1
TABLET ORAL
Qty: 28 TABLET | Status: SHIPPED | OUTPATIENT
Start: 2020-12-01 | End: 2021-11-29

## 2020-12-01 RX ORDER — PSEUDOEPHED/ACETAMINOPH/DIPHEN 30MG-500MG
TABLET ORAL
Qty: 60 TABLET | Refills: 11 | Status: SHIPPED | OUTPATIENT
Start: 2020-12-01 | End: 2021-01-18

## 2020-12-01 RX ORDER — ASPIRIN 81 MG
TABLET,CHEWABLE ORAL
Qty: 28 TABLET | Status: SHIPPED | OUTPATIENT
Start: 2020-12-01 | End: 2021-11-29

## 2020-12-01 RX ORDER — HYDROCHLOROTHIAZIDE 12.5 MG/1
TABLET ORAL
Qty: 28 TABLET | Status: SHIPPED | OUTPATIENT
Start: 2020-12-01 | End: 2020-12-10

## 2020-12-01 RX ORDER — CHOLECALCIFEROL (VITAMIN D3) 25 MCG
TABLET ORAL
Qty: 56 TABLET | Status: SHIPPED | OUTPATIENT
Start: 2020-12-01 | End: 2021-11-29

## 2020-12-01 RX ORDER — LISINOPRIL 20 MG/1
TABLET ORAL
Qty: 28 TABLET | Status: SHIPPED | OUTPATIENT
Start: 2020-12-01 | End: 2021-11-29

## 2020-12-01 RX ORDER — NITROFURANTOIN 25; 75 MG/1; MG/1
CAPSULE ORAL
Qty: 28 CAPSULE | Status: SHIPPED | OUTPATIENT
Start: 2020-12-01 | End: 2021-05-24 | Stop reason: SINTOL

## 2020-12-01 RX ORDER — CITALOPRAM HYDROBROMIDE 10 MG/1
TABLET ORAL
Qty: 28 TABLET | Status: SHIPPED | OUTPATIENT
Start: 2020-12-01 | End: 2021-06-16

## 2020-12-10 ENCOUNTER — ASSISTED LIVING VISIT (OUTPATIENT)
Dept: GERIATRICS | Facility: CLINIC | Age: 80
End: 2020-12-10
Payer: COMMERCIAL

## 2020-12-10 VITALS
HEART RATE: 59 BPM | TEMPERATURE: 97.9 F | RESPIRATION RATE: 18 BRPM | SYSTOLIC BLOOD PRESSURE: 127 MMHG | OXYGEN SATURATION: 94 % | DIASTOLIC BLOOD PRESSURE: 58 MMHG

## 2020-12-10 DIAGNOSIS — J98.8 RESPIRATORY TRACT INFECTION: ICD-10-CM

## 2020-12-10 DIAGNOSIS — F03.90 DEMENTIA WITHOUT BEHAVIORAL DISTURBANCE, UNSPECIFIED DEMENTIA TYPE: ICD-10-CM

## 2020-12-10 DIAGNOSIS — I10 ESSENTIAL HYPERTENSION: Primary | ICD-10-CM

## 2020-12-10 NOTE — PROGRESS NOTES
[place] [facility]  [place] Medical Record Number: [id number]  Place of Service where encounter took place: [facility] [employer]  [facility] ([facility]) [[id number]]  Chief Complaint   Patient presents with     Hypertension       HPI:    Judith M Trierweiler is a 80 year old (1940), who is being seen today for an episodic care visit. HPI information obtained from: facility chart records, facility staff, patient report and [facility] chart review. Today's concern is: HTN, resp. Tract infection, dementia.  S/p resp. Tract infection, tx with zpak. Has occ ongoing cough. Afebrile. Neg covid test last week. O2 sats have been improved.  No recent sob. Has had recent lower BPs, decreased po intake. Hydrochlorothiazide held. Has CKD, most recent Cr. Elevated above baseline.  Refused f/u lab work. Had prophylaxis macrobid held while on zpak.  Cont. On norvasc, lisinopril, metoprolol.  BP stable. Today.  Since off covid precautions, has had improved po intake. Less agitation.for dementia cont. On celexa. Ongoing confusion at times per staff.       Past Medical and Surgical History reviewed in Epic today. Reports feeling fine today    MEDICATIONS:      REVIEW OF SYSTEMS:  Unobtainable secondary to cognitive impairment.     Objective:  /58   Pulse 59   Temp 97.9  F (36.6  C)   Resp 18   SpO2 94%   Exam:  GENERAL APPEARANCE:  Alert, in no distress, cooperative  ENT:  Mouth and posterior oropharynx normal, moist mucous membranes, [place]  EYES:  EOM, conjunctivae, lids, pupils and irises normal, PERRL  RESP:  respiratory effort and palpation of chest normal, lungs clear to auscultation , no respiratory distress  CV:  Palpation and auscultation of heart done , regular rate and rhythm, no murmur, rub, or gallop, peripheral edema trace+ in LEs  ABDOMEN:  normal bowel sounds, soft, nontender, no hepatosplenomegaly or other masses, no guarding or rebound  M/S:   muscle strength 5/5 UEs, some gen. LE  weakness  NEURO:   Cranial nerves 2-12 are normal tested and grossly at patient's baseline, speech clear  PSYCH:  insight and judgement impaired, memory impaired , affect and mood normal    Labs:     Most Recent 3 CBC's:  Recent Labs   Lab Test 02/17/20 01/07/20   WBC 7.8  --    HGB 11.2* 11.5*   MCV 98  --      --      Most Recent 3 BMP's:  Recent Labs   Lab Test 09/15/20 03/03/20 02/17/20    136 144   POTASSIUM 4.7 4.4 4.9   CHLORIDE 110* 105 110*   CO2 21* 24 25   BUN 40* 35* 37*   CR 1.34* 1.20* 1.47*   ANIONGAP 9 7 9   SLIME 9.5 9.1 9.2   GLC 63* 67* 87       ASSESSMENT/PLAN:  Essential hypertension  BP stable. Today. Recent low BPs, hydrochlorothiazide on hold  1. Discontinue hydrochlorothiazide  2. Cont. Metoprolol, norvasc, lisinopril  3. Follow BPs, HRs  4. Encourage fluids  5. Will again attempt lab work over next 1-2 mos    Respiratory tract infection  O2 sats stable, afebrile. S/p course of anbtx  1. Follow O2 sats  2. Cont. mucinex for now  3. Monitor for fever    Dementia without behavioral disturbance, unspecified dementia type (H)  Memory loss. More compliant with meds, improved po intake  1. Cont. celexa  2. Monitor fore further episodes of med refusal  3. Monitor for increased anxiety, delusions  4. Restart macrobid for UTI prophylaxis      Electronically signed by:  JAYLAN Morales CNP

## 2020-12-10 NOTE — LETTER
12/10/2020        RE: Judith M Trierweiler [facility]  [address]  [address]  [facility] [address]        [place] [facility]  [place] Medical Record Number: [id number]  Place of Service where encounter took place: [facility] ([facility]) [[id number]]  Chief Complaint   Patient presents with     Hypertension       HPI:    Judith M Trierweiler is a 80 year old (1940), who is being seen today for an episodic care visit. HPI information obtained from: facility chart records, facility staff, patient report and [facility] chart review. Today's concern is: HTN, resp. Tract infection, dementia.  S/p resp. Tract infection, tx with zpak. Has occ ongoing cough. Afebrile. Neg covid test last week. O2 sats have been improved.  No recent sob. Has had recent lower BPs, decreased po intake. Hydrochlorothiazide held. Has CKD, most recent Cr. Elevated above baseline.  Refused f/u lab work. Had prophylaxis macrobid held while on zpak.  Cont. On norvasc, lisinopril, metoprolol.  BP stable. Today.  Since off covid precautions, has had improved po intake. Less agitation.for dementia cont. On celexa. Ongoing confusion at times per staff.       Past Medical and Surgical History reviewed in [facility] today. Reports feeling fine today    MEDICATIONS:      REVIEW OF SYSTEMS:  Unobtainable secondary to cognitive impairment.     Objective:  /58   Pulse 59   Temp 97.9  F (36.6  C)   Resp 18   SpO2 94%   Exam:  GENERAL APPEARANCE:  Alert, in no distress, cooperative  ENT:  Mouth and posterior oropharynx normal, moist mucous membranes, [place]  EYES:  EOM, conjunctivae, lids, pupils and irises normal, PERRL  RESP:  respiratory effort and palpation of chest normal, lungs clear to auscultation , no respiratory distress  CV:  Palpation and auscultation of heart done , regular rate and rhythm, no murmur, rub, or gallop, peripheral edema trace+ in LEs  ABDOMEN:  normal bowel sounds, soft, nontender, no  hepatosplenomegaly or other masses, no guarding or rebound  M/S:   muscle strength 5/5 UEs, some gen. LE weakness  NEURO:   Cranial nerves 2-12 are normal tested and grossly at patient's baseline, speech clear  PSYCH:  insight and judgement impaired, memory impaired , affect and mood normal    Labs:     Most Recent 3 CBC's:  Recent Labs   Lab Test 02/17/20 01/07/20   WBC 7.8  --    HGB 11.2* 11.5*   MCV 98  --      --      Most Recent 3 BMP's:  Recent Labs   Lab Test 09/15/20 03/03/20 02/17/20    136 144   POTASSIUM 4.7 4.4 4.9   CHLORIDE 110* 105 110*   CO2 21* 24 25   BUN 40* 35* 37*   CR 1.34* 1.20* 1.47*   ANIONGAP 9 7 9   SLIME 9.5 9.1 9.2   GLC 63* 67* 87       ASSESSMENT/PLAN:  Essential hypertension  BP stable. Today. Recent low BPs, hydrochlorothiazide on hold  1. Discontinue hydrochlorothiazide  2. Cont. Metoprolol, norvasc, lisinopril  3. Follow BPs, HRs  4. Encourage fluids  5. Will again attempt lab work over next 1-2 mos    Respiratory tract infection  O2 sats stable, afebrile. S/p course of anbtx  1. Follow O2 sats  2. Cont. mucinex for now  3. Monitor for fever    Dementia without behavioral disturbance, unspecified dementia type (H)  Memory loss. More compliant with meds, improved po intake  1. Cont. celexa  2. Monitor fore further episodes of med refusal  3. Monitor for increased anxiety, delusions  4. Restart macrobid for UTI prophylaxis      Electronically signed by:  JAYLAN Morales CNP               Sincerely,        JAYLAN Morales CNP

## 2020-12-13 ENCOUNTER — TRANSFERRED RECORDS (OUTPATIENT)
Dept: HEALTH INFORMATION MANAGEMENT | Facility: CLINIC | Age: 80
End: 2020-12-13

## 2020-12-13 ENCOUNTER — RECORDS - HEALTHEAST (OUTPATIENT)
Dept: LAB | Facility: CLINIC | Age: 80
End: 2020-12-13

## 2020-12-13 LAB
ALBUMIN UR-MCNC: ABNORMAL MG/DL
APPEARANCE UR: CLEAR
BACTERIA #/AREA URNS HPF: ABNORMAL HPF
BILIRUB UR QL STRIP: NEGATIVE
COLOR UR AUTO: YELLOW
GLUCOSE UR STRIP-MCNC: NEGATIVE MG/DL
HGB UR QL STRIP: NEGATIVE
HYALINE CASTS #/AREA URNS LPF: ABNORMAL LPF
KETONES UR STRIP-MCNC: NEGATIVE MG/DL
LEUKOCYTE ESTERASE UR QL STRIP: ABNORMAL
NITRATE UR QL: NEGATIVE
PH UR STRIP: 5 [PH] (ref 4.5–8)
RBC #/AREA URNS AUTO: ABNORMAL HPF
SP GR UR STRIP: 1.02 (ref 1–1.03)
SQUAMOUS #/AREA URNS AUTO: ABNORMAL LPF
UROBILINOGEN UR STRIP-ACNC: ABNORMAL
WBC #/AREA URNS AUTO: ABNORMAL HPF

## 2020-12-14 LAB — BACTERIA SPEC CULT: NORMAL

## 2020-12-15 ENCOUNTER — ASSISTED LIVING VISIT (OUTPATIENT)
Dept: GERIATRICS | Facility: CLINIC | Age: 80
End: 2020-12-15
Payer: COMMERCIAL

## 2020-12-15 VITALS
TEMPERATURE: 97.7 F | SYSTOLIC BLOOD PRESSURE: 139 MMHG | OXYGEN SATURATION: 93 % | RESPIRATION RATE: 16 BRPM | DIASTOLIC BLOOD PRESSURE: 78 MMHG | HEART RATE: 64 BPM

## 2020-12-15 DIAGNOSIS — R05.9 COUGH: ICD-10-CM

## 2020-12-15 DIAGNOSIS — R29.6 RECURRENT FALLS: Primary | ICD-10-CM

## 2020-12-15 DIAGNOSIS — I10 ESSENTIAL HYPERTENSION: ICD-10-CM

## 2020-12-15 RX ORDER — LOPERAMIDE HCL 2 MG
2 CAPSULE ORAL 2 TIMES DAILY PRN
COMMUNITY
End: 2021-04-29

## 2020-12-15 NOTE — LETTER
12/15/2020        RE: Judith M Trierweiler [facility]  [address] [place]  [address]  [facility] [address]        [place] [facility]  [place] Medical Record Number: [id number]  Place of Service where encounter took place: [facility]  [employer] ([facility]) [[id number]]  Chief Complaint   Patient presents with     Fall     Nausea       HPI:    Judith M Trierweiler is a 80 year old (1940), who is being seen today for an episodic care visit. HPI information obtained from: facility chart records, facility staff, patient report and [facility] chart review. Today's concern is: falls, cough, HTN.  S/p resp. Tract infection, tx with zpak.  Cough has improved. O2 sats stable. Remains afebrile. Has had gen. Weakness. Decreased amb. Falls x 2 yesterday with attempts to self-transfer.  No apparent injuries.  Did have some nausea yesterday. No nausea today, po intake stable. Neuros intact. Had recent decreased po intake. Hydrochlorothiazide dc'd due to decrease in fluid intake.  Cont. On norvasc, lopressor, lisinopril.  Has CKD.  Has refused recent lab work. Is to have bmp done today.  BPs gen. Stable.       Past Medical and Surgical History reviewed in Epic today.    MEDICATIONS:      REVIEW OF SYSTEMS:  Unobtainable secondary to cognitive impairment. Reports feeling fine today    Objective:  /78   Pulse 64   Temp 97.7  F (36.5  C)   Resp 16   SpO2 93%   Exam:  GENERAL APPEARANCE:  Alert, in no distress, cooperative  ENT:  Mouth and posterior oropharynx normal, moist mucous membranes, [place]  EYES:  EOM, conjunctivae, lids, pupils and irises normal, PERRL  NECK:  No adenopathy,masses or thyromegaly, FROM  RESP:  respiratory effort and palpation of chest normal, lungs clear to auscultation , no respiratory distress, diminished breath sounds bibasilar  CV:  Palpation and auscultation of heart done , regular rate and rhythm, no murmur, rub, or gallop, no edema  ABDOMEN:  normal bowel sounds,  soft, nontender, no hepatosplenomegaly or other masses, no guarding or rebound  M/S:   muscle strength 5/5 UEs, gen. LE weakness. no pain with PROM extremities  NEURO:   Cranial nerves 2-12 are normal tested and grossly at patient's baseline, speech clear  PSYCH:  insight and judgement impaired, memory impaired , affect and mood normal    Labs:     Most Recent 3 CBC's:  Recent Labs   Lab Test 02/17/20 01/07/20   WBC 7.8  --    HGB 11.2* 11.5*   MCV 98  --      --      Most Recent 3 BMP's:  Recent Labs   Lab Test 09/15/20 03/03/20 02/17/20    136 144   POTASSIUM 4.7 4.4 4.9   CHLORIDE 110* 105 110*   CO2 21* 24 25   BUN 40* 35* 37*   CR 1.34* 1.20* 1.47*   ANIONGAP 9 7 9   SLIME 9.5 9.1 9.2   GLC 63* 67* 87       ASSESSMENT/PLAN:  Recurrent falls  No apparent inj.  Attempts to self-transfer. Had recent increased weakness due to resp. Tract infection, strength more baseline  1. Cont. Walker for all transfers  2. Cont. Staff assist with all transfers  3. Cont. Tylenol, aspercreme for pain  4. Monitor for further attempts to self-transfer, falls    Cough  Improved.  Completed zpak  1. Follow O2 sats, temps  2. Cont. mucinex for now  3. Encourage increased act. Level as michael  4. For wheezing consider alb. MDI    Essential hypertension  BPs currently stable. Recent discontinue of hydrochlorothiazide due to lower BPs, decrease in fluid intake  1. Cont. Norvasc, lisinopril, lopressor  2. Encourage fluids  3. Await bmp results, for Cr. Elevated above baseline may need to decrease lisinopril dose      Electronically signed by:  JAYLAN Morales CNP               Sincerely,        JAYLAN Morales CNP

## 2020-12-15 NOTE — PROGRESS NOTES
[place] [facility]  [place] Medical Record Number: [id number]  Place of Service where encounter took place: [facility] [employer] [facility]  [facility] ([facility]) [[id number]]  Chief Complaint   Patient presents with     Fall     Nausea       HPI:    Judith M Trierweiler is a 80 year old (1940), who is being seen today for an episodic care visit. HPI information obtained from: facility chart records, facility staff, patient report and [facility] chart review. Today's concern is: falls, cough, HTN.  S/p resp. Tract infection, tx with zpak.  Cough has improved. O2 sats stable. Remains afebrile. Has had gen. Weakness. Decreased amb. Falls x 2 yesterday with attempts to self-transfer.  No apparent injuries.  Did have some nausea yesterday. No nausea today, po intake stable. Neuros intact. Had recent decreased po intake. Hydrochlorothiazide dc'd due to decrease in fluid intake.  Cont. On norvasc, lopressor, lisinopril.  Has CKD.  Has refused recent lab work. Is to have bmp done today.  BPs gen. Stable.       Past Medical and Surgical History reviewed in Epic today.    MEDICATIONS:      REVIEW OF SYSTEMS:  Unobtainable secondary to cognitive impairment. Reports feeling fine today    Objective:  /78   Pulse 64   Temp 97.7  F (36.5  C)   Resp 16   SpO2 93%   Exam:  GENERAL APPEARANCE:  Alert, in no distress, cooperative  ENT:  Mouth and posterior oropharynx normal, moist mucous membranes, [place]  EYES:  EOM, conjunctivae, lids, pupils and irises normal, PERRL  NECK:  No adenopathy,masses or thyromegaly, FROM  RESP:  respiratory effort and palpation of chest normal, lungs clear to auscultation , no respiratory distress, diminished breath sounds bibasilar  CV:  Palpation and auscultation of heart done , regular rate and rhythm, no murmur, rub, or gallop, no edema  ABDOMEN:  normal bowel sounds, soft, nontender, no hepatosplenomegaly or other masses, no guarding or rebound  M/S:   muscle strength 5/5 UEs, gen. LE  weakness. no pain with PROM extremities  NEURO:   Cranial nerves 2-12 are normal tested and grossly at patient's baseline, speech clear  PSYCH:  insight and judgement impaired, memory impaired , affect and mood normal    Labs:     Most Recent 3 CBC's:  Recent Labs   Lab Test 02/17/20 01/07/20   WBC 7.8  --    HGB 11.2* 11.5*   MCV 98  --      --      Most Recent 3 BMP's:  Recent Labs   Lab Test 09/15/20 03/03/20 02/17/20    136 144   POTASSIUM 4.7 4.4 4.9   CHLORIDE 110* 105 110*   CO2 21* 24 25   BUN 40* 35* 37*   CR 1.34* 1.20* 1.47*   ANIONGAP 9 7 9   SLIME 9.5 9.1 9.2   GLC 63* 67* 87       ASSESSMENT/PLAN:  Recurrent falls  No apparent inj.  Attempts to self-transfer. Had recent increased weakness due to resp. Tract infection, strength more baseline  1. Cont. Walker for all transfers  2. Cont. Staff assist with all transfers  3. Cont. Tylenol, aspercreme for pain  4. Monitor for further attempts to self-transfer, falls    Cough  Improved.  Completed zpak  1. Follow O2 sats, temps  2. Cont. mucinex for now  3. Encourage increased act. Level as michael  4. For wheezing consider alb. MDI    Essential hypertension  BPs currently stable. Recent discontinue of hydrochlorothiazide due to lower BPs, decrease in fluid intake  1. Cont. Norvasc, lisinopril, lopressor  2. Encourage fluids  3. Await bmp results, for Cr. Elevated above baseline may need to decrease lisinopril dose      Electronically signed by:  JAYLAN Morales CNP

## 2021-01-04 ENCOUNTER — ASSISTED LIVING VISIT (OUTPATIENT)
Dept: GERIATRICS | Facility: CLINIC | Age: 81
End: 2021-01-04
Payer: COMMERCIAL

## 2021-01-04 VITALS
TEMPERATURE: 98.2 F | RESPIRATION RATE: 18 BRPM | SYSTOLIC BLOOD PRESSURE: 120 MMHG | OXYGEN SATURATION: 95 % | DIASTOLIC BLOOD PRESSURE: 67 MMHG | HEART RATE: 89 BPM

## 2021-01-04 DIAGNOSIS — M62.81 GENERALIZED MUSCLE WEAKNESS: ICD-10-CM

## 2021-01-04 DIAGNOSIS — R05.9 COUGH: Primary | ICD-10-CM

## 2021-01-04 DIAGNOSIS — I10 ESSENTIAL HYPERTENSION: ICD-10-CM

## 2021-01-04 NOTE — PROGRESS NOTES
[place] [facility]  [place] Medical Record Number: [id number]  Place of Service where encounter took place: [facility] [employer]  [facility] ([facility]) [[id number]]  Chief Complaint   Patient presents with     Cough     Hypertension       HPI:    Judith M Trierweiler is a 80 year old (1940), who is being seen today for an episodic care visit. HPI information obtained from: facility chart records, facility staff, patient report and [facility] chart review. Today's concern is: cough, HTN, weakness.  Has had ongoing cough.  Had zpak, cont on mucinex. O2 sats stable, afebrile.  Has some diff. Swallowing pills, o/w no reports of dysphagia with meals. Has had ongoing gen. Weakness. No longer amb.  Requires assist with transfers.  No falls past couple weeks.  BPs stable.  Recent discontinue of hydrochlorothiazide due to decrease in po intake, lower BPs.  HR 90s today.       Past Medical and Surgical History reviewed in Epic today.    MEDICATIONS:      REVIEW OF SYSTEMS:  Unobtainable secondary to cognitive impairment. Reports ongoing cough, otherwise feeling ok today    Objective:  /67   Pulse 89   Temp 98.2  F (36.8  C)   Resp 18   SpO2 95%   Exam:  GENERAL APPEARANCE:  Alert, in no distress, cooperative  ENT:  Mouth and posterior oropharynx normal, moist mucous membranes, [place]  EYES:  EOM, conjunctivae, lids, pupils and irises normal, PERRL  RESP:  respiratory effort and palpation of chest normal, lungs clear to auscultation , no respiratory distress, diminished breath sounds bibasilar. cough present  CV:  Palpation and auscultation of heart done , regular rate and rhythm, no murmur, rub, or gallop, no edema  ABDOMEN:  normal bowel sounds, soft, nontender, no hepatosplenomegaly or other masses, no guarding or rebound  M/S:   muscle strength 5/5 UEs, gen. LE weakness  NEURO:   Cranial nerves 2-12 are normal tested and grossly at patient's baseline, speech clear  PSYCH:  insight and judgement  impaired, memory impaired , affect and mood normal    Labs:   Recent labs in [facility] reviewed by me today.     ASSESSMENT/PLAN:  Cough  Ongoing. Afebrile. O2 sats stable. Recent covid test neg  1. Cont. mucinex  2. Follow temp, O2 sats  3. Recheck, swab for covid this week per facility protocol  4. For fever, worsening cough, decreased O2 sats, may repeat CXR    Essential hypertension  BP stable. HR 90s today. Hydrochlorothiazide dc'd  1. Cont. Lisinopril, norvasc, lopressor  2. Follow HRs, BPs  3. Encourage fluids  4. Bmp in next 1-3 mos    Generalized muscle weakness  Ongoing. Sleeps in in ams per staff, decreased act. Level. Non-amb  1. Cont. To encourage to sit up during the day  2. Cont. To take to dining room for meals  3. Monitor for attempts to self-transfer, falls      Electronically signed by:  JAYLAN Morales CNP

## 2021-01-04 NOTE — LETTER
1/4/2021        RE: Judith M Trierweiler [facility]  [address]  [address]  [facility] [address]        [facility]  [place] Medical Record Number: [id number]  Place of Service where encounter took place: [facility]  [employer] ([facility]) [[id number]]  Chief Complaint   Patient presents with     Cough     Hypertension       HPI:    Judith M Trierweiler is a 80 year old (1940), who is being seen today for an episodic care visit. HPI information obtained from: facility chart records, facility staff, patient report and [facility] chart review. Today's concern is: cough, HTN, weakness.  Has had ongoing cough.  Had zpak, cont on mucinex. O2 sats stable, afebrile.  Has some diff. Swallowing pills, o/w no reports of dysphagia with meals. Has had ongoing gen. Weakness. No longer amb.  Requires assist with transfers.  No falls past couple weeks.  BPs stable.  Recent discontinue of hydrochlorothiazide due to decrease in po intake, lower BPs.  HR 90s today.       Past Medical and Surgical History reviewed in Epic today.    MEDICATIONS:      REVIEW OF SYSTEMS:  Unobtainable secondary to cognitive impairment. Reports ongoing cough, otherwise feeling ok today    Objective:  /67   Pulse 89   Temp 98.2  F (36.8  C)   Resp 18   SpO2 95%   Exam:  GENERAL APPEARANCE:  Alert, in no distress, cooperative  ENT:  Mouth and posterior oropharynx normal, moist mucous membranes, [place]  EYES:  EOM, conjunctivae, lids, pupils and irises normal, PERRL  RESP:  respiratory effort and palpation of chest normal, lungs clear to auscultation , no respiratory distress, diminished breath sounds bibasilar. cough present  CV:  Palpation and auscultation of heart done , regular rate and rhythm, no murmur, rub, or gallop, no edema  ABDOMEN:  normal bowel sounds, soft, nontender, no hepatosplenomegaly or other masses, no guarding or rebound  M/S:   muscle strength 5/5 UEs, gen. LE weakness  NEURO:    Cranial nerves 2-12 are normal tested and grossly at patient's baseline, speech clear  PSYCH:  insight and judgement impaired, memory impaired , affect and mood normal    Labs:   Recent labs in [place] reviewed by me today.     ASSESSMENT/PLAN:  Cough  Ongoing. Afebrile. O2 sats stable. Recent covid test neg  1. Cont. mucinex  2. Follow temp, O2 sats  3. Recheck, swab for covid this week per facility protocol  4. For fever, worsening cough, decreased O2 sats, may repeat CXR    Essential hypertension  BP stable. HR 90s today. Hydrochlorothiazide dc'd  1. Cont. Lisinopril, norvasc, lopressor  2. Follow HRs, BPs  3. Encourage fluids  4. Bmp in next 1-3 mos    Generalized muscle weakness  Ongoing. Sleeps in in ams per staff, decreased act. Level. Non-amb  1. Cont. To encourage to sit up during the day  2. Cont. To take to dining room for meals  3. Monitor for attempts to self-transfer, falls      Electronically signed by:  JAYLAN Morales CNP               Sincerely,        JAYLAN Morales CNP

## 2021-01-07 ENCOUNTER — ASSISTED LIVING VISIT (OUTPATIENT)
Dept: GERIATRICS | Facility: CLINIC | Age: 81
End: 2021-01-07
Payer: COMMERCIAL

## 2021-01-07 VITALS
SYSTOLIC BLOOD PRESSURE: 115 MMHG | TEMPERATURE: 97.9 F | OXYGEN SATURATION: 90 % | HEART RATE: 67 BPM | BODY MASS INDEX: 26.46 KG/M2 | DIASTOLIC BLOOD PRESSURE: 59 MMHG | WEIGHT: 131 LBS

## 2021-01-07 DIAGNOSIS — M62.81 GENERALIZED MUSCLE WEAKNESS: ICD-10-CM

## 2021-01-07 DIAGNOSIS — F02.80 LATE ONSET ALZHEIMER'S DISEASE WITHOUT BEHAVIORAL DISTURBANCE (H): ICD-10-CM

## 2021-01-07 DIAGNOSIS — R05.9 COUGH: Primary | ICD-10-CM

## 2021-01-07 DIAGNOSIS — G30.1 LATE ONSET ALZHEIMER'S DISEASE WITHOUT BEHAVIORAL DISTURBANCE (H): ICD-10-CM

## 2021-01-07 DIAGNOSIS — N18.32 STAGE 3B CHRONIC KIDNEY DISEASE (H): ICD-10-CM

## 2021-01-07 DIAGNOSIS — F32.A DEPRESSION, UNSPECIFIED DEPRESSION TYPE: ICD-10-CM

## 2021-01-07 DIAGNOSIS — I10 ESSENTIAL HYPERTENSION: ICD-10-CM

## 2021-01-07 DIAGNOSIS — R29.6 RECURRENT FALLS: ICD-10-CM

## 2021-01-07 NOTE — LETTER
1/7/2021        RE: Judith M Trierweiler  [facility]  [address]  [address]  [facility] [address]        Judith M Trierweiler is a 80 year old female seen January 7, 2021 at [facility] where she has resided for one year (admit 12/2019)   Pt is seen in her room, has returned to bed before lunch.     Reports some fatigue and malaise.  She has had chronic cough past several months, some MEDINA but no hypoxia.  CXR unremarkable x2.     Had a course of abx and Mucinex in November.   Now some clear dysphagia, needing her pills crushed.   Has seen [facility].   COVID testing has remained negative.           She has carried a dx of dementia since 2016, last seen by Neurology in October 2019 specifically to address possibility of NPH as noted on MRI:  Neurologist thought this dx unlikely.     Decline in mobility has been present over the past couple of years, has had TCU stays and [facility]   No longer ambulatory  Pt has had multiple hospitalizations in the past year including for a bleeding ulcer, and again for right LE DVT (records not available on Care Everywhere)    She is on 4 medications for HTN and has secondary CKD3   Hydrochlorothiazide discontinued secondary to decreased po intake.  Pt refuses lab draws.      Pt saw Urology 3/2020 and had workup for recurrent UTIs and urinary incontinence that included urodynamics and imaging.  She has very weak detrusor contraction and incomplete bladder emptying.       Past Medical History:   Diagnosis Date     Arthritis      Cataract      Cerebral infarction (H)      CKD (chronic kidney disease)      Essential hypertension, benign      Falls frequently      Gastroesophageal reflux disease      Gout      Hemorrhoid      History of duodenal ulcer      History of DVT (deep vein thrombosis)      History of GI bleed      Hyperlipidemia      Macular degeneration      Psoriasis      Vitreous hemorrhage (H)      Past Surgical History:   Procedure  "Laterality Date     CATARACT       Z NONSPECIFIC PROCEDURE      Hysterectomy \"pre-cancer\"     Z NONSPECIFIC PROCEDURE      cholecystectomy     SH:   Mo still lives in their home in [facility]    Son Edgar lives in [facility], also has a daughter Jessica.     ROS: ambulatory with FWW  Wt Readings from Last 5 Encounters:   01/07/21 59.4 kg (131 lb)   11/24/20 59.4 kg (131 lb)   03/10/20 62.1 kg (137 lb)   02/05/20 62.1 kg (137 lb)   01/13/20 62.6 kg (138 lb)      EXAM:  NAD  /59   Pulse 67   Temp 97.9  F (36.6  C)   Wt 59.4 kg (131 lb)   SpO2 90%   BMI 26.46 kg/m     Neck supple without adenopathy  Lungs with decreased BS, fine rales bilaterally  Heart RRR s1s2 @85  Jose Raul soft, NT, no distention, +BS  Ext without edema.   Neuro: no focal deficits, speech normal, +STML and disorientation  Psych: affect flat    Last Comprehensive Metabolic Panel:  Sodium   Date Value Ref Range Status   09/15/2020 140 136 - 145 mmol/L Final     Potassium   Date Value Ref Range Status   09/15/2020 4.7 3.5 - 5.0 mmol/L Final     Chloride   Date Value Ref Range Status   09/15/2020 110 (A) 98 - 107 mmol/L Final     Carbon Dioxide   Date Value Ref Range Status   09/15/2020 21 (A) 22 - 31 mmol/L Final     Anion Gap   Date Value Ref Range Status   09/15/2020 9 5 - 18 mmol/L Final     Glucose   Date Value Ref Range Status   09/15/2020 63 (A) 70 - 125 mg/dL Final     Urea Nitrogen   Date Value Ref Range Status   09/15/2020 40 (A) 8 - 28 mg/dL Final     Creatinine   Date Value Ref Range Status   09/15/2020 1.34 (A) 0.60 - 1.10 mg/dL Final     GFR Estimate   Date Value Ref Range Status   09/15/2020 38 (A) >60 ml/min/1.73m2 Final     Calcium   Date Value Ref Range Status   09/15/2020 9.5 8.5 - 10.5 mg/dL Final       IMP/PLAN:   (R05) Cough    Comment: chronic, likely related to dysphgia    Plan: modified diet, assist as needed, crush meds.       (N18.32) Stage 3b chronic kidney disease  (I12.9,  N18.3) Benign hypertension with CKD " (chronic kidney disease) stage III (H)    Comment:   BP Readings from Last 3 Encounters:   01/07/21 115/59   01/04/21 120/67   12/15/20 139/78       Plan: metoprolol 25 mg bid, amlodipine 5 mg/day, lisinopril 20 mg /day    Recheck BMP if patient agrees; if GFR still low and bps <120 would decrease lisinopril dose.       (G30.1,  F02.80) Late onset Alzheimer's disease without behavioral disturbance (H)  Comment: gradual decline in cognition, language and mobility with low functional status   Plan: AL support for meds, meals, activity    (K27.9) PUD (peptic ulcer disease)  Comment: no recent bleed, hgb stable    Plan: decrease omeprazole to 10 mg/day and follow.       (R29.6) Recurrent falls  Comment: LE weakness, poor safety awareness, no longer ambulatory   Plan: AL staff assists with mobility.     (N39.0) Recurrent UTI  Comment: as above    Worry about ongoing nitrofurantoin tx given abnormal exam and symptoms today    Plan: discontinue prophylactic nitrofurantoin at this time, follow.       (F32.9) Depression, unspecified depression type  Comment: by history   Plan: continue citalopram 10 mg/day       (M15.0) Primary osteoarthritis involving multiple joints  Comment: decreased mobility   Plan: scheduled acetaminophen, ambulation with FWW     Vale Lawrence MD         Sincerely,        Vale Lawrence MD

## 2021-01-14 PROBLEM — N18.30 CHRONIC KIDNEY DISEASE, STAGE 3 (H): Status: ACTIVE | Noted: 2021-01-14

## 2021-01-15 NOTE — PROGRESS NOTES
"Judith M Trierweiler is a 80 year old female seen January 7, 2021 at [facility] where she has resided for one year (admit 12/2019)   Pt is seen in her room, has returned to bed before lunch.     Reports some fatigue and malaise.  She has had chronic cough past several months, some MEDINA but no hypoxia.  CXR unremarkable x2.     Had a course of abx and Mucinex in November.   Now some clear dysphagia, needing her pills crushed.   Has seen [facility].   COVID testing has remained negative.           She has carried a dx of dementia since 2016, last seen by Neurology in October 2019 specifically to address possibility of NPH as noted on MRI:  Neurologist thought this dx unlikely.     Decline in mobility has been present over the past couple of years, has had TCU stays and [facility]YS[facility]AP[facility]   No longer ambulatory  Pt has had multiple hospitalizations in the past year including for a bleeding ulcer, and again for right LE DVT (records not available on Care Everywhere)    She is on 4 medications for HTN and has secondary CKD3   Hydrochlorothiazide discontinued secondary to decreased po intake.  Pt refuses lab draws.      Pt saw Urology 3/2020 and had workup for recurrent UTIs and urinary incontinence that included urodynamics and imaging.  She has very weak detrusor contraction and incomplete bladder emptying.       Past Medical History:   Diagnosis Date     Arthritis      Cataract      Cerebral infarction (H)      CKD (chronic kidney disease)      Essential hypertension, benign      Falls frequently      Gastroesophageal reflux disease      Gout      Hemorrhoid      History of duodenal ulcer      History of DVT (deep vein thrombosis)      History of GI bleed      Hyperlipidemia      Macular degeneration      Psoriasis      Vitreous hemorrhage (H)      Past Surgical History:   Procedure Laterality Date     CATARACT       [facility] NONSPECIFIC PROCEDURE      Hysterectomy \"pre-cancer\"     Z NONSPECIFIC PROCEDURE      " cholecystectomy     SH:   Mo still lives in their home in [facility]    Son Edgar lives in [facility], also has a daughter Jessica.     ROS: ambulatory with FWW  Wt Readings from Last 5 Encounters:   01/07/21 59.4 kg (131 lb)   11/24/20 59.4 kg (131 lb)   03/10/20 62.1 kg (137 lb)   02/05/20 62.1 kg (137 lb)   01/13/20 62.6 kg (138 lb)      EXAM:  NAD  /59   Pulse 67   Temp 97.9  F (36.6  C)   Wt 59.4 kg (131 lb)   SpO2 90%   BMI 26.46 kg/m     Neck supple without adenopathy  Lungs with decreased BS, fine rales bilaterally  Heart RRR s1s2 @85  Jose Raul soft, NT, no distention, +BS  Ext without edema.   Neuro: no focal deficits, speech normal, +STML and disorientation  Psych: affect flat    Last Comprehensive Metabolic Panel:  Sodium   Date Value Ref Range Status   09/15/2020 140 136 - 145 mmol/L Final     Potassium   Date Value Ref Range Status   09/15/2020 4.7 3.5 - 5.0 mmol/L Final     Chloride   Date Value Ref Range Status   09/15/2020 110 (A) 98 - 107 mmol/L Final     Carbon Dioxide   Date Value Ref Range Status   09/15/2020 21 (A) 22 - 31 mmol/L Final     Anion Gap   Date Value Ref Range Status   09/15/2020 9 5 - 18 mmol/L Final     Glucose   Date Value Ref Range Status   09/15/2020 63 (A) 70 - 125 mg/dL Final     Urea Nitrogen   Date Value Ref Range Status   09/15/2020 40 (A) 8 - 28 mg/dL Final     Creatinine   Date Value Ref Range Status   09/15/2020 1.34 (A) 0.60 - 1.10 mg/dL Final     GFR Estimate   Date Value Ref Range Status   09/15/2020 38 (A) >60 ml/min/1.73m2 Final     Calcium   Date Value Ref Range Status   09/15/2020 9.5 8.5 - 10.5 mg/dL Final       IMP/PLAN:   (R05) Cough    Comment: chronic, likely related to dysphgia    Plan: modified diet, assist as needed, crush meds.       (N18.32) Stage 3b chronic kidney disease  (I12.9,  N18.3) Benign hypertension with CKD (chronic kidney disease) stage III (H)    Comment:   BP Readings from Last 3 Encounters:   01/07/21 115/59   01/04/21 120/67    12/15/20 139/78       Plan: metoprolol 25 mg bid, amlodipine 5 mg/day, lisinopril 20 mg /day    Recheck BMP if patient agrees; if GFR still low and bps <120 would decrease lisinopril dose.       (G30.1,  F02.80) Late onset Alzheimer's disease without behavioral disturbance (H)  Comment: gradual decline in cognition, language and mobility with low functional status   Plan: AL support for meds, meals, activity    (K27.9) PUD (peptic ulcer disease)  Comment: no recent bleed, hgb stable    Plan: decrease omeprazole to 10 mg/day and follow.       (R29.6) Recurrent falls  Comment: LE weakness, poor safety awareness, no longer ambulatory   Plan: AL staff assists with mobility.     (N39.0) Recurrent UTI  Comment: as above    Worry about ongoing nitrofurantoin tx given abnormal exam and symptoms today    Plan: discontinue prophylactic nitrofurantoin at this time, follow.       (F32.9) Depression, unspecified depression type  Comment: by history   Plan: continue citalopram 10 mg/day       (M15.0) Primary osteoarthritis involving multiple joints  Comment: decreased mobility   Plan: scheduled acetaminophen, ambulation with GUY Lawrence MD

## 2021-01-17 DIAGNOSIS — R52 PAIN: ICD-10-CM

## 2021-01-18 RX ORDER — PSEUDOEPHED/ACETAMINOPH/DIPHEN 30MG-500MG
TABLET ORAL
Qty: 112 TABLET | Refills: 97 | Status: SHIPPED | OUTPATIENT
Start: 2021-01-18 | End: 2021-01-27

## 2021-01-27 ENCOUNTER — ASSISTED LIVING VISIT (OUTPATIENT)
Dept: GERIATRICS | Facility: CLINIC | Age: 81
End: 2021-01-27
Payer: COMMERCIAL

## 2021-01-27 VITALS
HEART RATE: 60 BPM | SYSTOLIC BLOOD PRESSURE: 117 MMHG | RESPIRATION RATE: 18 BRPM | OXYGEN SATURATION: 97 % | DIASTOLIC BLOOD PRESSURE: 47 MMHG

## 2021-01-27 DIAGNOSIS — R05.9 COUGH: ICD-10-CM

## 2021-01-27 DIAGNOSIS — R52 PAIN: Primary | ICD-10-CM

## 2021-01-27 DIAGNOSIS — I10 ESSENTIAL HYPERTENSION: ICD-10-CM

## 2021-01-27 RX ORDER — GUAIFENESIN 600 MG/1
600 TABLET, EXTENDED RELEASE ORAL DAILY
COMMUNITY
End: 2021-10-04

## 2021-01-27 RX ORDER — TROLAMINE SALICYLATE 10 G/100G
2 CREAM TOPICAL EVERY MORNING
COMMUNITY
End: 2021-04-12

## 2021-01-27 RX ORDER — ACETAMINOPHEN 500 MG
1000 TABLET ORAL DAILY
COMMUNITY
End: 2021-05-14

## 2021-01-27 RX ORDER — ANTIOX #8/OM3/DHA/EPA/LUT/ZEAX 250-2.5 MG
1 CAPSULE ORAL DAILY
COMMUNITY
End: 2021-02-22

## 2021-01-27 NOTE — LETTER
1/27/2021        RE: Judith M Trierweiler [facility]  [address]  [address]  [facility] [address]        [facility]  [place] Medical Record Number: [id number]  Place of Service where encounter took place: [facility] [employer]  [facility] ([facility]) [[id number]]  Chief Complaint   Patient presents with     Pain     Hypertension     Cough       HPI:    Judith M Trierweiler is a 80 year old (1940), who is being seen today for an episodic care visit. HPI information obtained from: facility chart records, facility staff, patient report, [facility] chart review and family/first contact son report. Today's concern is:  Pain, HTN, cough. Has memory loss due to dementia.  Cont. To refuse am meds, to get OOB in am.  Cont. On isolation precautions due to covid-has refused nasal swabs.  Afebrile.  Reports some L hip pain in am. Cont. On tylenol, prn aspercreme.  Reports R shoulder pain has been stable.  No longer amb.  For HTN cont. On norvasc, lisinopril, metoprolol.  Has hold parameter for metoprolol-held on occasion. BP stable today. Has had ongoing cough.  Completed course of anbtx. Has been afebrile.  Cough freq. Sig. Decreased over past couple weeks per staff.  Cont. On mucinex.     Past Medical and Surgical History reviewed in Epic today.    MEDICATIONS:      REVIEW OF SYSTEMS:  Unobtainable secondary to cognitive impairment. Reports some am R hip pain    Objective:  /47   Pulse 60   Resp 18   SpO2 97%   Exam:  GENERAL APPEARANCE:  Alert, in no distress, cooperative  ENT:  Mouth and posterior oropharynx normal, moist mucous membranes, [place]  EYES:  EOM, conjunctivae, lids, pupils and irises normal, PERRL  RESP:  respiratory effort and palpation of chest normal, lungs clear to auscultation , no respiratory distress, diminished breath sounds bibasilar. no cough with deep inspiration  CV:  Palpation and auscultation of heart done , regular rate and rhythm, no murmur, rub, or  gallop, no edema  ABDOMEN:  normal bowel sounds, soft, nontender, no hepatosplenomegaly or other masses, no guarding or rebound  M/S:   muscle strength 5/5 UEs, gen. LE weakness.  no apparent pain with ROM LEs  NEURO:   Cranial nerves 2-12 are normal tested and grossly at patient's baseline, speech clear  PSYCH:  insight and judgement impaired, memory impaired , affect and mood normal    Labs:     Most Recent 3 BMP's:  Recent Labs   Lab Test 09/15/20 03/03/20 02/17/20    136 144   POTASSIUM 4.7 4.4 4.9   CHLORIDE 110* 105 110*   CO2 21* 24 25   BUN 40* 35* 37*   CR 1.34* 1.20* 1.47*   ANIONGAP 9 7 9   SLIME 9.5 9.1 9.2   GLC 63* 67* 87       ASSESSMENT/PLAN:  Pain  R shoulder pain stable. Ongoing am R hip pain  1. Cont. Sched., prn tylenol  2. Start sched. Am aspercreme to R hip. Cont. Prn aspercreme  3. Encourage to sit up in chair as michael throughout the day  4. Monitor for increased diff. With transfers. Currently +1 assist with tranfers    Essential hypertension  BP stable today. H/o refusing am meds-metoprolol at times  1. Change every day meds to pm  2. Cont. Bid metoprolol with hold parameters  3. Cont. Norvasc, lisinopril  4. Consider f/u bmp over next 1-3 mos-has refused labs in past  5. Encourage fluids    Cough  Improved, decrease in freq.  O2 sats improved over past month. Afebrile  1. Follow O2 sats, temp  2. Attempt to swab for covid tomorrow-has been refusing  3. Cont. Isolation precautions  4. Decrease mucinex to every day  5. Monitor for wheezing, sob, decrease O2 asts      Electronically signed by:  JAYLAN Morales CNP               Sincerely,        JAYLAN Morales CNP

## 2021-01-27 NOTE — PROGRESS NOTES
[place] [facility]  [place] Medical Record Number: [id number]  Place of Service where encounter took place: [facility]  [facility] ([facility]) [[id number]]  Chief Complaint   Patient presents with     Pain     Hypertension     Cough       HPI:    Judith M Trierweiler is a 80 year old (1940), who is being seen today for an episodic care visit. HPI information obtained from: facility chart records, facility staff, patient report, [facility] chart review and family/first contact son report. Today's concern is:  Pain, HTN, cough. Has memory loss due to dementia.  Cont. To refuse am meds, to get OOB in am.  Cont. On isolation precautions due to covid-has refused nasal swabs.  Afebrile.  Reports some L hip pain in am. Cont. On tylenol, prn aspercreme.  Reports R shoulder pain has been stable.  No longer amb.  For HTN cont. On norvasc, lisinopril, metoprolol.  Has hold parameter for metoprolol-held on occasion. BP stable today. Has had ongoing cough.  Completed course of anbtx. Has been afebrile.  Cough freq. Sig. Decreased over past couple weeks per staff.  Cont. On mucinex.     Past Medical and Surgical History reviewed in Epic today.    MEDICATIONS:      REVIEW OF SYSTEMS:  Unobtainable secondary to cognitive impairment. Reports some am R hip pain    Objective:  /47   Pulse 60   Resp 18   SpO2 97%   Exam:  GENERAL APPEARANCE:  Alert, in no distress, cooperative  ENT:  Mouth and posterior oropharynx normal, moist mucous membranes, [place]  EYES:  EOM, conjunctivae, lids, pupils and irises normal, PERRL  RESP:  respiratory effort and palpation of chest normal, lungs clear to auscultation , no respiratory distress, diminished breath sounds bibasilar. no cough with deep inspiration  CV:  Palpation and auscultation of heart done , regular rate and rhythm, no murmur, rub, or gallop, no edema  ABDOMEN:  normal bowel sounds, soft, nontender, no hepatosplenomegaly or other masses, no guarding or  rebound  M/S:   muscle strength 5/5 UEs, gen. LE weakness.  no apparent pain with ROM LEs  NEURO:   Cranial nerves 2-12 are normal tested and grossly at patient's baseline, speech clear  PSYCH:  insight and judgement impaired, memory impaired , affect and mood normal    Labs:     Most Recent 3 BMP's:  Recent Labs   Lab Test 09/15/20 03/03/20 02/17/20    136 144   POTASSIUM 4.7 4.4 4.9   CHLORIDE 110* 105 110*   CO2 21* 24 25   BUN 40* 35* 37*   CR 1.34* 1.20* 1.47*   ANIONGAP 9 7 9   SLIME 9.5 9.1 9.2   GLC 63* 67* 87       ASSESSMENT/PLAN:  Pain  R shoulder pain stable. Ongoing am R hip pain  1. Cont. Sched., prn tylenol  2. Start sched. Am aspercreme to R hip. Cont. Prn aspercreme  3. Encourage to sit up in chair as michael throughout the day  4. Monitor for increased diff. With transfers. Currently +1 assist with tranfers    Essential hypertension  BP stable today. H/o refusing am meds-metoprolol at times  1. Change every day meds to pm  2. Cont. Bid metoprolol with hold parameters  3. Cont. Norvasc, lisinopril  4. Consider f/u bmp over next 1-3 mos-has refused labs in past  5. Encourage fluids    Cough  Improved, decrease in freq.  O2 sats improved over past month. Afebrile  1. Follow O2 sats, temp  2. Attempt to swab for covid tomorrow-has been refusing  3. Cont. Isolation precautions  4. Decrease mucinex to every day  5. Monitor for wheezing, sob, decrease O2 asts      Electronically signed by:  JAYLAN Morales CNP

## 2021-01-31 ENCOUNTER — PATIENT OUTREACH (OUTPATIENT)
Dept: GERIATRIC MEDICINE | Facility: CLINIC | Age: 81
End: 2021-01-31

## 2021-02-01 NOTE — PROGRESS NOTES
[employer] [facility] [employer] Medicare Initial enrollment    Member was assigned to [facility] for [employer] Medicare Case Management on: 1-1-21  Review of member's Epic chart completed.  No triggering events noted  CC will follow up as needed.    Pam Brown MA [facility] Care Coordinator   [phone] - hyic cell phone   [phone] - dpuf fax

## 2021-02-18 ENCOUNTER — RECORDS - HEALTHEAST (OUTPATIENT)
Dept: LAB | Facility: CLINIC | Age: 81
End: 2021-02-18

## 2021-02-18 DIAGNOSIS — N39.46 MIXED STRESS AND URGE URINARY INCONTINENCE: ICD-10-CM

## 2021-02-18 LAB
SARS-COV-2 PCR COMMENT: NORMAL
SARS-COV-2 RNA SPEC QL NAA+PROBE: NEGATIVE
SARS-COV-2 VIRUS SPECIMEN SOURCE: NORMAL

## 2021-02-20 DIAGNOSIS — E56.9 VITAMIN DEFICIENCY: Primary | ICD-10-CM

## 2021-02-22 RX ORDER — VIT A/VIT C/VIT E/ZINC/COPPER 4296-226
CAPSULE ORAL
Qty: 31 CAPSULE | Refills: 97 | Status: SHIPPED | OUTPATIENT
Start: 2021-02-22

## 2021-02-25 ENCOUNTER — RECORDS - HEALTHEAST (OUTPATIENT)
Dept: LAB | Facility: CLINIC | Age: 81
End: 2021-02-25

## 2021-03-04 DIAGNOSIS — K64.4 EXTERNAL HEMORRHOIDS: Primary | ICD-10-CM

## 2021-03-04 RX ORDER — MINERAL OIL, PETROLATUM, PHENYLEPHRINE HCL 14; 74.9; .25 G/100G; G/100G; G/100G
OINTMENT RECTAL
Qty: 57 G | Refills: 97 | Status: SHIPPED | OUTPATIENT
Start: 2021-03-04 | End: 2021-11-11

## 2021-03-08 ENCOUNTER — ASSISTED LIVING VISIT (OUTPATIENT)
Dept: GERIATRICS | Facility: CLINIC | Age: 81
End: 2021-03-08
Payer: COMMERCIAL

## 2021-03-08 VITALS
SYSTOLIC BLOOD PRESSURE: 137 MMHG | OXYGEN SATURATION: 94 % | DIASTOLIC BLOOD PRESSURE: 69 MMHG | RESPIRATION RATE: 18 BRPM | HEART RATE: 62 BPM

## 2021-03-08 DIAGNOSIS — K21.9 GASTROESOPHAGEAL REFLUX DISEASE, UNSPECIFIED WHETHER ESOPHAGITIS PRESENT: ICD-10-CM

## 2021-03-08 DIAGNOSIS — F02.80 LATE ONSET ALZHEIMER'S DISEASE WITHOUT BEHAVIORAL DISTURBANCE (H): ICD-10-CM

## 2021-03-08 DIAGNOSIS — M62.81 GENERALIZED MUSCLE WEAKNESS: Primary | ICD-10-CM

## 2021-03-08 DIAGNOSIS — G30.1 LATE ONSET ALZHEIMER'S DISEASE WITHOUT BEHAVIORAL DISTURBANCE (H): ICD-10-CM

## 2021-03-08 NOTE — PROGRESS NOTES
[place] [facility]  [place] Medical Record Number:  [id number]  Place of Service where encounter took place:  [facility] [employer] [facility]  [facility] ([facility]) [[id number]]  Chief Complaint   Patient presents with     Fatigue       HPI:    Judith M Trierweiler  is a 80 year old (1940), who is being seen today for an episodic care visit.  HPI information obtained from: facility chart records, facility staff, patient report and [facility] chart review. Today's concern is: weakness, alzheimer's, GERD.  Per staff, has had ongoing increased diff. With transfers at times.  Does not always assist with standing.  Requires +2 assist at times.  No longer amb. Per staff, has not had recent pain complaints.  Has completed PT.  No recent falls.  Has memory loss due to alzheimer's. Cont. On celexa.  Can be resistive with cares at times, refuses meds at times.  Does come out to common area for meals.  No recent reports of nausea or heartburn.  Cont. On prilosec.       Past Medical and Surgical History reviewed in Epic today.    MEDICATIONS:          REVIEW OF SYSTEMS:  Unobtainable secondary to cognitive impairment. Reports feeling fine today    Objective:  /69   Pulse 62   Resp 18   SpO2 94%   Exam:  GENERAL APPEARANCE:  Alert, in no distress, cooperative  ENT:  Mouth and posterior oropharynx normal, moist mucous membranes, [place]  EYES:  EOM, conjunctivae, lids, pupils and irises normal, PERRL  RESP:  respiratory effort and palpation of chest normal, lungs clear to auscultation , no respiratory distress, diminished breath sounds bibasilar  CV:  Palpation and auscultation of heart done , regular rate and rhythm, no murmur, rub, or gallop, no edema  ABDOMEN:  normal bowel sounds, soft, nontender, no hepatosplenomegaly or other masses, no guarding or rebound  M/S:   muscle strength 5/5 UEs, gen. LE weakness.    NEURO:   Cranial nerves 2-12 are normal tested and grossly at patient's baseline, speech clear  PSYCH:   insight and judgement impaired, memory impaired , affect and mood normal    Labs:     Most Recent 3 CBC's:  Recent Labs   Lab Test 02/17/20 01/07/20   WBC 7.8  --    HGB 11.2* 11.5*   MCV 98  --      --      Most Recent 3 BMP's:  Recent Labs   Lab Test 09/15/20 03/03/20 02/17/20    136 144   POTASSIUM 4.7 4.4 4.9   CHLORIDE 110* 105 110*   CO2 21* 24 25   BUN 40* 35* 37*   CR 1.34* 1.20* 1.47*   ANIONGAP 9 7 9   SLIME 9.5 9.1 9.2   GLC 63* 67* 87       ASSESSMENT/PLAN:  Generalized muscle weakness  Ongoing. Increased diff. With transfers at times. Completed PT  1. Cont. Tylenol for pain  2. Cont. Assist with transfers  3. For further increased diff. With transfers, may try EZ stand  4. Encourage to get up in w.c., participate in activities    Late onset Alzheimer's disease without behavioral disturbance (H)  Memory loss. No recent changes in mood, behaviors  1. Cont. celexa  2. Cont. Encourage to go to all meals  3. Monitor for reports of increased anxiety  4. Cont. Prophylaxis macrobid for UTIs    Gastroesophageal reflux disease, unspecified whether esophagitis present  Currently stable  1.cont. prilosec  2. Follow wt.s  3. Monitor for decrease in po itnake  4. Bmp in next 1-3 mos      Electronically signed by:  JAYLAN Morales CNP

## 2021-03-08 NOTE — LETTER
3/8/2021        RE: Judith M Trierweiler [facility]  [address]  [address]  [facility] [address]        [place] [facility]  [place] Medical Record Number:  [id number]  Place of Service where encounter took place:  [facility] [employer]  [facility] ([facility]) [[id number]]  Chief Complaint   Patient presents with     Fatigue       HPI:    Judith M Trierweiler  is a 80 year old (1940), who is being seen today for an episodic care visit.  HPI information obtained from: facility chart records, facility staff, patient report and [facility] chart review. Today's concern is: weakness, alzheimer's, GERD.  Per staff, has had ongoing increased diff. With transfers at times.  Does not always assist with standing.  Requires +2 assist at times.  No longer amb. Per staff, has not had recent pain complaints.  Has completed PT.  No recent falls.  Has memory loss due to alzheimer's. Cont. On celexa.  Can be resistive with cares at times, refuses meds at times.  Does come out to common area for meals.  No recent reports of nausea or heartburn.  Cont. On prilosec.       Past Medical and Surgical History reviewed in Epic today.    MEDICATIONS:          REVIEW OF SYSTEMS:  Unobtainable secondary to cognitive impairment. Reports feeling fine today    Objective:  /69   Pulse 62   Resp 18   SpO2 94%   Exam:  GENERAL APPEARANCE:  Alert, in no distress, cooperative  ENT:  Mouth and posterior oropharynx normal, moist mucous membranes, [place]  EYES:  EOM, conjunctivae, lids, pupils and irises normal, PERRL  RESP:  respiratory effort and palpation of chest normal, lungs clear to auscultation , no respiratory distress, diminished breath sounds bibasilar  CV:  Palpation and auscultation of heart done , regular rate and rhythm, no murmur, rub, or gallop, no edema  ABDOMEN:  normal bowel sounds, soft, nontender, no hepatosplenomegaly or other masses, no guarding or rebound  M/S:   muscle strength 5/5 UEs, gen. LE  weakness.    NEURO:   Cranial nerves 2-12 are normal tested and grossly at patient's baseline, speech clear  PSYCH:  insight and judgement impaired, memory impaired , affect and mood normal    Labs:     Most Recent 3 CBC's:  Recent Labs   Lab Test 02/17/20 01/07/20   WBC 7.8  --    HGB 11.2* 11.5*   MCV 98  --      --      Most Recent 3 BMP's:  Recent Labs   Lab Test 09/15/20 03/03/20 02/17/20    136 144   POTASSIUM 4.7 4.4 4.9   CHLORIDE 110* 105 110*   CO2 21* 24 25   BUN 40* 35* 37*   CR 1.34* 1.20* 1.47*   ANIONGAP 9 7 9   SLIME 9.5 9.1 9.2   GLC 63* 67* 87       ASSESSMENT/PLAN:  Generalized muscle weakness  Ongoing. Increased diff. With transfers at times. Completed PT  1. Cont. Tylenol for pain  2. Cont. Assist with transfers  3. For further increased diff. With transfers, may try EZ stand  4. Encourage to get up in w.c., participate in activities    Late onset Alzheimer's disease without behavioral disturbance (H)  Memory loss. No recent changes in mood, behaviors  1. Cont. celexa  2. Cont. Encourage to go to all meals  3. Monitor for reports of increased anxiety  4. Cont. Prophylaxis macrobid for UTIs    Gastroesophageal reflux disease, unspecified whether esophagitis present  Currently stable  1.cont. prilosec  2. Follow wt.s  3. Monitor for decrease in po itnake  4. Bmp in next 1-3 mos      Electronically signed by:  JAYLAN Morales CNP                 Sincerely,        JAYLAN Moralse CNP

## 2021-04-04 ENCOUNTER — HEALTH MAINTENANCE LETTER (OUTPATIENT)
Age: 81
End: 2021-04-04

## 2021-04-09 ENCOUNTER — RECORDS - HEALTHEAST (OUTPATIENT)
Dept: LAB | Facility: CLINIC | Age: 81
End: 2021-04-09

## 2021-04-09 DIAGNOSIS — R52 PAIN: Primary | ICD-10-CM

## 2021-04-12 RX ORDER — TROLAMINE SALICYLATE 10 G/100G
CREAM TOPICAL
Qty: 85 G | Refills: 97 | Status: SHIPPED | OUTPATIENT
Start: 2021-04-12 | End: 2021-09-24

## 2021-04-19 ENCOUNTER — ASSISTED LIVING VISIT (OUTPATIENT)
Dept: GERIATRICS | Facility: CLINIC | Age: 81
End: 2021-04-19
Payer: COMMERCIAL

## 2021-04-19 VITALS
RESPIRATION RATE: 16 BRPM | HEART RATE: 75 BPM | DIASTOLIC BLOOD PRESSURE: 77 MMHG | SYSTOLIC BLOOD PRESSURE: 103 MMHG | OXYGEN SATURATION: 93 % | TEMPERATURE: 98.1 F

## 2021-04-19 DIAGNOSIS — M62.81 GENERALIZED MUSCLE WEAKNESS: ICD-10-CM

## 2021-04-19 DIAGNOSIS — F41.9 ANXIETY: Primary | ICD-10-CM

## 2021-04-19 DIAGNOSIS — I10 ESSENTIAL HYPERTENSION: ICD-10-CM

## 2021-04-19 NOTE — PROGRESS NOTES
[place] [facility]  [place] [facility] Record Number:  [id number]  Place of Service where encounter took place:  [facility] [facility]  [facility] ([facility]) [[id number]]  Chief Complaint   Patient presents with     Anxiety       HPI:    Judith M Trierweiler  is a 80 year old (1940), who is being seen today for an episodic care visit.  HPI information obtained from: facility chart records, facility staff, patient report and [facility] chart review. Today's concern is: anxiety, weakness, HTN. Has alzheimer's.  Cont. On celexa.  Per staff, has had increased anxiety at times, typically when in common areas with peers-can have verbal outbursts. Per staff, more freq. In pms.  Has had some functional decline.  No longer amb. Assist to stand, pivot transfers. No recent reports of pain.  Had course of PT with minimal progress made.  For HTN cont. On norvasc, lisinopril, lopressor. BPs, HRs gen. Stable.  Per staff, has had adequate fluid intake.  No recent reports of increased constipation.       Past Medical and Surgical History reviewed in Epic today.    MEDICATIONS:          REVIEW OF SYSTEMS:  Unobtainable secondary to cognitive impairment. Reports feeling fine today.  No current pains    Objective:  /77   Pulse 75   Temp 98.1  F (36.7  C)   Resp 16   SpO2 93%   Exam:  GENERAL APPEARANCE:  Alert, in no distress, cooperative  ENT:  Mouth and posterior oropharynx normal, moist mucous membranes, [place]  EYES:  EOM, conjunctivae, lids, pupils and irises normal, PERRL  NECK:  No adenopathy,masses or thyromegaly, FROM  RESP:  respiratory effort and palpation of chest normal, lungs clear to auscultation , no respiratory distress  CV:  Palpation and auscultation of heart done , regular rate and rhythm, no murmur, rub, or gallop, no edema  ABDOMEN:  normal bowel sounds, soft, nontender, no hepatosplenomegaly or other masses, no guarding or rebound  M/S:   muscle strength 5/5 UEs, gen. LE weakness, normal  tone  NEURO:   Cranial nerves 2-12 are normal tested and grossly at patient's baseline, speech clear  PSYCH:  insight and judgement impaired, memory impaired , affect abnormal -flat    Labs:     Most Recent 3 CBC's:  Recent Labs   Lab Test 02/17/20 01/07/20   WBC 7.8  --    HGB 11.2* 11.5*   MCV 98  --      --      Most Recent 3 BMP's:  Recent Labs   Lab Test 09/15/20 03/03/20 02/17/20    136 144   POTASSIUM 4.7 4.4 4.9   CHLORIDE 110* 105 110*   CO2 21* 24 25   BUN 40* 35* 37*   CR 1.34* 1.20* 1.47*   ANIONGAP 9 7 9   SLIME 9.5 9.1 9.2   GLC 63* 67* 87       ASSESSMENT/PLAN:  Anxiety  Increased s/s over past few weeks. Verbal outbursts at times. Memory loss due to alzheimer's  1. Cont. celexa  2. Staff to redirect resident away from activities when having verbal outbursts  3. Monitor for changes in po intake  4. Monitor for increase freq. Of med refusal  5. For ongoing increased anxiety, sundowning behaviors, may consider starting serqouel    Generalized muscle weakness  Ongoing. No longer amb. Assist to transfer.  No recent increase in pain  1. Cont current pain meds  2. Monitor for further increase diff. With transfers-may refer to PT for eval  3. Monitor for pain, decreased ROM  4. Encourage to sit up in chair as michael throughout the day    Essential hypertension  Sl lower BP today, typically stable  1. Cont. Norvasc, lisinopril, lopressor  2. Follow BPs, HRs  3. For SBPs< 100, may decrease norvasc dose  4. Encourage fluids      Electronically signed by:  JAYLAN Morales CNP

## 2021-04-19 NOTE — LETTER
4/19/2021        RE: Judith M Trierweiler [facility]  [address]  [facility] [address]        [place] [facility]  [place] Medical Record Number:  [id number]  Place of Service where encounter took place:  [facility] [place]  [facility] ([facility]) [[id number]]  Chief Complaint   Patient presents with     Anxiety       HPI:    Judith M Trierweiler  is a 80 year old (1940), who is being seen today for an episodic care visit.  HPI information obtained from: facility chart records, facility staff, patient report and [facility] chart review. Today's concern is: anxiety, weakness, HTN. Has alzheimer's.  Cont. On celexa.  Per staff, has had increased anxiety at times, typically when in common areas with peers-can have verbal outbursts. Per staff, more freq. In pms.  Has had some functional decline.  No longer amb. Assist to stand, pivot transfers. No recent reports of pain.  Had course of PT with minimal progress made.  For HTN cont. On norvasc, lisinopril, lopressor. BPs, HRs gen. Stable.  Per staff, has had adequate fluid intake.  No recent reports of increased constipation.       Past Medical and Surgical History reviewed in Epic today.    MEDICATIONS:          REVIEW OF SYSTEMS:  Unobtainable secondary to cognitive impairment. Reports feeling fine today.  No current pains    Objective:  /77   Pulse 75   Temp 98.1  F (36.7  C)   Resp 16   SpO2 93%   Exam:  GENERAL APPEARANCE:  Alert, in no distress, cooperative  ENT:  Mouth and posterior oropharynx normal, moist mucous membranes, [place]  EYES:  EOM, conjunctivae, lids, pupils and irises normal, PERRL  NECK:  No adenopathy,masses or thyromegaly, FROM  RESP:  respiratory effort and palpation of chest normal, lungs clear to auscultation , no respiratory distress  CV:  Palpation and auscultation of heart done , regular rate and rhythm, no murmur, rub, or gallop, no edema  ABDOMEN:  normal bowel sounds, soft, nontender, no  hepatosplenomegaly or other masses, no guarding or rebound  M/S:   muscle strength 5/5 UEs, gen. LE weakness, normal tone  NEURO:   Cranial nerves 2-12 are normal tested and grossly at patient's baseline, speech clear  PSYCH:  insight and judgement impaired, memory impaired , affect abnormal -flat    Labs:     Most Recent 3 CBC's:  Recent Labs   Lab Test 02/17/20 01/07/20   WBC 7.8  --    HGB 11.2* 11.5*   MCV 98  --      --      Most Recent 3 BMP's:  Recent Labs   Lab Test 09/15/20 03/03/20 02/17/20    136 144   POTASSIUM 4.7 4.4 4.9   CHLORIDE 110* 105 110*   CO2 21* 24 25   BUN 40* 35* 37*   CR 1.34* 1.20* 1.47*   ANIONGAP 9 7 9   SLIME 9.5 9.1 9.2   GLC 63* 67* 87       ASSESSMENT/PLAN:  Anxiety  Increased s/s over past few weeks. Verbal outbursts at times. Memory loss due to alzheimer's  1. Cont. celexa  2. Staff to redirect resident away from activities when having verbal outbursts  3. Monitor for changes in po intake  4. Monitor for increase freq. Of med refusal  5. For ongoing increased anxiety, sundowning behaviors, may consider starting serqouel    Generalized muscle weakness  Ongoing. No longer amb. Assist to transfer.  No recent increase in pain  1. Cont current pain meds  2. Monitor for further increase diff. With transfers-may refer to PT for eval  3. Monitor for pain, decreased ROM  4. Encourage to sit up in chair as michael throughout the day    Essential hypertension  Sl lower BP today, typically stable  1. Cont. Norvasc, lisinopril, lopressor  2. Follow BPs, HRs  3. For SBPs< 100, may decrease norvasc dose  4. Encourage fluids      Electronically signed by:  JAYLAN Morales CNP                 Sincerely,        JAYLAN Morales CNP

## 2021-04-29 DIAGNOSIS — R19.7 DIARRHEA, UNSPECIFIED TYPE: Primary | ICD-10-CM

## 2021-04-29 RX ORDER — LOPERAMIDE HCL 2 MG
CAPSULE ORAL
Qty: 30 CAPSULE | Refills: 97 | Status: SHIPPED | OUTPATIENT
Start: 2021-04-29

## 2021-05-03 ENCOUNTER — ASSISTED LIVING VISIT (OUTPATIENT)
Dept: GERIATRICS | Facility: CLINIC | Age: 81
End: 2021-05-03
Payer: COMMERCIAL

## 2021-05-03 VITALS
SYSTOLIC BLOOD PRESSURE: 125 MMHG | DIASTOLIC BLOOD PRESSURE: 67 MMHG | RESPIRATION RATE: 16 BRPM | HEART RATE: 68 BPM | OXYGEN SATURATION: 92 %

## 2021-05-03 DIAGNOSIS — M15.9 OSTEOARTHRITIS OF MULTIPLE JOINTS, UNSPECIFIED OSTEOARTHRITIS TYPE: ICD-10-CM
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
## 2021-05-03 DIAGNOSIS — I10 ESSENTIAL HYPERTENSION: ICD-10-CM

## 2021-05-03 NOTE — LETTER
5/3/2021        RE: Judith M Trierweiler [facility]  [address]  [facility] [address]        [place] [facility]  [place] [facility] Record Number:  [id number]  Place of Service where encounter took place:  [facility] [employer]  [facility] ([facility]) [[id number]]  Chief Complaint   Patient presents with     Fall       HPI:    Judith M Trierweiler  is a 80 year old (1940), who is being seen today for an episodic care visit.  HPI information obtained from: facility chart records, facility staff, patient report and [facility] chart review. Today's concern is:  Fall, OA, HTN.  S/p fall yesterday.  Found lying on floor by bed.  No apparent exac of chronic shoulder or back pain.  Cont. On tylenol, aspercreme to shoulders.  No reports of back or LE pain.  Neuros intact.  For HTN cont. On norvasc, lisinopril, metoprolol.  BPs, HRs stable.  Per staff, has adequate fluid intake.    Past Medical and Surgical History reviewed in Epic today.    MEDICATIONS:          REVIEW OF SYSTEMS:  Unobtainable secondary to cognitive impairment. Reports feeling fine today    Objective:  /67   Pulse 68   Resp 16   SpO2 92%   Exam:  GENERAL APPEARANCE:  Alert, in no distress, appears healthy  ENT:  Mouth and posterior oropharynx normal, moist mucous membranes, [place]  EYES:  EOM, conjunctivae, lids, pupils and irises normal, PERRL  NECK:  No adenopathy,masses or thyromegaly, FROM  RESP:  respiratory effort and palpation of chest normal, lungs clear to auscultation , no respiratory distress  CV:  Palpation and auscultation of heart done , regular rate and rhythm, no murmur, rub, or gallop, no edema  ABDOMEN:  normal bowel sounds, soft, nontender, no hepatosplenomegaly or other masses, no guarding or rebound  M/S:   muscle strength 5/5 UEs, gen. LE weakness. no apparent pain with PROM all 4 ext.  NEURO:   Cranial nerves 2-12 are normal tested and grossly at patient's baseline, speech clear  PSYCH:  insight  and judgement impaired, memory impaired , affect abnormal -flat    Labs:   Recent labs in [facility] reviewed by me today.     ASSESSMENT/PLAN:  Fall, initial encounter  Apparent fall out of bed, possibly during attempt to self-transfer.  Is non-amb. Assist with stand, pivot transfers.  1. Monitor for attempts to self-transfer  2. Cont. Freq. Assist to toilet  3. Monitor for s/s UTI  4. Monitor skin for breakdown    Osteoarthritis of multiple joints, unspecified osteoarthritis type  Pain gen. Stable. No apparent exac. With falls  1. Cont. Tylenol  2. Cont. aspercreme  3. Monitor for reports of pain with ROM, decrease in ROM extremties  4. Encourage increase in act. Level as michael    Essential hypertension  BPs, HRs stable.  No recent reports of dizziness  1. Cont. Norvasc, lisinopril, metoprolol  2. Encourage fluids  3. Monitor for reports of dizziness      Electronically signed by:  JAYLAN Morales CNP                 Sincerely,        JAYLAN Morales CNP

## 2021-05-03 NOTE — PROGRESS NOTES
[place] [facility]  [place] Medical Record Number:  [id number]  Place of Service where encounter took place:  [facility]  [facility] ([facility]) [[id number]]  Chief Complaint   Patient presents with     Fall       HPI:    Judith M Trierweiler  is a 80 year old (1940), who is being seen today for an episodic care visit.  HPI information obtained from: facility chart records, facility staff, patient report and [facility] chart review. Today's concern is:  Fall, OA, HTN.  S/p fall yesterday.  Found lying on floor by bed.  No apparent exac of chronic shoulder or back pain.  Cont. On tylenol, aspercreme to shoulders.  No reports of back or LE pain.  Neuros intact.  For HTN cont. On norvasc, lisinopril, metoprolol.  BPs, HRs stable.  Per staff, has adequate fluid intake.    Past Medical and Surgical History reviewed in Epic today.    MEDICATIONS:          REVIEW OF SYSTEMS:  Unobtainable secondary to cognitive impairment. Reports feeling fine today    Objective:  /67   Pulse 68   Resp 16   SpO2 92%   Exam:  GENERAL APPEARANCE:  Alert, in no distress, appears healthy  ENT:  Mouth and posterior oropharynx normal, moist mucous membranes, [place]  EYES:  EOM, conjunctivae, lids, pupils and irises normal, PERRL  NECK:  No adenopathy,masses or thyromegaly, FROM  RESP:  respiratory effort and palpation of chest normal, lungs clear to auscultation , no respiratory distress  CV:  Palpation and auscultation of heart done , regular rate and rhythm, no murmur, rub, or gallop, no edema  ABDOMEN:  normal bowel sounds, soft, nontender, no hepatosplenomegaly or other masses, no guarding or rebound  M/S:   muscle strength 5/5 UEs, gen. LE weakness. no apparent pain with PROM all 4 ext.  NEURO:   Cranial nerves 2-12 are normal tested and grossly at patient's baseline, speech clear  PSYCH:  insight and judgement impaired, memory impaired , affect abnormal -flat    Labs:   Recent labs in [facility] reviewed by me  today.     ASSESSMENT/PLAN:  Fall, initial encounter  Apparent fall out of bed, possibly during attempt to self-transfer.  Is non-amb. Assist with stand, pivot transfers.  1. Monitor for attempts to self-transfer  2. Cont. Freq. Assist to toilet  3. Monitor for s/s UTI  4. Monitor skin for breakdown    Osteoarthritis of multiple joints, unspecified osteoarthritis type  Pain gen. Stable. No apparent exac. With falls  1. Cont. Tylenol  2. Cont. aspercreme  3. Monitor for reports of pain with ROM, decrease in ROM extremties  4. Encourage increase in act. Level as michael    Essential hypertension  BPs, HRs stable.  No recent reports of dizziness  1. Cont. Norvasc, lisinopril, metoprolol  2. Encourage fluids  3. Monitor for reports of dizziness      Electronically signed by:  JAYLAN Morales CNP

## 2021-05-14 DIAGNOSIS — R52 PAIN: Primary | ICD-10-CM

## 2021-05-14 RX ORDER — PSEUDOEPHED/ACETAMINOPH/DIPHEN 30MG-500MG
TABLET ORAL
Qty: 56 TABLET | Refills: 97 | Status: SHIPPED | OUTPATIENT
Start: 2021-05-14 | End: 2021-09-13

## 2021-05-24 ENCOUNTER — ASSISTED LIVING VISIT (OUTPATIENT)
Dept: GERIATRICS | Facility: CLINIC | Age: 81
End: 2021-05-24
Payer: COMMERCIAL

## 2021-05-24 VITALS
SYSTOLIC BLOOD PRESSURE: 134 MMHG | HEART RATE: 62 BPM | WEIGHT: 131 LBS | TEMPERATURE: 97.1 F | OXYGEN SATURATION: 96 % | DIASTOLIC BLOOD PRESSURE: 90 MMHG | BODY MASS INDEX: 26.46 KG/M2

## 2021-05-24 DIAGNOSIS — F02.80 LATE ONSET ALZHEIMER'S DISEASE WITHOUT BEHAVIORAL DISTURBANCE (H): ICD-10-CM

## 2021-05-24 DIAGNOSIS — N18.32 STAGE 3B CHRONIC KIDNEY DISEASE (H): ICD-10-CM

## 2021-05-24 DIAGNOSIS — K27.9 PUD (PEPTIC ULCER DISEASE): ICD-10-CM

## 2021-05-24 DIAGNOSIS — G30.1 LATE ONSET ALZHEIMER'S DISEASE WITHOUT BEHAVIORAL DISTURBANCE (H): ICD-10-CM

## 2021-05-24 DIAGNOSIS — M15.9 OSTEOARTHRITIS OF MULTIPLE JOINTS, UNSPECIFIED OSTEOARTHRITIS TYPE: ICD-10-CM

## 2021-05-24 DIAGNOSIS — M62.81 GENERALIZED MUSCLE WEAKNESS: ICD-10-CM

## 2021-05-24 DIAGNOSIS — I10 ESSENTIAL HYPERTENSION: Primary | ICD-10-CM

## 2021-05-24 NOTE — PROGRESS NOTES
"Judith M Trierweiler is a 80 year old female seen May 24, 2021 at [facility] where she has resided for >one year (admit 12/2019)   Pt is seen in her room, has returned to bed before lunch, in her pajamas.    States \"I'm here,\" but does not give much reliable history.   AL staff has noted some general decline, eating more variable and weight is down a bit.   She is no longer ambulatory, requiring assist of 2 for ADLs and transfer to , needing a lot more staff support for cares and continual encouragement to get out of bed.     She has dysphagia, needing her pills crushed.   Has seen [facility] last year.      I talked with son Edgar by phone.   He notes pt more confused at times, but she was able to enjoy a visit with family this past weekend.       She has carried a dx of dementia since 2016, last seen by Neurology in October 2019 specifically to address possibility of NPH as noted on MRI:  Neurologist thought this dx unlikely.     Decline in mobility has been present over the past couple of years, has had TCU stays and [facility]     Pt has had multiple hospitalizations in the past year including for a bleeding ulcer, and again for right LE DVT (records not available on Care Everywhere)    She is on 4 medications for HTN and has secondary CKD3   Hydrochlorothiazide discontinued secondary to decreased po intake.  Pt refuses lab draws.      Pt saw Urology 3/2020 and had workup for recurrent UTIs and urinary incontinence that included urodynamics and imaging.  She has very weak detrusor contraction and incomplete bladder emptying.   Remains on Myrbetriq that Urology prescribed.       Past Medical History:   Diagnosis Date     Arthritis      Cataract      Cerebral infarction (H)      CKD (chronic kidney disease)      Essential hypertension, benign      Falls frequently      Gastroesophageal reflux disease      Gout      Hemorrhoid      History of duodenal ulcer      History of DVT (deep vein " "thrombosis)      History of GI bleed      Hyperlipidemia      Macular degeneration      Psoriasis      Vitreous hemorrhage (H)      Past Surgical History:   Procedure Laterality Date     CATARACT       Z NONSPECIFIC PROCEDURE      Hysterectomy \"pre-cancer\"     Z NONSPECIFIC PROCEDURE      cholecystectomy     SH:   Mo still lives in their home in [facility]    Son Edgar lives in [facility], also has a daughter Jessica.     ROS: ambulatory with FWW  Wt Readings from Last 5 Encounters:   05/24/21 59.4 kg (131 lb)   01/07/21 59.4 kg (131 lb)   11/24/20 59.4 kg (131 lb)   03/10/20 62.1 kg (137 lb)   02/05/20 62.1 kg (137 lb)      EXAM:  NAD  BP (!) 134/90   Pulse 62   Temp 97.1  F (36.2  C)   Wt 59.4 kg (131 lb)   SpO2 96%   BMI 26.46 kg/m     Neck supple without adenopathy  Lungs with decreased BS, no rales or wheeze  Heart RRR s1s2 @85  Jose Raul soft, NT, no distention or guarding, no HSM +BS  Ext with trace edema.   Neuro: no focal deficits, speech normal, +STML and disorientation  Psych: affect flat    Labs reviewed.   Pt has refused lab draws of late.       IMP/PLAN:     (N18.32) Stage 3b chronic kidney disease  (I12.9,  N18.3) Benign hypertension with CKD (chronic kidney disease) stage III (H)    Comment:  GFR 38 at last check in September 2020  BP Readings from Last 3 Encounters:   05/24/21 (!) 134/90   05/03/21 125/67   04/19/21 103/77       Plan: metoprolol 25 mg bid, amlodipine 5 mg/day, lisinopril 20 mg /day    Need a recheck BMP; if GFR still low will need to readjust meds       (G30.1,  F02.80) Late onset Alzheimer's disease without behavioral disturbance (H)  Comment: gradual decline in cognition, language and mobility with low functional status   Plan: AL support for meds, meals, activity  Current downturn may be related to progression of her disease with apathetic features    (K27.9) PUD (peptic ulcer disease)  Comment: no recent bleed, hgb stable    Plan: omeprazole 20 mg/day       (R29.6) " Recurrent falls  Comment: LE weakness, poor safety awareness, no longer ambulatory   She has had several courses of PHYSICAL THERAPY without improvement in mobility  Plan: AL staff assists with mobility.     (F32.9) Depression, unspecified depression type  Comment: by history   Plan: continue citalopram 10 mg/day       (M15.0) Primary osteoarthritis involving multiple joints  Comment: decreased mobility   Plan: scheduled acetaminophen, ambulation with FWW     I talked with patient 's son Edgar by phone.   He is interested in pursuing workup of weight loss and weakness.  He states his sister can be present for lab draw if she is notified of when that will be.    Edgar is currently disinclined to comfort care approach despite patient's resistance to medical care.     Vale Lawrence MD

## 2021-05-24 NOTE — LETTER
"    5/24/2021        RE: Judith M Trierweiler  [facility]  [address]  [facility] [address]        Judith M Trierweiler is a 80 year old female seen May 24, 2021 at [facility] where she has resided for >one year (admit 12/2019)   Pt is seen in her room, has returned to bed before lunch, in her pajamas.    States \"I'm here,\" but does not give much reliable history.   AL staff has noted some general decline, eating more variable and weight is down a bit.   She is no longer ambulatory, requiring assist of 2 for ADLs and transfer to , needing a lot more staff support for cares and continual encouragement to get out of bed.     She has dysphagia, needing her pills crushed.   Has seen [facility] last year.      I talked with son Edgar by phone.   He notes pt more confused at times, but she was able to enjoy a visit with family this past weekend.       She has carried a dx of dementia since 2016, last seen by Neurology in October 2019 specifically to address possibility of NPH as noted on MRI:  Neurologist thought this dx unlikely.     Decline in mobility has been present over the past couple of years, has had TCU stays and [facility] PHYSICAL THERAP[facility]     Pt has had multiple hospitalizations in the past year including for a bleeding ulcer, and again for right LE DVT (records not available on Care Everywhere)    She is on 4 medications for HTN and has secondary CKD3   Hydrochlorothiazide discontinued secondary to decreased po intake.  Pt refuses lab draws.      Pt saw Urology 3/2020 and had workup for recurrent UTIs and urinary incontinence that included urodynamics and imaging.  She has very weak detrusor contraction and incomplete bladder emptying.   Remains on Myrbetriq that Urology prescribed.       Past Medical History:   Diagnosis Date     Arthritis      Cataract      Cerebral infarction (H)      CKD (chronic kidney disease)      Essential hypertension, benign      Falls frequently      " "Gastroesophageal reflux disease      Gout      Hemorrhoid      History of duodenal ulcer      History of DVT (deep vein thrombosis)      History of GI bleed      Hyperlipidemia      Macular degeneration      Psoriasis      Vitreous hemorrhage (H)      Past Surgical History:   Procedure Laterality Date     CATARACT       ZZC NONSPECIFIC PROCEDURE      Hysterectomy \"pre-cancer\"     ZZC NONSPECIFIC PROCEDURE      cholecystectomy     SH:   Mo still lives in their home in [facility]    Son Edgar lives in [facility], also has a daughter Jessica.     ROS: ambulatory with FWW  Wt Readings from Last 5 Encounters:   05/24/21 59.4 kg (131 lb)   01/07/21 59.4 kg (131 lb)   11/24/20 59.4 kg (131 lb)   03/10/20 62.1 kg (137 lb)   02/05/20 62.1 kg (137 lb)      EXAM:  NAD  BP (!) 134/90   Pulse 62   Temp 97.1  F (36.2  C)   Wt 59.4 kg (131 lb)   SpO2 96%   BMI 26.46 kg/m     Neck supple without adenopathy  Lungs with decreased BS, no rales or wheeze  Heart RRR s1s2 @85  Jose Raul soft, NT, no distention or guarding, no HSM +BS  Ext with trace edema.   Neuro: no focal deficits, speech normal, +STML and disorientation  Psych: affect flat    Labs reviewed.   Pt has refused lab draws of late.       IMP/PLAN:     (N18.32) Stage 3b chronic kidney disease  (I12.9,  N18.3) Benign hypertension with CKD (chronic kidney disease) stage III (H)    Comment:  GFR 38 at last check in September 2020  BP Readings from Last 3 Encounters:   05/24/21 (!) 134/90   05/03/21 125/67   04/19/21 103/77       Plan: metoprolol 25 mg bid, amlodipine 5 mg/day, lisinopril 20 mg /day    Need a recheck BMP; if GFR still low will need to readjust meds       (G30.1,  F02.80) Late onset Alzheimer's disease without behavioral disturbance (H)  Comment: gradual decline in cognition, language and mobility with low functional status   Plan: AL support for meds, meals, activity  Current downturn may be related to progression of her disease with apathetic " features    (K27.9) PUD (peptic ulcer disease)  Comment: no recent bleed, hgb stable    Plan: omeprazole 20 mg/day       (R29.6) Recurrent falls  Comment: LE weakness, poor safety awareness, no longer ambulatory   She has had several courses of PHYSICAL THERAPY without improvement in mobility  Plan: AL staff assists with mobility.     (F32.9) Depression, unspecified depression type  Comment: by history   Plan: continue citalopram 10 mg/day       (M15.0) Primary osteoarthritis involving multiple joints  Comment: decreased mobility   Plan: scheduled acetaminophen, ambulation with FWW     I talked with patient 's son Edgar by phone.   He is interested in pursuing workup of weight loss and weakness.  He states his sister can be present for lab draw if she is notified of when that will be.    Edgar is currently disinclined to comfort care approach despite patient's resistance to medical care.     Vale Lawrence MD         Sincerely,        Vale Lawrence MD

## 2021-06-09 ENCOUNTER — RECORDS - HEALTHEAST (OUTPATIENT)
Dept: LAB | Facility: CLINIC | Age: 81
End: 2021-06-09

## 2021-06-09 LAB
ALBUMIN UR-MCNC: ABNORMAL G/DL
APPEARANCE UR: ABNORMAL
BACTERIA #/AREA URNS HPF: ABNORMAL /[HPF]
BILIRUB UR QL STRIP: NEGATIVE
COLOR UR AUTO: YELLOW
GLUCOSE UR STRIP-MCNC: NEGATIVE MG/DL
HGB UR QL STRIP: NEGATIVE
KETONES UR STRIP-MCNC: NEGATIVE MG/DL
LEUKOCYTE ESTERASE UR QL STRIP: ABNORMAL
MUCOUS THREADS #/AREA URNS LPF: PRESENT LPF
NITRATE UR QL: NEGATIVE
PH UR STRIP: 5 [PH] (ref 5–8)
RBC URINE: 0 HPF
SP GR UR STRIP: 1.02 (ref 1–1.03)
SQUAMOUS EPITHELIAL: 2 /HPF
TRANSITIONAL EPI: 3 /HPF
UROBILINOGEN UR STRIP-ACNC: ABNORMAL
WBC URINE: >182 HPF

## 2021-06-10 ENCOUNTER — TELEPHONE (OUTPATIENT)
Dept: GERIATRICS | Facility: CLINIC | Age: 81
End: 2021-06-10

## 2021-06-10 NOTE — TELEPHONE ENCOUNTER
[facility] Nurse Triage Telephone Note    Provider: Rylan Farris NP  Facility: [facility]  Facility Type:  AL    Caller: Cinda  Call Back Number: [phone]    Allergies:    Allergies   Allergen Reactions     Lactose Unknown     Penicillins Unknown        Reason for call: Nurse called to report UA results.  Patient is not getting out of bed and not eating.  Patient has been combative with staff when they have been providing cares.      Verbal Order/Direction given by Provider: Start Cipro 250 mg po BID x 5 days.  Increase fluids and update provider when UC is back.      Provider giving Order:  Demi Gonzalez NP    Verbal Order given to: Cinda Yañez RN

## 2021-06-11 ENCOUNTER — ASSISTED LIVING VISIT (OUTPATIENT)
Dept: GERIATRICS | Facility: CLINIC | Age: 81
End: 2021-06-11
Payer: COMMERCIAL

## 2021-06-11 VITALS — HEART RATE: 78 BPM | RESPIRATION RATE: 18 BRPM | SYSTOLIC BLOOD PRESSURE: 129 MMHG | DIASTOLIC BLOOD PRESSURE: 77 MMHG

## 2021-06-11 DIAGNOSIS — G30.1 LATE ONSET ALZHEIMER'S DISEASE WITH BEHAVIORAL DISTURBANCE (H): ICD-10-CM

## 2021-06-11 DIAGNOSIS — M62.81 GENERALIZED MUSCLE WEAKNESS: ICD-10-CM

## 2021-06-11 DIAGNOSIS — F02.818 LATE ONSET ALZHEIMER'S DISEASE WITH BEHAVIORAL DISTURBANCE (H): ICD-10-CM

## 2021-06-11 DIAGNOSIS — N39.0 URINARY TRACT INFECTION WITHOUT HEMATURIA, SITE UNSPECIFIED: Primary | ICD-10-CM

## 2021-06-11 LAB — BACTERIA SPEC CULT: NORMAL

## 2021-06-11 NOTE — PROGRESS NOTES
[facility] [facility]  [place] Medical Record Number:  [id number]  Place of Service where encounter took place:  [facility]  [facility] ([facility]) [[id number]]  Chief Complaint   Patient presents with     UTI       HPI:    Judith M Trierweiler  is a 80 year old (1940), who is being seen today for an episodic care visit.  HPI information obtained from: facility chart records, facility staff, patient report and [facility] chart review. Today's concern is:  UTI, weakness, alzheimer's.  Has had decrease in act. Level. Not coming ut of apt. Much, some decrease in po intake.  More recently has had aggressive behaviors at times. Refusal to get out of bed at times earlier this week, some increase in weakness. UA sent. Appears +.  Started on cipro. UC pend. Per staff, has been out for breakfast this am, lunch yesterday. Participating in activities.  Some aggressive behaviors remain at times. Has memory loss due to alzheimer's.  Cont. On celexa.     Past Medical and Surgical History reviewed in Epic today.    MEDICATIONS:          REVIEW OF SYSTEMS:  Unobtainable secondary to cognitive impairment. Reports feeling ok today    Objective:  /77   Pulse 78   Resp 18   Exam:  GENERAL APPEARANCE:  Alert, in no distress, cooperative  ENT:  Mouth and posterior oropharynx normal, moist mucous membranes, [place]  EYES:  EOM, conjunctivae, lids, pupils and irises normal, PERRL  NECK:  No adenopathy,masses or thyromegaly, FROM  RESP:  respiratory effort and palpation of chest normal, lungs clear to auscultation , no respiratory distress, diminished breath sounds bibasilar  CV:  Palpation and auscultation of heart done , regular rate and rhythm, no murmur, rub, or gallop, no edema  ABDOMEN:  normal bowel sounds, soft, nontender, no hepatosplenomegaly or other masses, no guarding or rebound  M/S:   muscle strength 5/5 UEs, 4/5 LEs  NEURO:   Cranial nerves 2-12 are normal tested and grossly at patient's baseline,  speech clear  PSYCH:  insight and judgement impaired, memory impaired , affect and mood normal    Labs:   no recent labs    ASSESSMENT/PLAN:  Urinary tract infection without hematuria, site unspecified  Afebrile.  Gen. Weakness. Aggressive behaviors at times  1. Cont. Cipro, await UC results  2. Monitor for fever  3. Cont. Myrbetriq.  4. Encourage fluids    Generalized muscle weakness  May be r/t #1.  Decrease in act. Level.  Refusal to get OOB at times  1. Cbc, bmp next week-has refused labs in past, family will try to be present for lab draw  2. Encourage change in position, get up to w.c.  3. Monitor for skin breakdown  4. Cont. Tylenol for pain    Late onset Alzheimer's disease with behavioral disturbance (H)  Memory loss. Decrease in activity level, aggressive behaviors at times.  Current UTI  1. Complete anbtx course for UTI  2. Cont. celexa  3. Reassess mood, behaviors, once anbtx tx completed for UTI.  For ongoing aggressive behaviors, will discuss starting serqouel with son      Electronically signed by:  JAYLAN Morales CNP

## 2021-06-11 NOTE — LETTER
6/11/2021        RE: Judith M Trierweiler  [address]  [place] [address]        [place] [facility]  [place] [facility] Record Number:  [id number]  [facility]  [employer] ([facility] [[id number]]  Chief Complaint   Patient presents with     UTI       HPI:    Judith M Trierweiler  is a 80 year old (1940), who is being seen today for an episodic care visit.  HPI information obtained from: facility chart records, facility staff, patient report and [facility] chart review. Today's concern is:  UTI, weakness, alzheimer's.  Has had decrease in act. Level. Not coming ut of apt. Much, some decrease in po intake.  More recently has had aggressive behaviors at times. Refusal to get out of bed at times earlier this week, some increase in weakness. UA sent. Appears +.  Started on cipro. UC pend. Per staff, has been out for breakfast this am, lunch yesterday. Participating in activities.  Some aggressive behaviors remain at times. Has memory loss due to alzheimer's.  Cont. On celexa.     Past Medical and Surgical History reviewed in Epic today.    MEDICATIONS:          REVIEW OF SYSTEMS:  Unobtainable secondary to cognitive impairment. Reports feeling ok today    Objective:  /77   Pulse 78   Resp 18   Exam:  GENERAL APPEARANCE:  Alert, in no distress, cooperative  ENT:  Mouth and posterior oropharynx normal, moist mucous membranes, [place]  EYES:  EOM, conjunctivae, lids, pupils and irises normal, PERRL  NECK:  No adenopathy,masses or thyromegaly, FROM  RESP:  respiratory effort and palpation of chest normal, lungs clear to auscultation , no respiratory distress, diminished breath sounds bibasilar  CV:  Palpation and auscultation of heart done , regular rate and rhythm, no murmur, rub, or gallop, no edema  ABDOMEN:  normal bowel sounds, soft, nontender, no hepatosplenomegaly or other masses, no guarding or rebound  M/S:   muscle strength 5/5 UEs, 4/5  LEs  NEURO:   Cranial nerves 2-12 are normal tested and grossly at patient's baseline, speech clear  PSYCH:  insight and judgement impaired, memory impaired , affect and mood normal    Labs:   no recent labs    ASSESSMENT/PLAN:  Urinary tract infection without hematuria, site unspecified  Afebrile.  Gen. Weakness. Aggressive behaviors at times  1. Cont. Cipro, await UC results  2. Monitor for fever  3. Cont. Myrbetriq.  4. Encourage fluids    Generalized muscle weakness  May be r/t #1.  Decrease in act. Level.  Refusal to get OOB at times  1. Cbc, bmp next week-has refused labs in past, family will try to be present for lab draw  2. Encourage change in position, get up to w.c.  3. Monitor for skin breakdown  4. Cont. Tylenol for pain    Late onset Alzheimer's disease with behavioral disturbance (H)  Memory loss. Decrease in activity level, aggressive behaviors at times.  Current UTI  1. Complete anbtx course for UTI  2. Cont. celexa  3. Reassess mood, behaviors, once anbtx tx completed for UTI.  For ongoing aggressive behaviors, will discuss starting serqouel with son      Electronically signed by:  JAYLAN Morales CNP                 Sincerely,        JAYLAN Morales CNP

## 2021-06-13 ENCOUNTER — RECORDS - HEALTHEAST (OUTPATIENT)
Dept: LAB | Facility: CLINIC | Age: 81
End: 2021-06-13

## 2021-06-13 ENCOUNTER — TELEPHONE (OUTPATIENT)
Dept: GERIATRICS | Facility: CLINIC | Age: 81
End: 2021-06-13

## 2021-06-15 LAB
ANION GAP SERPL CALCULATED.3IONS-SCNC: 8 MMOL/L (ref 5–18)
BASOPHILS # BLD AUTO: 0.1 THOU/UL (ref 0–0.2)
BASOPHILS NFR BLD AUTO: 1 % (ref 0–2)
BUN SERPL-MCNC: 26 MG/DL (ref 8–28)
CALCIUM SERPL-MCNC: 9.1 MG/DL (ref 8.5–10.5)
CHLORIDE BLD-SCNC: 109 MMOL/L (ref 98–107)
CO2 SERPL-SCNC: 23 MMOL/L (ref 22–31)
CREAT SERPL-MCNC: 1.15 MG/DL (ref 0.6–1.1)
EOSINOPHIL # BLD AUTO: 0.1 THOU/UL (ref 0–0.4)
EOSINOPHIL NFR BLD AUTO: 2 % (ref 0–6)
ERYTHROCYTE [DISTWIDTH] IN BLOOD BY AUTOMATED COUNT: 12.4 % (ref 11–14.5)
GFR SERPL CREATININE-BSD FRML MDRD: 45 ML/MIN/1.73M2
GLUCOSE BLD-MCNC: 66 MG/DL (ref 70–125)
HCT VFR BLD AUTO: 32.3 % (ref 35–47)
HGB BLD-MCNC: 10.9 G/DL (ref 12–16)
IMM GRANULOCYTES # BLD: 0 THOU/UL
IMM GRANULOCYTES NFR BLD: 0 %
LYMPHOCYTES # BLD AUTO: 1.3 THOU/UL (ref 0.8–4.4)
LYMPHOCYTES NFR BLD AUTO: 21 % (ref 20–40)
MCH RBC QN AUTO: 31.4 PG (ref 27–34)
MCHC RBC AUTO-ENTMCNC: 33.7 G/DL (ref 32–36)
MCV RBC AUTO: 93 FL (ref 80–100)
MONOCYTES # BLD AUTO: 0.5 THOU/UL (ref 0–0.9)
MONOCYTES NFR BLD AUTO: 8 % (ref 2–10)
NEUTROPHILS # BLD AUTO: 4.5 THOU/UL (ref 2–7.7)
NEUTROPHILS NFR BLD AUTO: 68 % (ref 50–70)
PLATELET # BLD AUTO: 209 THOU/UL (ref 140–440)
PMV BLD AUTO: 10.5 FL (ref 8.5–12.5)
POTASSIUM BLD-SCNC: 4.4 MMOL/L (ref 3.5–5)
RBC # BLD AUTO: 3.47 MILL/UL (ref 3.8–5.4)
SODIUM SERPL-SCNC: 140 MMOL/L (ref 136–145)
WBC: 6.5 THOU/UL (ref 4–11)

## 2021-06-16 ENCOUNTER — ASSISTED LIVING VISIT (OUTPATIENT)
Dept: GERIATRICS | Facility: CLINIC | Age: 81
End: 2021-06-16
Payer: COMMERCIAL

## 2021-06-16 VITALS
DIASTOLIC BLOOD PRESSURE: 67 MMHG | HEART RATE: 66 BPM | BODY MASS INDEX: 26.46 KG/M2 | RESPIRATION RATE: 18 BRPM | OXYGEN SATURATION: 94 % | SYSTOLIC BLOOD PRESSURE: 120 MMHG | WEIGHT: 131 LBS

## 2021-06-16 DIAGNOSIS — F02.818 LATE ONSET ALZHEIMER'S DISEASE WITH BEHAVIORAL DISTURBANCE (H): ICD-10-CM

## 2021-06-16 DIAGNOSIS — G30.1 LATE ONSET ALZHEIMER'S DISEASE WITH BEHAVIORAL DISTURBANCE (H): ICD-10-CM

## 2021-06-16 DIAGNOSIS — I10 ESSENTIAL HYPERTENSION: ICD-10-CM

## 2021-06-16 DIAGNOSIS — N39.0 URINARY TRACT INFECTION WITHOUT HEMATURIA, SITE UNSPECIFIED: Primary | ICD-10-CM

## 2021-06-16 RX ORDER — MIRTAZAPINE 7.5 MG/1
7.5 TABLET, FILM COATED ORAL AT BEDTIME
COMMUNITY
End: 2021-07-09

## 2021-06-16 NOTE — LETTER
6/16/2021        RE: Judith M Trierweiler  [address]  [place] [address]        [place] [facility]  [place] [facility] Record Number:  [id number]  Place of Service where encounter took place:  [facility]  [employer] ([facility]) [[id number]]  Chief Complaint   Patient presents with     UTI       HPI:    Judith M Trierweiler  is a 80 year old (1940), who is being seen today for an episodic care visit.  HPI information obtained from: facility chart records, facility staff, patient report, [facility] chart review and family/first contact son report. Today's concern is:  UTI, alzheimer's, HTN. Has h/o recurrent UTIs.  Cont. On myrbetriq. UA +. Started on cipro.  Has had increased confusion at times, increased aggressive behaviors towards staff at times. No improvement in behaviors since cipro started. Has memory loss due to alzheimer's. Isolative at times, malaise. Refuses to get out of bed freq, becomes agitated with staff.  Cont. On celexa. Not taking other psych meds. Has ongoing decrease in po intake.  For HTN taking norvasc, lisinopril, metoprolol.  BPs, HRs stable.  6/15/21 cbc, bmp stable.     Past Medical and Surgical History reviewed in Epic today.    MEDICATIONS:          REVIEW OF SYSTEMS:  Unobtainable secondary to cognitive impairment. Reports feeling ok today    Objective:  /67   Pulse 66   Resp 18   Wt 59.4 kg (131 lb)   SpO2 94%   BMI 26.46 kg/m    Exam:  GENERAL APPEARANCE:  Alert, in no distress  ENT:  Mouth and posterior oropharynx normal, moist mucous membranes, [place]  EYES:  EOM, conjunctivae, lids, pupils and irises normal, PERRL  RESP:  respiratory effort and palpation of chest normal, lungs clear to auscultation , no respiratory distress  CV:  Palpation and auscultation of heart done , regular rate and rhythm, no murmur, rub, or gallop, peripheral edema trace+ in LEs  ABDOMEN:  normal bowel sounds, soft, nontender, no hepatosplenomegaly or other masses, no  guarding or rebound  M/S:   muscle strength 5/5UEs. 4/5 LEs  NEURO:   Cranial nerves 2-12 are normal tested and grossly at patient's baseline, speech clear  PSYCH:  insight and judgement impaired, memory impaired , affect abnormal -flat    Labs:     Most Recent 3 CBC's:  Recent Labs   Lab Test 02/17/20 01/07/20   WBC 7.8  --    HGB 11.2* 11.5*   MCV 98  --      --      Most Recent 3 BMP's:  Recent Labs   Lab Test 09/15/20 03/03/20 02/17/20    136 144   POTASSIUM 4.7 4.4 4.9   CHLORIDE 110* 105 110*   CO2 21* 24 25   BUN 40* 35* 37*   CR 1.34* 1.20* 1.47*   ANIONGAP 9 7 9   SLIME 9.5 9.1 9.2   GLC 63* 67* 87       ASSESSMENT/PLAN:  Urinary tract infection without hematuria, site unspecified  Afebrile, ongoing decreased act. Level. No current dysuria  1. Complete cipro course  2. Cont. myrbetriq  3. Monitor for fever, dysuria  4. Encourage fluids    Late onset Alzheimer's disease with behavioral disturbance (H)  Memory loss, malaise, ongoing decreased po intake  1. Discontinue celexa  2. Start remeron 7.5 mg at bedtime  3. Follow wt.s, po intake  4. Reassess mood, behaviors over next couple weeks  5. For ongoing aggressive behaviors may consider seroquel  6. Did discuss Hospice with son for ongoing gen. Decline      Essential hypertension  BPs, HRs stable  1. Cont. Norvasc, lisinopril, metoprolol  2. Follow BPs, HRs  3. Encourage fluids as above  4. For lower BPs, may decrease lisinopril dose      Electronically signed by:  JAYLAN Morales CNP                 Sincerely,        AJYLAN Morales CNP

## 2021-06-16 NOTE — PROGRESS NOTES
[place] [facility]  [place] [facility] Record Number:  [id number]  Place of Service where encounter took place:  [facility]  [facility] ([facility]) [[id number]]  Chief Complaint   Patient presents with     UTI       HPI:    Judith M Trierweiler  is a 80 year old (1940), who is being seen today for an episodic care visit.  HPI information obtained from: facility chart records, facility staff, patient report, [facility] chart review and family/first contact son report. Today's concern is:  UTI, alzheimer's, HTN. Has h/o recurrent UTIs.  Cont. On myrbetriq. UA +. Started on cipro.  Has had increased confusion at times, increased aggressive behaviors towards staff at times. No improvement in behaviors since cipro started. Has memory loss due to alzheimer's. Isolative at times, malaise. Refuses to get out of bed freq, becomes agitated with staff.  Cont. On celexa. Not taking other psych meds. Has ongoing decrease in po intake.  For HTN taking norvasc, lisinopril, metoprolol.  BPs, HRs stable.  6/15/21 cbc, bmp stable.     Past Medical and Surgical History reviewed in Epic today.    MEDICATIONS:          REVIEW OF SYSTEMS:  Unobtainable secondary to cognitive impairment. Reports feeling ok today    Objective:  /67   Pulse 66   Resp 18   Wt 59.4 kg (131 lb)   SpO2 94%   BMI 26.46 kg/m    Exam:  GENERAL APPEARANCE:  Alert, in no distress  ENT:  Mouth and posterior oropharynx normal, moist mucous membranes, [place]  EYES:  EOM, conjunctivae, lids, pupils and irises normal, PERRL  RESP:  respiratory effort and palpation of chest normal, lungs clear to auscultation , no respiratory distress  CV:  Palpation and auscultation of heart done , regular rate and rhythm, no murmur, rub, or gallop, peripheral edema trace+ in LEs  ABDOMEN:  normal bowel sounds, soft, nontender, no hepatosplenomegaly or other masses, no guarding or rebound  M/S:   muscle strength 5/5UEs. 4/5 LEs  NEURO:   Cranial nerves 2-12  are normal tested and grossly at patient's baseline, speech clear  PSYCH:  insight and judgement impaired, memory impaired , affect abnormal -flat    Labs:     Most Recent 3 CBC's:  Recent Labs   Lab Test 02/17/20 01/07/20   WBC 7.8  --    HGB 11.2* 11.5*   MCV 98  --      --      Most Recent 3 BMP's:  Recent Labs   Lab Test 09/15/20 03/03/20 02/17/20    136 144   POTASSIUM 4.7 4.4 4.9   CHLORIDE 110* 105 110*   CO2 21* 24 25   BUN 40* 35* 37*   CR 1.34* 1.20* 1.47*   ANIONGAP 9 7 9   SLIME 9.5 9.1 9.2   GLC 63* 67* 87       ASSESSMENT/PLAN:  Urinary tract infection without hematuria, site unspecified  Afebrile, ongoing decreased act. Level. No current dysuria  1. Complete cipro course  2. Cont. myrbetriq  3. Monitor for fever, dysuria  4. Encourage fluids    Late onset Alzheimer's disease with behavioral disturbance (H)  Memory loss, malaise, ongoing decreased po intake  1. Discontinue celexa  2. Start remeron 7.5 mg at bedtime  3. Follow wt.s, po intake  4. Reassess mood, behaviors over next couple weeks  5. For ongoing aggressive behaviors may consider seroquel  6. Did discuss Hospice with son for ongoing gen. Decline      Essential hypertension  BPs, HRs stable  1. Cont. Norvasc, lisinopril, metoprolol  2. Follow BPs, HRs  3. Encourage fluids as above  4. For lower BPs, may decrease lisinopril dose      Electronically signed by:  JAYLAN Morales CNP

## 2021-06-18 ENCOUNTER — TELEPHONE (OUTPATIENT)
Dept: GERIATRICS | Facility: CLINIC | Age: 81
End: 2021-06-18

## 2021-06-18 DIAGNOSIS — F41.9 ANXIETY: Primary | ICD-10-CM

## 2021-06-18 RX ORDER — LORAZEPAM 0.5 MG/1
0.25 TABLET ORAL 2 TIMES DAILY PRN
Qty: 15 TABLET | Refills: 5 | Status: SHIPPED | OUTPATIENT
Start: 2021-06-18 | End: 2021-11-08

## 2021-07-02 ENCOUNTER — ASSISTED LIVING VISIT (OUTPATIENT)
Dept: GERIATRICS | Facility: CLINIC | Age: 81
End: 2021-07-02
Payer: COMMERCIAL

## 2021-07-02 ENCOUNTER — RECORDS - HEALTHEAST (OUTPATIENT)
Dept: LAB | Facility: CLINIC | Age: 81
End: 2021-07-02

## 2021-07-02 VITALS — RESPIRATION RATE: 16 BRPM | TEMPERATURE: 97.9 F | HEART RATE: 78 BPM

## 2021-07-02 DIAGNOSIS — G30.1 LATE ONSET ALZHEIMER'S DISEASE WITH BEHAVIORAL DISTURBANCE (H): ICD-10-CM

## 2021-07-02 DIAGNOSIS — F02.818 LATE ONSET ALZHEIMER'S DISEASE WITH BEHAVIORAL DISTURBANCE (H): ICD-10-CM

## 2021-07-02 DIAGNOSIS — M25.561 ACUTE PAIN OF RIGHT KNEE: Primary | ICD-10-CM

## 2021-07-02 LAB
ALBUMIN UR-MCNC: ABNORMAL G/DL
APPEARANCE UR: CLEAR
BACTERIA #/AREA URNS HPF: ABNORMAL /[HPF]
BILIRUB UR QL STRIP: NEGATIVE
COLOR UR AUTO: ABNORMAL
GLUCOSE UR STRIP-MCNC: NEGATIVE MG/DL
HGB UR QL STRIP: NEGATIVE
KETONES UR STRIP-MCNC: NEGATIVE MG/DL
LEUKOCYTE ESTERASE UR QL STRIP: ABNORMAL
NITRATE UR QL: NEGATIVE
PH UR STRIP: 5 [PH] (ref 5–8)
RBC URINE: 2 HPF
SP GR UR STRIP: 1.02 (ref 1–1.03)
SQUAMOUS EPITHELIAL: 2 /HPF
TRANSITIONAL EPI: 1 /HPF
UROBILINOGEN UR STRIP-ACNC: ABNORMAL
WBC URINE: 38 HPF

## 2021-07-02 NOTE — PROGRESS NOTES
[facility] GERIATRIC SERVICES  [place] Medical Record Number: [id number]  [facility]  [email or web address] ([facility]) [[id number]]  Chief Complaint   Patient presents with     Pain     HPI:    Judith M Trierweiler is a 80 year old (1940), who is being seen today for an episodic care visit. HPI information obtained from: facility chart records, facility staff, patient report, [facility] chart review and family/first contact dtr report. Today's concern is: R knee pain, alzheimer's. Has had several falls over past week. Attempts to self-transfer. Reports some R knee pain today. Faint bruise to R knee. Has trace edema to RLE-per staff, was worse this am.  No redness of LEs. For alzheimer's cont. On remeron, prn ativan. Has ongoing decrease in po intake. Aggressive behaviors with staff during cares.       Past Medical and Surgical History reviewed in Epic today.    MEDICATIONS:    REVIEW OF SYSTEMS:  Unobtainable secondary to cognitive impairment. Reports R knee pain    Objective:  Pulse 78   Temp 97.9  F (36.6  C)   Resp 16   Exam:  GENERAL APPEARANCE:  Alert, in no distress  ENT:  Mouth and posterior oropharynx normal, moist mucous membranes, [place]  EYES:  EOM, conjunctivae, lids, pupils and irises normal, PERRL  NECK:  FROM  RESP:  respiratory effort and palpation of chest normal, lungs clear to auscultation , no respiratory distress  CV:  Palpation and auscultation of heart done , regular rate and rhythm, no murmur, rub, or gallop, peripheral edema trace+ in RLE  ABDOMEN:  normal bowel sounds, soft, nontender, no hepatosplenomegaly or other masses, no guarding or rebound  M/S:   muscle strength 5/5 UEs, some gen. LE weakness  NEURO:   Cranial nerves 2-12 are normal tested and grossly at patient's baseline, speech clear  PSYCH:  insight and judgement impaired, memory impaired , affect and mood normal    Labs:     Most Recent 3 CBC's:  Recent Labs   Lab Test  02/17/20 01/07/20   WBC 7.8  --    HGB 11.2* 11.5*   MCV 98  --      --      Most Recent 3 BMP's:  Recent Labs   Lab Test 09/15/20 03/03/20 02/17/20    136 144   POTASSIUM 4.7 4.4 4.9   CHLORIDE 110* 105 110*   CO2 21* 24 25   BUN 40* 35* 37*   CR 1.34* 1.20* 1.47*   ANIONGAP 9 7 9   SLIME 9.5 9.1 9.2   GLC 63* 67* 87       ASSESSMENT/PLAN:  Acute pain of right knee  Newer onset. Has had several falls over past week. Sl bruise to R knee  1. XR R knee  2. Cont. Tylenol  3. For ongoing R knee pain, may start aspercreme to site bid  4. Monitor for further increased LE weakness, difficulty with transfers    Late onset Alzheimer's disease with behavioral disturbance (H)  Memory loss. Aggressive behaviors towards staff with cares  1. Cont. remeron  2. Cont. Prn ativan  3. Hospice eval in next couple days  4. For ongoing aggressive behaviors consider haldol, HBR gel due to refusal to take meds at times      Electronically signed by:  JAYLAN Morales CNP

## 2021-07-02 NOTE — LETTER
7/2/2021        RE: Judith M Trierweiler  [address] [place] [address]        [facility] GERIATRIC SERVICES  [place] Medical Record Number: [id number]  [facility]  [employer] ([facility]) [[id number]]  Chief Complaint   Patient presents with     Pain     HPI:    Judith M Trierweiler is a 80 year old (1940), who is being seen today for an episodic care visit. HPI information obtained from: facility chart records, facility staff, patient report, [facility] chart review and family/first contact dtr report. Today's concern is: R knee pain, alzheimer's. Has had several falls over past week. Attempts to self-transfer. Reports some R knee pain today. Faint bruise to R knee. Has trace edema to RLE-per staff, was worse this am.  No redness of LEs. For alzheimer's cont. On remeron, prn ativan. Has ongoing decrease in po intake. Aggressive behaviors with staff during cares.       Past Medical and Surgical History reviewed in Epic today.    MEDICATIONS:    REVIEW OF SYSTEMS:  Unobtainable secondary to cognitive impairment. Reports R knee pain    Objective:  Pulse 78   Temp 97.9  F (36.6  C)   Resp 16   Exam:  GENERAL APPEARANCE:  Alert, in no distress  ENT:  Mouth and posterior oropharynx normal, moist mucous membranes, [place]  EYES:  EOM, conjunctivae, lids, pupils and irises normal, PERRL  NECK:  FROM  RESP:  respiratory effort and palpation of chest normal, lungs clear to auscultation , no respiratory distress  CV:  Palpation and auscultation of heart done , regular rate and rhythm, no murmur, rub, or gallop, peripheral edema trace+ in RLE  ABDOMEN:  normal bowel sounds, soft, nontender, no hepatosplenomegaly or other masses, no guarding or rebound  M/S:   muscle strength 5/5 UEs, some gen. LE weakness  NEURO:   Cranial nerves 2-12 are normal tested and grossly at patient's baseline, speech clear  PSYCH:  insight and judgement impaired, memory  impaired , affect and mood normal    Labs:     Most Recent 3 CBC's:  Recent Labs   Lab Test 02/17/20 01/07/20   WBC 7.8  --    HGB 11.2* 11.5*   MCV 98  --      --      Most Recent 3 BMP's:  Recent Labs   Lab Test 09/15/20 03/03/20 02/17/20    136 144   POTASSIUM 4.7 4.4 4.9   CHLORIDE 110* 105 110*   CO2 21* 24 25   BUN 40* 35* 37*   CR 1.34* 1.20* 1.47*   ANIONGAP 9 7 9   SLIME 9.5 9.1 9.2   GLC 63* 67* 87       ASSESSMENT/PLAN:  Acute pain of right knee  Newer onset. Has had several falls over past week. Sl bruise to R knee  1. XR R knee  2. Cont. Tylenol  3. For ongoing R knee pain, may start aspercreme to site bid  4. Monitor for further increased LE weakness, difficulty with transfers    Late onset Alzheimer's disease with behavioral disturbance (H)  Memory loss. Aggressive behaviors towards staff with cares  1. Cont. remeron  2. Cont. Prn ativan  3. Hospice eval in next couple days  4. For ongoing aggressive behaviors consider haldol, HBR gel due to refusal to take meds at times      Electronically signed by:  JAYLAN Morales CNP           Sincerely,        JAYLAN Morales CNP

## 2021-07-03 LAB — BACTERIA SPEC CULT: NO GROWTH

## 2021-07-04 ENCOUNTER — TELEPHONE (OUTPATIENT)
Dept: GERIATRICS | Facility: CLINIC | Age: 81
End: 2021-07-04

## 2021-07-08 DIAGNOSIS — F41.9 ANXIETY: Primary | ICD-10-CM

## 2021-07-09 RX ORDER — MIRTAZAPINE 7.5 MG/1
TABLET, FILM COATED ORAL
Qty: 28 TABLET | Status: SHIPPED | OUTPATIENT
Start: 2021-07-09 | End: 2022-08-10

## 2021-07-14 ENCOUNTER — ASSISTED LIVING VISIT (OUTPATIENT)
Dept: GERIATRICS | Facility: CLINIC | Age: 81
End: 2021-07-14
Payer: COMMERCIAL

## 2021-07-14 VITALS
OXYGEN SATURATION: 92 % | WEIGHT: 132 LBS | DIASTOLIC BLOOD PRESSURE: 57 MMHG | SYSTOLIC BLOOD PRESSURE: 109 MMHG | HEART RATE: 71 BPM | RESPIRATION RATE: 18 BRPM | BODY MASS INDEX: 26.66 KG/M2

## 2021-07-14 DIAGNOSIS — F02.818 LATE ONSET ALZHEIMER'S DISEASE WITH BEHAVIORAL DISTURBANCE (H): ICD-10-CM

## 2021-07-14 DIAGNOSIS — G30.1 LATE ONSET ALZHEIMER'S DISEASE WITH BEHAVIORAL DISTURBANCE (H): ICD-10-CM

## 2021-07-14 DIAGNOSIS — M62.81 GENERALIZED MUSCLE WEAKNESS: ICD-10-CM

## 2021-07-14 DIAGNOSIS — R52 PAIN: Primary | ICD-10-CM

## 2021-07-14 NOTE — Clinical Note
[facility] Geriatric Services  Health Care Home  Patient Care Plan  About Me  Patient Name:  Judith M Trierweiler    YOB: 1940  Age:   80 year old   [facility] MRN: [id number] Telephone Information:   Home Phone [phone]   Mobile [phone]       Address:    [address] [place] [address] Email address:  [email or web address]      Emergency Contact(s)  Name Relationship Lgl Grd Work Phone Home Phone Mobile Phone   1. JOHN TRIERWEIL* Son   [phone]    2. SARWAT CARRENO Daughter   [phone]    3. TATIANA PRAKASH* Spouse   [phone]            Primary language:  English     needed?     [facility]:  [phone] op. 1  Other communication barriers:    Current living arrangement:    Mobility Status/ Medical Equipment:    Other information to know about me:      Health Maintenance  Immunizations:     Cancer Screening:       My Access Plan  Medical Emergency 911   Primary Clinic Line     24/7 After Hours Line [phone]   Preferred Hospital     Preferred Pharmacy [facility] - [place], [place] - [address] [place] [address]     Behavioral Health Crisis Line Crisis Connection, [phone] or 911     My Care Team Members  Patient Care Team       Relationship Specialty Notifications Start End    Rylan Farris APRN CNP PCP - General Nurse Practitioner - Gerontology All results, Admissions 12/5/19     Phone: [phone] Fax: [phone]         [address] [facility] [address] [facility] [address]    Rylan Farris APRN CNP Assigned PCP   11/15/19     Phone: [phone] Fax: [phone]         [address] [place] [address] [facility] [address]    Vale Lawrence MD MD Internal Medicine  12/5/19     Phone: [phone] Fax: [phone]         [address] [place] [address] [place] [address] [facility] [address]    Helga Hopper Nursing Assistant Gerontology All results, Admissions 12/5/19     NP & MD Geriatric Services     Jose Carvalho MD MD  Urology  1/6/20     Phone: [phone] Fax: [phone]         [address] [place] [address] [facility] [address]    Rhina Owen, RN Specialty Care Coordinator Urology  1/6/20     Phone: [phone] Pager: [phone]        Zager, Marlee Paige, CNP Nurse Practitioner Nurse Practitioner  2/27/20     Phone: [phone] Fax: [phone] [address] [facility] [address]    Pam Brown LSW  Primary Care - [facility] Admissions 1/11/21     [employer] Medicare Plans    Phone: [phone] Fax: [phone]         [address] [facility] [address]    Mari Villalobos Case Management Specialist Primary Care - [facility]  1/11/21      [facility] Geriatric Services [address]    Kelsie Short Case Management Specialist Primary Care - [facility]  1/11/21     Phone: [phone]         Marlee Archibald CNP Assigned Surgical Provider   6/13/21     Phone: [phone] Fax: [phone] [address] [facility] [address]           My Care Plans  Self Management and Treatment Plan      Action Plans on File:    Advance Care Plans/Directives on file:                My Medical and Care Information  Problem List   Patient Active Problem List   Diagnosis    Late onset Alzheimer's disease without behavioral disturbance (H)    Unsteady gait    Primary osteoarthritis involving multiple joints    PUD (peptic ulcer disease)    Chronic kidney disease, stage 3      Current Medications and Allergies:  See printed Medication Report.    Health Care Home Complexity Tier:      Care Coordination Start Date:     Form Last Updated: 07/14/2021

## 2021-07-14 NOTE — LETTER
7/14/2021        RE: Judith M Trierweiler  [address] [place] [address]        [facility] GERIATRIC SERVICES  [place] Medical Record Number:  [id number]  [facility]  [employer] ([facility]) [[id number]]  Chief Complaint   Patient presents with     Fatigue     Dementia       HPI:    Judith M Trierweiler  is a 80 year old (1940), who is being seen today for an episodic care visit.  HPI information obtained from: facility chart records, facility staff, patient report and [facility] chart review. Today's concern is: pain, weakness, alzheimer's. Had recent c/o R knee pain.  Had small bruise to area. XR done neg for acute changes.  Today reports low back pain at times.  Cont. On tylenol, aspercreme.  Requires 2 assist with stand/pivot transfers. Ongoing gen. LE weakness.  H/o falls with attempts to self-transfer.  Completed PT course. Has memory loss due to alzheimer's.  Variable po intake. Delusions at times. Aggressive towards staff with cares.  Cont. On remeron, prn ativan. Per staff, prn ativan not typically effective. 7/4/21 UA neg. Had recent hospice eval.  Did not meet criteria.       Past Medical and Surgical History reviewed in Epic today.    MEDICATIONS:          REVIEW OF SYSTEMS:  Unobtainable secondary to cognitive impairment. Reports occ low back pain.     Objective:  /57   Pulse 71   Resp 18   Wt 59.9 kg (132 lb)   SpO2 92%   BMI 26.66 kg/m    Exam:  GENERAL APPEARANCE:  Alert, in no distress, cooperative  ENT:  Mouth and posterior oropharynx normal, moist mucous membranes, [place]  EYES:  EOM, conjunctivae, lids, pupils and irises normal, PERRL  NECK:  No adenopathy,masses or thyromegaly, no carotid bruit  RESP:  respiratory effort and palpation of chest normal, lungs clear to auscultation , no respiratory distress, diminished breath sounds bibasilar  CV:  Palpation and auscultation of heart done , regular rate and rhythm, no  murmur, rub, or gallop, no edema  ABDOMEN:  normal bowel sounds, soft, nontender, no hepatosplenomegaly or other masses, no guarding or rebound  M/S:   muscle strength 5/5 UEs, 3/5 LEs.  no muscle contractures. 2 assist to stand from seated position  NEURO:   Cranial nerves 2-12 are normal tested and grossly at patient's baseline, speech clear  PSYCH:  insight and judgement impaired, memory impaired , affect and mood normal, delusions present    Labs:     Most Recent 3 CBC's:Recent Labs   Lab Test 11/19/20  1455 09/15/20  0750 07/14/20  0923   WBC 9.1 7.2 7.1   HGB 10.8* 10.3* 10.7*   MCV 95 98 99    214 211     Most Recent 3 BMP's:Recent Labs   Lab Test 11/19/20  1455 09/15/20  0750 09/15/20  0000    140 140   POTASSIUM 4.3 4.7 4.7   CHLORIDE 105 110* 110*   CO2 24 21* 21*   BUN 38* 40* 40*   CR 1.84* 1.34* 1.34*   ANIONGAP 10 9 9   SLIME 9.6 9.5 9.5    63* 63*       ASSESSMENT/PLAN:  Pain  R knee pain resolved, occ chronic low back pain  1. Cont. Tylenol  2. Cont. aspercreme  3. Encourage to sit up more in chair throughout the day  4. For further increased back pain, consider voltaren gel    Generalized muscle weakness  Ongoing. No longer amb. 2 assist with transfers  1. Cont. Stand/pivot transfers with 2 assist  2. Encourage to foot propel while in w.c.  3. Monitor for further difficulty with transfers  4. 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 Delusions, ongoing aggressive behaviors towards staff with cares. Occ. Med refusal.  Decreased act. Level during the day. Variable po intake  1. Left vm for son.  Will discuss Hospice referral from alternative agency  2. Cont. Remeron, prn ativan  3. Will discuss starting seroquel for aggressive behaviors, delusions  4. Follow wt.s, po intake      Electronically signed by:  JAYLAN Morales CNP                 Sincerely,        JAYLAN Morales CNP

## 2021-07-14 NOTE — PROGRESS NOTES
[place] GERIATRIC SERVICES  [place] Medical Record Number:  [id number]  Place of Service where encounter took place:  [facility]  [facility] ([facility]) [[id number]]  Chief Complaint   Patient presents with     Fatigue     Dementia       HPI:    Judith M Trierweiler  is a 80 year old (1940), who is being seen today for an episodic care visit.  HPI information obtained from: facility chart records, facility staff, patient report and [facility] chart review. Today's concern is: pain, weakness, alzheimer's. Had recent c/o R knee pain.  Had small bruise to area. XR done neg for acute changes.  Today reports low back pain at times.  Cont. On tylenol, aspercreme.  Requires 2 assist with stand/pivot transfers. Ongoing gen. LE weakness.  H/o falls with attempts to self-transfer.  Completed PT course. Has memory loss due to alzheimer's.  Variable po intake. Delusions at times. Aggressive towards staff with cares.  Cont. On remeron, prn ativan. Per staff, prn ativan not typically effective. 7/4/21 UA neg. Had recent hospice eval.  Did not meet criteria.       Past Medical and Surgical History reviewed in Epic today.    MEDICATIONS:          REVIEW OF SYSTEMS:  Unobtainable secondary to cognitive impairment. Reports occ low back pain.     Objective:  /57   Pulse 71   Resp 18   Wt 59.9 kg (132 lb)   SpO2 92%   BMI 26.66 kg/m    Exam:  GENERAL APPEARANCE:  Alert, in no distress, cooperative  ENT:  Mouth and posterior oropharynx normal, moist mucous membranes, [place]  EYES:  EOM, conjunctivae, lids, pupils and irises normal, PERRL  NECK:  No adenopathy,masses or thyromegaly, no carotid bruit  RESP:  respiratory effort and palpation of chest normal, lungs clear to auscultation , no respiratory distress, diminished breath sounds bibasilar  CV:  Palpation and auscultation of heart done , regular rate and rhythm, no murmur, rub, or gallop, no edema  ABDOMEN:  normal bowel sounds, soft, nontender, no  hepatosplenomegaly or other masses, no guarding or rebound  M/S:   muscle strength 5/5 UEs, 3/5 LEs.  no muscle contractures. 2 assist to stand from seated position  NEURO:   Cranial nerves 2-12 are normal tested and grossly at patient's baseline, speech clear  PSYCH:  insight and judgement impaired, memory impaired , affect and mood normal, delusions present    Labs:     Most Recent 3 CBC's:Recent Labs   Lab Test 11/19/20  1455 09/15/20  0750 07/14/20  0923   WBC 9.1 7.2 7.1   HGB 10.8* 10.3* 10.7*   MCV 95 98 99    214 211     Most Recent 3 BMP's:Recent Labs   Lab Test 11/19/20  1455 09/15/20  0750 09/15/20  0000    140 140   POTASSIUM 4.3 4.7 4.7   CHLORIDE 105 110* 110*   CO2 24 21* 21*   BUN 38* 40* 40*   CR 1.84* 1.34* 1.34*   ANIONGAP 10 9 9   SLIME 9.6 9.5 9.5    63* 63*       ASSESSMENT/PLAN:  Pain  R knee pain resolved, occ chronic low back pain  1. Cont. Tylenol  2. Cont. aspercreme  3. Encourage to sit up more in chair throughout the day  4. For further increased back pain, consider voltaren gel    Generalized muscle weakness  Ongoing. No longer amb. 2 assist with transfers  1. Cont. Stand/pivot transfers with 2 assist  2. Encourage to foot propel while in w.c.  3. Monitor for further difficulty with transfers  4. 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 Delusions, ongoing aggressive behaviors towards staff with cares. Occ. Med refusal.  Decreased act. Level during the day. Variable po intake  1. Left vm for son.  Will discuss Hospice referral from alternative agency  2. Cont. Remeron, prn ativan  3. Will discuss starting seroquel for aggressive behaviors, delusions  4. Follow wt.s, po intake      Electronically signed by:  JAYLAN Morales CNP

## 2021-08-18 ENCOUNTER — HOSPITAL ENCOUNTER (EMERGENCY)
Facility: CLINIC | Age: 81
Discharge: NURSING, REHAB OR LONG-TERM CARE | End: 2021-08-18
Attending: EMERGENCY MEDICINE | Admitting: EMERGENCY MEDICINE
Payer: COMMERCIAL

## 2021-08-18 ENCOUNTER — APPOINTMENT (OUTPATIENT)
Dept: CT IMAGING | Facility: CLINIC | Age: 81
End: 2021-08-18
Attending: EMERGENCY MEDICINE
Payer: COMMERCIAL

## 2021-08-18 VITALS
TEMPERATURE: 98 F | RESPIRATION RATE: 18 BRPM | HEART RATE: 58 BPM | DIASTOLIC BLOOD PRESSURE: 68 MMHG | SYSTOLIC BLOOD PRESSURE: 128 MMHG | OXYGEN SATURATION: 99 %
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
## 2021-08-18 LAB
ALBUMIN UR-MCNC: NEGATIVE MG/DL
ANION GAP SERPL CALCULATED.3IONS-SCNC: 7 MMOL/L (ref 3–14)
APPEARANCE UR: CLEAR
ATRIAL RATE - MUSE: 56 BPM
BASOPHILS # BLD AUTO: 0.1 10E3/UL (ref 0–0.2)
BASOPHILS NFR BLD AUTO: 1 %
BILIRUB UR QL STRIP: NEGATIVE
BUN SERPL-MCNC: 40 MG/DL (ref 7–30)
CALCIUM SERPL-MCNC: 9.7 MG/DL (ref 8.5–10.1)
CHLORIDE BLD-SCNC: 110 MMOL/L (ref 94–109)
CO2 SERPL-SCNC: 23 MMOL/L (ref 20–32)
COLOR UR AUTO: ABNORMAL
CREAT SERPL-MCNC: 1.46 MG/DL (ref 0.52–1.04)
DIASTOLIC BLOOD PRESSURE - MUSE: NORMAL MMHG
EOSINOPHIL # BLD AUTO: 0.5 10E3/UL (ref 0–0.7)
EOSINOPHIL NFR BLD AUTO: 5 %
ERYTHROCYTE [DISTWIDTH] IN BLOOD BY AUTOMATED COUNT: 13.2 % (ref 10–15)
GFR SERPL CREATININE-BSD FRML MDRD: 33 ML/MIN/1.73M2
GLUCOSE BLD-MCNC: 84 MG/DL (ref 70–99)
GLUCOSE UR STRIP-MCNC: NEGATIVE MG/DL
HCT VFR BLD AUTO: 35.5 % (ref 35–47)
HGB BLD-MCNC: 11.7 G/DL (ref 11.7–15.7)
HGB UR QL STRIP: NEGATIVE
HOLD SPECIMEN: NORMAL
IMM GRANULOCYTES # BLD: 0 10E3/UL
IMM GRANULOCYTES NFR BLD: 1 %
INTERPRETATION ECG - MUSE: NORMAL
KETONES UR STRIP-MCNC: NEGATIVE MG/DL
LEUKOCYTE ESTERASE UR QL STRIP: ABNORMAL
LYMPHOCYTES # BLD AUTO: 1.6 10E3/UL (ref 0.8–5.3)
LYMPHOCYTES NFR BLD AUTO: 19 %
MCH RBC QN AUTO: 31.8 PG (ref 26.5–33)
MCHC RBC AUTO-ENTMCNC: 33 G/DL (ref 31.5–36.5)
MCV RBC AUTO: 97 FL (ref 78–100)
MONOCYTES # BLD AUTO: 0.7 10E3/UL (ref 0–1.3)
MONOCYTES NFR BLD AUTO: 8 %
NEUTROPHILS # BLD AUTO: 5.8 10E3/UL (ref 1.6–8.3)
NEUTROPHILS NFR BLD AUTO: 66 %
NITRATE UR QL: NEGATIVE
NRBC # BLD AUTO: 0 10E3/UL
NRBC BLD AUTO-RTO: 0 /100
P AXIS - MUSE: 10 DEGREES
PH UR STRIP: 5 [PH] (ref 5–7)
PLATELET # BLD AUTO: 200 10E3/UL (ref 150–450)
POTASSIUM BLD-SCNC: 4.7 MMOL/L (ref 3.4–5.3)
PR INTERVAL - MUSE: 172 MS
QRS DURATION - MUSE: 76 MS
QT - MUSE: 412 MS
QTC - MUSE: 397 MS
R AXIS - MUSE: -39 DEGREES
RBC # BLD AUTO: 3.68 10E6/UL (ref 3.8–5.2)
RBC URINE: <1 /HPF
SODIUM SERPL-SCNC: 140 MMOL/L (ref 133–144)
SP GR UR STRIP: 1.01 (ref 1–1.03)
SYSTOLIC BLOOD PRESSURE - MUSE: NORMAL MMHG
T AXIS - MUSE: 39 DEGREES
TROPONIN I SERPL-MCNC: <0.015 UG/L (ref 0–0.04)
UROBILINOGEN UR STRIP-MCNC: NORMAL MG/DL
VENTRICULAR RATE- MUSE: 56 BPM
WBC # BLD AUTO: 8.6 10E3/UL (ref 4–11)
WBC URINE: 6 /HPF

## 2021-08-18 PROCEDURE — 36415 COLL VENOUS BLD VENIPUNCTURE: CPT | Performed by: EMERGENCY MEDICINE

## 2021-08-18 PROCEDURE — 72125 CT NECK SPINE W/O DYE: CPT

## 2021-08-18 PROCEDURE — 84484 ASSAY OF TROPONIN QUANT: CPT | Performed by: EMERGENCY MEDICINE

## 2021-08-18 PROCEDURE — 70450 CT HEAD/BRAIN W/O DYE: CPT

## 2021-08-18 PROCEDURE — 99285 EMERGENCY DEPT VISIT HI MDM: CPT | Mod: 25

## 2021-08-18 PROCEDURE — 81001 URINALYSIS AUTO W/SCOPE: CPT | Performed by: EMERGENCY MEDICINE

## 2021-08-18 PROCEDURE — 93005 ELECTROCARDIOGRAM TRACING: CPT

## 2021-08-18 PROCEDURE — 85025 COMPLETE CBC W/AUTO DIFF WBC: CPT | Performed by: EMERGENCY MEDICINE

## 2021-08-18 PROCEDURE — 80048 BASIC METABOLIC PNL TOTAL CA: CPT | Performed by: EMERGENCY MEDICINE

## 2021-08-18 NOTE — ED TRIAGE NOTES
Brought in by EMS. A&O to self. History of dementia. Lives in memory care facility. Trying to get out of her room by herself and had an unwitnessed fall. Patient states she guided herself down. Right shoulder pain at first but denies now. ABCs intact.

## 2021-08-18 NOTE — ED NOTES
Spoke with JENNIFER Amanda at [facility] in [place]. Report given. No further questions for this RN at this time.

## 2021-08-18 NOTE — LETTER
August 18, 2021      To Whom It May Concern:      Judith M Trierweiler was seen in our Emergency Department today, 08/18/21.      Sincerely,        JENNIFER De Leon

## 2021-08-18 NOTE — ED PROVIDER NOTES
History   Chief Complaint:  Fall     The history is provided by the patient and the nursing home. History limited by: Dementia.      Judith M Trierweiler is an 80 year old female with history of dementia who presents after a fall. The patient suffered an unwitnessed fall at her memory care facility while trying to get out of her room by herself. She reports that she was able to guide herself to the ground. Staff at her facility say that she was initially complaining of right shoulder pain, which she currently denies. Here in the ED, she says that her head hurts, but is not able to elaborate on details surrounding the fall. She denies hip or shoulder pain.      Review of Systems   Unable to perform ROS: Dementia       Allergies:  Lactose  Penicillins    Medications:  Amlodipine  Estriol  Lisinopril  Loperamide  Ativan  Metoprolol  Remeron  Myrbetriq  Omeprazole    Past Medical History:    Cataract  Cerebral infarction  CKD  Hypertension  Falls frequently   GERD  Gout  Hemorrhoid  History of duodenal ulcer  History of DVT  GI bleed  Hyperlipidemia  Macular degeneration  Psoriasis  Vitreous hemorrhage  Alzheimer's disease     Past Surgical History:    Cataract  Hysterectomy  Cholecystectomy    Family History:    CVD, mother  Hypertension, mother  Kidney disease, mother  CAD, father    Social History:  Arrives via EMS from [facility]  Accompanied by Edgar duong    Physical Exam     Patient Vitals for the past 24 hrs:   BP Temp Temp src Pulse Resp SpO2   08/18/21 1435 128/68 -- -- 58 -- 99 %   08/18/21 1427 (!) 168/88 -- -- -- -- --   08/18/21 1407 -- -- -- -- -- 100 %   08/18/21 1405 -- -- -- -- -- 99 %   08/18/21 1316 -- 98  F (36.7  C) Oral -- -- --   08/18/21 1315 (!) 168/109 -- Oral 68 18 --       Physical Exam  Nursing note and vitals reviewed.  HENT:   Mouth/Throat: Moist mucous membranes.   Eyes: EOMI, nonicteric sclera  Cardiovascular: Normal rate, regular rhythm, no murmurs, rubs, or  gallops  Pulmonary/Chest: Effort normal and breath sounds normal. No respiratory distress. No wheezes. No rales.   Abdominal: Soft. Nontender, nondistended, no guarding or rigidity.   Musculoskeletal:  No pain to palpation of pelvis, normal range of motion of bilateral shoulders, elbows, wrists, hips, knees, ankles.  No midline tenderness to palpation of the C/T/L-spine.  Neurological: Alert. Moves all extremities spontaneously.   Skin: Skin is warm and dry. No rash noted.   Psychiatric: Normal mood and affect.       Emergency Department Course   ECG  ECG taken at 1333, ECG read at 1336  Sinus bradycardia  Left axis deviation  Possible anterior infarct, age undetermined  Abnormal ECG   Rate 56 bpm. IA interval 172 ms. QRS duration 76 ms. QT/QTc 412/397 ms. P-R-T axes 10 -39 39.     Imaging:    Cervical spine CT w/o contrast  Degenerative changes. No evidence for fracture.    MADISON FOX MD      Head CT w/o contrast  1. No evidence for intracranial hemorrhage or any acute process.  2. 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  3. Minimal nonspecific white matter changes.    MADISON FOX MD      Reads per radiology    Laboratory:    CBC: WBC 8.6, HGB 11.7,     BMP: chloride 110 (H), urea nitrogen 40 (H), GFR 33 (L) o/w WNL (Creatinine 1.46 (H))     Troponin (Collected 1327): <0.015    UA with microscopic: leukocyte esterase small (A), wbc urine 6 (H) o/w WNL     Emergency Department Course:    Reviewed:  I reviewed nursing notes, vitals, past medical history and care everywhere    Assessments:  1309 I obtained history and examined the patient as noted above.   1500 I rechecked the patient and explained findings.     Disposition:  The patient was discharged to [facility].       Impression & Plan     Medical Decision Making:  Patient with history of dementia presents to the emergency department after unwitnessed fall at [facility].  Broad differential  considered including infection, metabolic abnormality, head bleed, fracture, versus other etiologies.  Fortunately, emergency department work-up is negative for the above etiologies.  Patient is at her baseline per her son and daughter who are at bedside.  Plan to have her discharged back to memory care.  She should be reassessed by memory care provider either Friday or early next week. She is in stable condition at the time of discharge, indications for return to the ED were discussed as well as follow up. All questions were answered and patient and her son & daughter are in agreement with the plan.        Diagnosis:    ICD-10-CM    1. Fall, initial encounter  W19.XXXA      Scribe Disclosure:  I, Cullen Webb, am serving as a scribe at 1:09 PM on 8/18/2021 to document services personally performed by Geovanny Michaels MD, based on my observations and the provider's statements to me.              Geovanny Michaels MD  08/18/21 0796

## 2021-08-19 ENCOUNTER — ASSISTED LIVING VISIT (OUTPATIENT)
Dept: GERIATRICS | Facility: CLINIC | Age: 81
End: 2021-08-19
Payer: COMMERCIAL

## 2021-08-19 VITALS
OXYGEN SATURATION: 96 % | RESPIRATION RATE: 18 BRPM | SYSTOLIC BLOOD PRESSURE: 125 MMHG | DIASTOLIC BLOOD PRESSURE: 77 MMHG | HEART RATE: 62 BPM
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
## 2021-08-19 DIAGNOSIS — F02.818 LATE ONSET ALZHEIMER'S DISEASE WITH BEHAVIORAL DISTURBANCE (H): ICD-10-CM

## 2021-08-19 DIAGNOSIS — I10 ESSENTIAL HYPERTENSION: ICD-10-CM

## 2021-08-19 DIAGNOSIS — G30.1 LATE ONSET ALZHEIMER'S DISEASE WITH BEHAVIORAL DISTURBANCE (H): ICD-10-CM

## 2021-08-19 NOTE — PROGRESS NOTES
[facility]  PRIMARY CARE PROVIDER AND CLINIC:  Rylan Farris, APRN CNP, [address] / [place] [address]  Chief Complaint   Patient presents with     ER F/U     [place] Medical Record Number:  [id number]  [facility] where encounter took place[facility]  [employer] ([facility]) [[id number]]    Judith M Trierweiler  is a 80 year old  (1940), returned to the above facility from  [facility]. Hospital stay 8/18/21 - 8/18/21. .  Admitted to this facility for  rehab, medical management and nursing care.    HPI:    HPI information obtained from: facility chart records, facility staff, patient report and [facility] chart review.   Brief Summary of Hospital Course:   Fall.  S/p fall yesterday, found on floor. C/o R shoulder pain. EMS took to ED.  In ED, denied shoulder pain, reported some head pain.  CT of head, cervical spine neg.  Neuros stable.  Returned to AL.  Today denies any pain. Neuros remain stable    HTN: some elevated BP in ED, stable. Today.  Has stage 3 CKD, Cr. 1.46 in ED. Past Cr. Low 1s-1.4s.  Cont. On metoprolol, lisinopril, norvasc.     Alzheimer's.  Memory loss. Po intake variable at times per staff. occ agitation, striking out behaviors with cares.  Cont. On remeron.     Updates on Status Since Skilled nursing Admission: Neuros stable.  No c/o pain today    CODE STATUS/ADVANCE DIRECTIVES DISCUSSION:   DNR / DNI  Patient's living condition: lives in an assisted living facility  ALLERGIES: Lactose and Penicillins  PAST MEDICAL HISTORY:  has a past medical history of Arthritis, Cataract, Cerebral infarction (H), CKD (chronic kidney disease), Essential hypertension, benign, Falls frequently, Gastroesophageal reflux disease, Gout, Hemorrhoid, History of duodenal ulcer, History of DVT (deep vein thrombosis), History of GI bleed, Hyperlipidemia, Macular degeneration, Psoriasis, and Vitreous hemorrhage (H).  PAST SURGICAL HISTORY:   has a past  surgical history that includes NONSPECIFIC PROCEDURE; NONSPECIFIC PROCEDURE; and CATARACT.  FAMILY HISTORY: family history includes Cerebrovascular Disease in her mother; Coronary Artery Disease in her father; Hypertension in her mother; Kidney Disease in her mother.  SOCIAL HISTORY:   reports that she has never smoked. She has never used smokeless tobacco. She reports that she does not drink alcohol and does not use drugs.    Post Discharge Medication Reconciliation Status: discharge medications reconciled, continue medications without change          ROS:  Unobtainable secondary to cognitive impairment. Reports feeling fine today. No pains    Vitals:  /77   Pulse 62   Resp 18   SpO2 96%   Exam:  GENERAL APPEARANCE:  Alert, in no distress, cooperative  ENT:  Mouth and posterior oropharynx normal, moist mucous membranes, [place]  EYES:  EOM, conjunctivae, lids, pupils and irises normal, PERRL  NECK:  FROM  RESP:  respiratory effort and palpation of chest normal, lungs clear to auscultation , no respiratory distress  CV:  Palpation and auscultation of heart done , regular rate and rhythm, no murmur, rub, or gallop, no edema  ABDOMEN:  normal bowel sounds, soft, nontender, no hepatosplenomegaly or other masses, no guarding or rebound  M/S:   muscle strength 5/5 UEs, no apparent pain with PROM.  gen. LE weakness  NEURO:   Cranial nerves 2-12 are normal tested and grossly at patient's baseline, speech clear  PSYCH:  insight and judgement impaired, memory impaired , affect and mood normal    Lab/Diagnostic data:    Most Recent 3 CBC's:Recent Labs   Lab Test 08/18/21  1327 06/15/21  1031 11/19/20  1455   WBC 8.6 6.5 9.1   HGB 11.7 10.9* 10.8*   MCV 97 93 95    209 277     Most Recent 3 BMP's:Recent Labs   Lab Test 08/18/21  1327 06/15/21  1031 11/19/20  1455    140 139   POTASSIUM 4.7 4.4 4.3   CHLORIDE 110* 109* 105   CO2 23 23 24   BUN 40* 26 38*   CR 1.46* 1.15* 1.84*   ANIONGAP 7 8 10   SLIME 9.7 9.1  9.6   GLC 84 66* 101       ASSESSMENT/PLAN:  Fall, subsequent encounter  No pain today, Neuros stable  1. Cont. To  Monitor for changes in neuros  2. Cont. Tylenol  3. Monitor for reports of pain  4. Encourage increased act. Level as michael    Essential hypertension  Stable today. Elevated during ED visit. CKD stage 3  1. Cont. Norvasc, metoprolol, lisinopril  2. Cont. Hold parameters for metoprolol  3. 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  UA neg in ED  1. Cont. remeron  2. Cont. Secured memory care unit  3. Follow po intake, wt.s           Electronically signed by:  JAYLAN Morales CNP

## 2021-08-19 NOTE — LETTER
8/19/2021        RE: Judith M Trierweiler  [address] [place] [address]        [facility]  PRIMARY CARE PROVIDER AND CLINIC:  JAYLAN Morales CNP, [address] [place] [address] / [place] [address]  Chief Complaint   Patient presents with     ER F/U     [place] Medical Record Number:  [id number]  Place of Service where encounter took place:  [facility]  [employer] [facility] [[id number]]    Judith M Trierweiler  is a 80 year old  (1940), returned to the above facility from  [facility]. Hospital stay 8/18/21 - 8/18/21. .  Admitted to this facility for  rehab, medical management and nursing care.    HPI:    HPI information obtained from: facility chart records, facility staff, patient report and [facility] chart review.   Brief Summary of Hospital Course:   Fall.  S/p fall yesterday, found on floor. C/o R shoulder pain. EMS took to ED.  In ED, denied shoulder pain, reported some head pain.  CT of head, cervical spine neg.  Neuros stable.  Returned to AL.  Today denies any pain. Neuros remain stable    HTN: some elevated BP in ED, stable. Today.  Has stage 3 CKD, Cr. 1.46 in ED. Past Cr. Low 1s-1.4s.  Cont. On metoprolol, lisinopril, norvasc.     Alzheimer's.  Memory loss. Po intake variable at times per staff. occ agitation, striking out behaviors with cares.  Cont. On remeron.     Updates on Status Since Skilled nursing Admission: Neuros stable.  No c/o pain today    CODE STATUS/ADVANCE DIRECTIVES DISCUSSION:   DNR / DNI  Patient's living condition: lives in an assisted living facility  ALLERGIES: Lactose and Penicillins  PAST MEDICAL HISTORY:  has a past medical history of Arthritis, Cataract, Cerebral infarction (H), CKD (chronic kidney disease), Essential hypertension, benign, Falls frequently, Gastroesophageal reflux disease, Gout, Hemorrhoid, History of duodenal ulcer, History of DVT (deep vein thrombosis), History of GI bleed, Hyperlipidemia, Macular  degeneration, Psoriasis, and Vitreous hemorrhage (H).  PAST SURGICAL HISTORY:   has a past surgical history that includes NONSPECIFIC PROCEDURE; NONSPECIFIC PROCEDURE; and CATARACT.  FAMILY HISTORY: family history includes Cerebrovascular Disease in her mother; Coronary Artery Disease in her father; Hypertension in her mother; Kidney Disease in her mother.  SOCIAL HISTORY:   reports that she has never smoked. She has never used smokeless tobacco. She reports that she does not drink alcohol and does not use drugs.    Post Discharge Medication Reconciliation Status: discharge medications reconciled, continue medications without change          ROS:  Unobtainable secondary to cognitive impairment. Reports feeling fine today. No pains    Vitals:  /77   Pulse 62   Resp 18   SpO2 96%   Exam:  GENERAL APPEARANCE:  Alert, in no distress, cooperative  ENT:  Mouth and posterior oropharynx normal, moist mucous membranes, [place]  EYES:  EOM, conjunctivae, lids, pupils and irises normal, PERRL  NECK:  FROM  RESP:  respiratory effort and palpation of chest normal, lungs clear to auscultation , no respiratory distress  CV:  Palpation and auscultation of heart done , regular rate and rhythm, no murmur, rub, or gallop, no edema  ABDOMEN:  normal bowel sounds, soft, nontender, no hepatosplenomegaly or other masses, no guarding or rebound  M/S:   muscle strength 5/5 UEs, no apparent pain with PROM.  gen. LE weakness  NEURO:   Cranial nerves 2-12 are normal tested and grossly at patient's baseline, speech clear  PSYCH:  insight and judgement impaired, memory impaired , affect and mood normal    Lab/Diagnostic data:    Most Recent 3 CBC's:Recent Labs   Lab Test 08/18/21  1327 06/15/21  1031 11/19/20  1455   WBC 8.6 6.5 9.1   HGB 11.7 10.9* 10.8*   MCV 97 93 95    209 277     Most Recent 3 BMP's:Recent Labs   Lab Test 08/18/21  1327 06/15/21  1031 11/19/20  1455    140 139   POTASSIUM 4.7 4.4 4.3   CHLORIDE 110* 109*  105   CO2 23 23 24   BUN 40* 26 38*   CR 1.46* 1.15* 1.84*   ANIONGAP 7 8 10   SLIME 9.7 9.1 9.6   GLC 84 66* 101       ASSESSMENT/PLAN:  Fall, subsequent encounter  No pain today, Neuros stable  1. Cont. To  Monitor for changes in neuros  2. Cont. Tylenol  3. Monitor for reports of pain  4. Encourage increased act. Level as michael    Essential hypertension  Stable today. Elevated during ED visit. CKD stage 3  1. Cont. Norvasc, metoprolol, lisinopril  2. Cont. Hold parameters for metoprolol  3. 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  UA neg in ED  1. Cont. remeron  2. Cont. Secured memory care unit  3. Follow po intake, wt.s           Electronically signed by:  JAYLAN Morales CNP                           Sincerely,        JAYLAN Morales CNP

## 2021-09-03 DIAGNOSIS — R21 RASH: Primary | ICD-10-CM

## 2021-09-03 RX ORDER — NYSTATIN 100000 U/G
CREAM TOPICAL
Qty: 30 G | Refills: 97 | Status: SHIPPED | OUTPATIENT
Start: 2021-09-03

## 2021-09-13 ENCOUNTER — ASSISTED LIVING VISIT (OUTPATIENT)
Dept: GERIATRICS | Facility: CLINIC | Age: 81
End: 2021-09-13
Payer: COMMERCIAL

## 2021-09-13 VITALS
SYSTOLIC BLOOD PRESSURE: 137 MMHG | RESPIRATION RATE: 16 BRPM | OXYGEN SATURATION: 94 % | TEMPERATURE: 97.8 F | HEART RATE: 62 BPM | DIASTOLIC BLOOD PRESSURE: 62 MMHG

## 2021-09-13 DIAGNOSIS — K21.9 GASTROESOPHAGEAL REFLUX DISEASE, UNSPECIFIED WHETHER ESOPHAGITIS PRESENT: ICD-10-CM

## 2021-09-13 DIAGNOSIS — M54.50 ACUTE MIDLINE LOW BACK PAIN WITHOUT SCIATICA: Primary | ICD-10-CM

## 2021-09-13 DIAGNOSIS — I10 ESSENTIAL HYPERTENSION: ICD-10-CM

## 2021-09-13 RX ORDER — ACETAMINOPHEN 500 MG
1000 TABLET ORAL 2 TIMES DAILY
COMMUNITY
End: 2021-10-29

## 2021-09-13 NOTE — PROGRESS NOTES
[facility]  [place] Medical Record Number:  [id number]  Place of Service where encounter took place:  [facility] [[id number]]  Chief Complaint   Patient presents with     Back Pain       HPI:    Judith M Trierweiler  is a 80 year old (1940), who is being seen today for an episodic care visit.  HPI information obtained from: facility chart records, facility staff, patient report and [facility] chart review. Today's concern is: low back pain, HTN, GERD.  Has h/o falls.  ED visit 8/18/21 for fall. Head CT, cervical spine CT neg for acute changes. R shoulder pain stable. Today staff reports episode of resident found sleeping on floor with pillow 9/5/21. No witnessed fall, unclear how she ended up on floor.  No apparent inj. At the time.  Today reports increase in low back pain, difficulty sitting up from bed.  Has chronic low back pain. Non-amb. Assist of 1-2 for stand, pivot transfers. Cont. On every day tylenol. For HTN cont. On norvasc, lisinopril, metoprolol. Lower BPs yesterday with SBPs in 90s.  Has hold parameter for metoprolol. BP stable today. For GERd cont. On prilosec.  Po intake variable. No recent reports of nausea.       Past Medical and Surgical History reviewed in Epic today.    MEDICATIONS:          REVIEW OF SYSTEMS:  Unobtainable secondary to cognitive impairment. Reports some back pain today    Objective:  /62   Pulse 62   Temp 97.8  F (36.6  C)   Resp 16   SpO2 94%   Exam:  GENERAL APPEARANCE:  Alert, cooperative  ENT:  Mouth and posterior oropharynx normal, moist mucous membranes, [place]  EYES:  EOM, conjunctivae, lids, pupils and irises normal, PERRL  NECK:  FROM  RESP:  respiratory effort and palpation of chest normal, lungs clear to auscultation , no respiratory distress  CV:  Palpation and auscultation of heart done , regular rate and rhythm, no murmur, rub, or gallop, no edema  ABDOMEN:  normal bowel sounds, soft, nontender, no  hepatosplenomegaly or other masses, no guarding or rebound  M/S:   assist to sit up in bed due to low back pain. no low back pain with palp. gen. LE weakness. assist to stand, piviot transfer.  NEURO:   Cranial nerves 2-12 are normal tested and grossly at patient's baseline, speech clear  PSYCH:  insight and judgement impaired, memory impaired , affect abnormal -flat    Labs:     Most Recent 3 CBC's:Recent Labs   Lab Test 08/18/21  1327 06/15/21  1031 11/19/20  1455   WBC 8.6 6.5 9.1   HGB 11.7 10.9* 10.8*   MCV 97 93 95    209 277     Most Recent 3 BMP's:Recent Labs   Lab Test 08/18/21  1327 06/15/21  1031 11/19/20  1455    140 139   POTASSIUM 4.7 4.4 4.3   CHLORIDE 110* 109* 105   CO2 23 23 24   BUN 40* 26 38*   CR 1.46* 1.15* 1.84*   ANIONGAP 7 8 10   SLIME 9.7 9.1 9.6   GLC 84 66* 101       ASSESSMENT/PLAN:  (M54.5) Acute midline low back pain without sciatica  (primary encounter diagnosis)  Comment: acute on chronic. Last report of possible fall 9/5/21-found lying on floor next to bed with pillow under head.  No apparent inj. At the time  Plan: 1. Increase sched. Tylenol to bid. Cont. Prn tylenol  2. Start sportscream 10% 2g to low back bid  3. XR lumbar spine today  4. Once XR results available, may refer to PT for LE weakness, back pain    (I10) Essential hypertension  Comment: lower SBP yesterday, stable today  Plan: 1. Cont. Lisinopril, norvasc, metoprolol  2. Cont. Hold parameter for metoprolol for SBP < 100  3. Reassess BPS over next few days, for ongoing SBPs < 100, may decrease lisinopril dose  4. Encourage fluids    (K21.9) Gastroesophageal reflux disease, unspecified whether esophagitis present  Comment: currently stable  Plan: 1. Cont. prilosec  2. Monitor for reports of nausea  3. Follow po intake, wt.s      Electronically signed by:  JAYLAN Morales CNP         Documentation of Face-to-Face and Certification for Home Health Services     Patient: Judith M Trierweiler   Date  of Birth: 1940  MR Number: [id number]  Today's Date: 9/13/2021    I certify that patient: Judith M Trierweiler is under my care and that I, or a nurse practitioner or physician's assistant working with me, had a face-to-face encounter that meets the physician face-to-face encounter requirements with this patient on: 9/13/2021.    This encounter with the patient was in whole, or in part, for the following medical condition, which is the primary reason for home health care: LE weakness, low back pain.    I certify that, based on my findings, the following services are medically necessary home health services: Physical Therapy.    My clinical findings support the need for the above services because: Physical Therapy Services are needed to assess and treat the following functional impairments: LE weakness, low back pain.    Further, I certify that my clinical findings support that this patient is homebound (i.e. absences from home require considerable and taxing effort and are for medical reasons or Religion services or infrequently or of short duration when for other reasons) because: Requires assistance of another person or specialized equipment to access medical services because patient:  requries assist with transfers, non-amb...    Based on the above findings. I certify that this patient is confined to the home and needs intermittent skilled nursing care, physical therapy and/or speech therapy.  The patient is under my care, and I have initiated the establishment of the plan of care.  This patient will be followed by a physician who will periodically review the plan of care.  Physician/Provider to provide follow up care: Rylan Farris    Responsible Medicare certified [place] Physician: Vale Lawrence  Physician Signature: See electronic signature associated with these discharge orders.  Date: 9/13/2021

## 2021-09-13 NOTE — LETTER
9/13/2021        RE: Judith M Trierweiler  [address] [place] [address]        [facility]  [place] Medical Record Number:  [id number]  Place of Service where encounter took place:  [facility] [employer]  [employer] ([facility]) [[id number]]  Chief Complaint   Patient presents with     Back Pain       HPI:    Judith M Trierweiler  is a 80 year old (1940), who is being seen today for an episodic care visit.  HPI information obtained from: facility chart records, facility staff, patient report and [facility] chart review. Today's concern is: low back pain, HTN, GERD.  Has h/o falls.  ED visit 8/18/21 for fall. Head CT, cervical spine CT neg for acute changes. R shoulder pain stable. Today staff reports episode of resident found sleeping on floor with pillow 9/5/21. No witnessed fall, unclear how she ended up on floor.  No apparent inj. At the time.  Today reports increase in low back pain, difficulty sitting up from bed.  Has chronic low back pain. Non-amb. Assist of 1-2 for stand, pivot transfers. Cont. On every day tylenol. For HTN cont. On norvasc, lisinopril, metoprolol. Lower BPs yesterday with SBPs in 90s.  Has hold parameter for metoprolol. BP stable today. For GERd cont. On prilosec.  Po intake variable. No recent reports of nausea.       Past Medical and Surgical History reviewed in Epic today.    MEDICATIONS:          REVIEW OF SYSTEMS:  Unobtainable secondary to cognitive impairment. Reports some back pain today    Objective:  /62   Pulse 62   Temp 97.8  F (36.6  C)   Resp 16   SpO2 94%   Exam:  GENERAL APPEARANCE:  Alert, cooperative  ENT:  Mouth and posterior oropharynx normal, moist mucous membranes, [place]  EYES:  EOM, conjunctivae, lids, pupils and irises normal, PERRL  NECK:  FROM  RESP:  respiratory effort and palpation of chest normal, lungs clear to auscultation , no respiratory distress  CV:  Palpation and auscultation of heart done , regular rate and rhythm,  no murmur, rub, or gallop, no edema  ABDOMEN:  normal bowel sounds, soft, nontender, no hepatosplenomegaly or other masses, no guarding or rebound  M/S:   assist to sit up in bed due to low back pain. no low back pain with palp. gen. LE weakness. assist to stand, piviot transfer.  NEURO:   Cranial nerves 2-12 are normal tested and grossly at patient's baseline, speech clear  PSYCH:  insight and judgement impaired, memory impaired , affect abnormal -flat    Labs:     Most Recent 3 CBC's:Recent Labs   Lab Test 08/18/21  1327 06/15/21  1031 11/19/20  1455   WBC 8.6 6.5 9.1   HGB 11.7 10.9* 10.8*   MCV 97 93 95    209 277     Most Recent 3 BMP's:Recent Labs   Lab Test 08/18/21  1327 06/15/21  1031 11/19/20  1455    140 139   POTASSIUM 4.7 4.4 4.3   CHLORIDE 110* 109* 105   CO2 23 23 24   BUN 40* 26 38*   CR 1.46* 1.15* 1.84*   ANIONGAP 7 8 10   SLIME 9.7 9.1 9.6   GLC 84 66* 101       ASSESSMENT/PLAN:  (M54.5) Acute midline low back pain without sciatica  (primary encounter diagnosis)  Comment: acute on chronic. Last report of possible fall 9/5/21-found lying on floor next to bed with pillow under head.  No apparent inj. At the time  Plan: 1. Increase sched. Tylenol to bid. Cont. Prn tylenol  2. Start sportscream 10% 2g to low back bid  3. XR lumbar spine today  4. Once XR results available, may refer to PT for LE weakness, back pain    (I10) Essential hypertension  Comment: lower SBP yesterday, stable today  Plan: 1. Cont. Lisinopril, norvasc, metoprolol  2. Cont. Hold parameter for metoprolol for SBP < 100  3. Reassess BPS over next few days, for ongoing SBPs < 100, may decrease lisinopril dose  4. Encourage fluids    (K21.9) Gastroesophageal reflux disease, unspecified whether esophagitis present  Comment: currently stable  Plan: 1. Cont. prilosec  2. Monitor for reports of nausea  3. Follow po intake, wt.s      Electronically signed by:  JAYLAN Morales CNP         Documentation of  Face-to-Face and Certification for Home Health Services     Patient: Judith M Trierweiler   YOB: 1940  MR Number: [id number]  Today's Date: 9/13/2021    I certify that patient: Judith M Trierweiler is under my care and that I, or a nurse practitioner or physician's assistant working with me, had a face-to-face encounter that meets the physician face-to-face encounter requirements with this patient on: 9/13/2021.    This encounter with the patient was in whole, or in part, for the following medical condition, which is the primary reason for home health care: LE weakness, low back pain.    I certify that, based on my findings, the following services are medically necessary home health services: Physical Therapy.    My clinical findings support the need for the above services because: Physical Therapy Services are needed to assess and treat the following functional impairments: LE weakness, low back pain.    Further, I certify that my clinical findings support that this patient is homebound (i.e. absences from home require considerable and taxing effort and are for medical reasons or [employer] services or infrequently or of short duration when for other reasons) because: Requires assistance of another person or specialized equipment to access medical services because patient:  requries assist with transfers, non-amb...    Based on the above findings. I certify that this patient is confined to the home and needs intermittent skilled nursing care, physical therapy and/or speech therapy.  The patient is under my care, and I have initiated the establishment of the plan of care.  This patient will be followed by a physician who will periodically review the plan of care.  Physician/Provider to provide follow up care: Rylan Farris    Responsible Medicare certified [employer] Physician: Vale Lawrence  Physician Signature: See electronic signature associated with these discharge orders.  Date:  9/13/2021                Sincerely,        JAYLAN Morales CNP

## 2021-09-16 ENCOUNTER — ASSISTED LIVING VISIT (OUTPATIENT)
Dept: GERIATRICS | Facility: CLINIC | Age: 81
End: 2021-09-16
Payer: COMMERCIAL

## 2021-09-16 VITALS
OXYGEN SATURATION: 93 % | HEART RATE: 76 BPM | SYSTOLIC BLOOD PRESSURE: 129 MMHG | RESPIRATION RATE: 16 BRPM | DIASTOLIC BLOOD PRESSURE: 78 MMHG

## 2021-09-16 DIAGNOSIS — I10 ESSENTIAL HYPERTENSION: ICD-10-CM

## 2021-09-16 DIAGNOSIS — M54.50 ACUTE MIDLINE LOW BACK PAIN WITHOUT SCIATICA: Primary | ICD-10-CM

## 2021-09-16 DIAGNOSIS — F02.818 LATE ONSET ALZHEIMER'S DISEASE WITH BEHAVIORAL DISTURBANCE (H): ICD-10-CM

## 2021-09-16 DIAGNOSIS — G30.1 LATE ONSET ALZHEIMER'S DISEASE WITH BEHAVIORAL DISTURBANCE (H): ICD-10-CM

## 2021-09-16 NOTE — LETTER
9/16/2021        RE: Judith M Trierweiler  [address] [place] [address]        [place] [facility]  [place] Medical Record Number:  [id number]  Place of [facility] encounter [facility]) [[id number]]  Chief Complaint   Patient presents with     Back Pain     Hypertension       HPI:    Judith M Trierweiler  is a 80 year old (1940), who is being seen today for an episodic care visit.  HPI information obtained from: facility chart records, facility staff, patient report and [facility] chart review. Today's concern is:back pain, HTN, alzheimer's.  Has chronic low back pain, recent exac.  Was found lying on floor next to bed with pillow under head earlier this month, no apparent inj at that time. Past few days has increased low back pain in ams.  Cont. On tylenol  aspercreme started to low back.  Pain somewhat improved.  Per staff, resident requires assist while in bed to reposition.  Most nights sleeps on back without changing position. Has some recent lower BPs.  9/11/21 with SBP 90s.  Since this time, SBPs> 100. No recent reports of dizziness.  Mood, behaviors unchanged.  Memory loss due to alzheimer's.  Per staff, po intake gen. Stable. Can refuse cares at times.       Past Medical and Surgical History reviewed in Epic today.    MEDICATIONS:          REVIEW OF SYSTEMS:  Unobtainable secondary to cognitive impairment. Reports back feels ok now in w.c.    Objective:  /78   Pulse 76   Resp 16   SpO2 93%   Exam:  GENERAL APPEARANCE:  Alert, in no distress, appears healthy  ENT:  Mouth and posterior oropharynx normal, moist mucous membranes, [place]  EYES:  EOM, conjunctivae, lids, pupils and irises normal, PERRL  NECK:  No adenopathy,masses or thyromegaly, FROM  RESP:  respiratory effort and palpation of chest normal, lungs clear to auscultation , no respiratory distress  CV:  Palpation and auscultation of heart done , regular rate and rhythm,  no murmur, rub, or gallop, no edema  ABDOMEN:  normal bowel sounds, soft, nontender, no hepatosplenomegaly or other masses, no guarding or rebound  M/S:   muscle strength 5/5 UEs, 3/5 LEs. normal tone  NEURO:   Cranial nerves 2-12 are normal tested and grossly at patient's baseline, no tremor  PSYCH:  insight and judgement impaired, memory impaired , affect and mood normal    Labs:     Most Recent 3 BMP's:Recent Labs   Lab Test 08/18/21  1327 06/15/21  1031 11/19/20  1455    140 139   POTASSIUM 4.7 4.4 4.3   CHLORIDE 110* 109* 105   CO2 23 23 24   BUN 40* 26 38*   CR 1.46* 1.15* 1.84*   ANIONGAP 7 8 10   SLIME 9.7 9.1 9.6   GLC 84 66* 101       ASSESSMENT/PLAN:  (M54.5) Acute midline low back pain without sciatica  (primary encounter diagnosis)  Comment: somewhat improved.  XR lumbar spine done neg for acute changes.  Plan: 1. Cont. Tylenol  2. Cont. aspercreme  3. Staff to start repositioning during the night  4. Reassess over next couple weeks, may consider hosp. bed    (I10) Essential hypertension  Comment: recent low BPs, currently stable  Plan: 1. Cont. Lisinopril, norvasc, metoprolol  2. Follow BPs, HRs  3. Encourage fluids  4. 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 Resistive with cares at times  Plan: 1. Cont. remeron  2. Cont. To invite to activities throughout there day  3. Monitor for reports of aggressive behaviors    Electronically signed by:  JAYLAN Morales CNP                 Sincerely,        JAYLAN Morales CNP

## 2021-09-16 NOTE — PROGRESS NOTES
[place] [facility]  [place] Medical Record Number:  [id number]  Place of Service where encounter took place:  [facility] [employer]  [employer] ([facility]) [[id number]]  Chief Complaint   Patient presents with     Back Pain     Hypertension       HPI:    Judith M Trierweiler  is a 80 year old (1940), who is being seen today for an episodic care visit.  HPI information obtained from: facility chart records, facility staff, patient report and [facility] chart review. Today's concern is:back pain, HTN, alzheimer's.  Has chronic low back pain, recent exac.  Was found lying on floor next to bed with pillow under head earlier this month, no apparent inj at that time. Past few days has increased low back pain in ams.  Cont. On tylenol  aspercreme started to low back.  Pain somewhat improved.  Per staff, resident requires assist while in bed to reposition.  Most nights sleeps on back without changing position. Has some recent lower BPs.  9/11/21 with SBP 90s.  Since this time, SBPs> 100. No recent reports of dizziness.  Mood, behaviors unchanged.  Memory loss due to alzheimer's.  Per staff, po intake gen. Stable. Can refuse cares at times.       Past Medical and Surgical History reviewed in Epic today.    MEDICATIONS:          REVIEW OF SYSTEMS:  Unobtainable secondary to cognitive impairment. Reports back feels ok now in w.c.    Objective:  /78   Pulse 76   Resp 16   SpO2 93%   Exam:  GENERAL APPEARANCE:  Alert, in no distress, appears healthy  ENT:  Mouth and posterior oropharynx normal, moist mucous membranes, [place]  EYES:  EOM, conjunctivae, lids, pupils and irises normal, PERRL  NECK:  No adenopathy,masses or thyromegaly, FROM  RESP:  respiratory effort and palpation of chest normal, lungs clear to auscultation , no respiratory distress  CV:  Palpation and auscultation of heart done , regular rate and rhythm, no murmur, rub, or gallop, no edema  ABDOMEN:  normal bowel sounds, soft, nontender, no  hepatosplenomegaly or other masses, no guarding or rebound  M/S:   muscle strength 5/5 UEs, 3/5 LEs. normal tone  NEURO:   Cranial nerves 2-12 are normal tested and grossly at patient's baseline, no tremor  PSYCH:  insight and judgement impaired, memory impaired , affect and mood normal    Labs:     Most Recent 3 BMP's:Recent Labs   Lab Test 08/18/21  1327 06/15/21  1031 11/19/20  1455    140 139   POTASSIUM 4.7 4.4 4.3   CHLORIDE 110* 109* 105   CO2 23 23 24   BUN 40* 26 38*   CR 1.46* 1.15* 1.84*   ANIONGAP 7 8 10   SLIME 9.7 9.1 9.6   GLC 84 66* 101       ASSESSMENT/PLAN:  (M54.5) Acute midline low back pain without sciatica  (primary encounter diagnosis)  Comment: somewhat improved.  XR lumbar spine done neg for acute changes.  Plan: 1. Cont. Tylenol  2. Cont. aspercreme  3. Staff to start repositioning during the night  4. Reassess over next couple weeks, may consider hosp. bed    (I10) Essential hypertension  Comment: recent low BPs, currently stable  Plan: 1. Cont. Lisinopril, norvasc, metoprolol  2. Follow BPs, HRs  3. Encourage fluids  4. 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 Resistive with cares at times  Plan: 1. Cont. remeron  2. Cont. To invite to activities throughout there day  3. Monitor for reports of aggressive behaviors    Electronically signed by:  JAYLAN Morales CNP

## 2021-09-19 ENCOUNTER — HEALTH MAINTENANCE LETTER (OUTPATIENT)
Age: 81
End: 2021-09-19

## 2021-09-23 ENCOUNTER — TELEPHONE (OUTPATIENT)
Dept: GERIATRICS | Facility: CLINIC | Age: 81
End: 2021-09-23

## 2021-09-23 NOTE — TELEPHONE ENCOUNTER
Prior Authorization Retail Medication Request    Medication/Dose: LIDOCAINE    PAD 5%  ICD code (if different than what is on RX):    Previously Tried and Failed:    Rationale:      Insurance Name:[employer]          Pharmacy Information (if different than what is on RX)  Name:  [facility]   Phone:  [phone]

## 2021-09-23 NOTE — TELEPHONE ENCOUNTER
Central Prior Authorization Team   Phone: [phone]      Prior Authorization Approval    Authorization Effective Date: 8/24/2021  Authorization Expiration Date: 9/23/2022  Medication: LIDOCAINE PATCH 5% - APPROVED  Approved Dose/Quantity: 30 FOR 30  Reference #:     Insurance Company: [employer] - Phone [phone] Fax [phone]  Expected CoPay:       CoPay Card Available:      Foundation Assistance Needed:    Which Pharmacy is filling the prescription (Not needed for infusion/clinic administered): [facility] - [address]  Pharmacy Notified: Yes  Patient Notified: Yes (**Instructed pharmacy to notify patient when script is ready to /ship.**)

## 2021-09-24 DIAGNOSIS — R52 PAIN: ICD-10-CM

## 2021-09-24 RX ORDER — TROLAMINE SALICYLATE 10 G/100G
CREAM TOPICAL
Qty: 85 G | Refills: 97 | Status: SHIPPED | OUTPATIENT
Start: 2021-09-24

## 2021-09-27 ENCOUNTER — LAB REQUISITION (OUTPATIENT)
Dept: LAB | Facility: CLINIC | Age: 81
End: 2021-09-27
Payer: COMMERCIAL

## 2021-09-27 ENCOUNTER — ASSISTED LIVING VISIT (OUTPATIENT)
Dept: GERIATRICS | Facility: CLINIC | Age: 81
End: 2021-09-27
Payer: COMMERCIAL

## 2021-09-27 VITALS
HEART RATE: 80 BPM | DIASTOLIC BLOOD PRESSURE: 77 MMHG | SYSTOLIC BLOOD PRESSURE: 131 MMHG | RESPIRATION RATE: 18 BRPM | OXYGEN SATURATION: 92 %

## 2021-09-27 DIAGNOSIS — N39.0 URINARY TRACT INFECTION, SITE NOT SPECIFIED: ICD-10-CM

## 2021-09-27 DIAGNOSIS — M54.50 ACUTE MIDLINE LOW BACK PAIN WITHOUT SCIATICA: ICD-10-CM

## 2021-09-27 DIAGNOSIS — M62.81 GENERALIZED MUSCLE WEAKNESS: ICD-10-CM

## 2021-09-27 DIAGNOSIS — R29.6 RECURRENT FALLS: Primary | ICD-10-CM

## 2021-09-27 LAB
ALBUMIN UR-MCNC: 10 MG/DL
APPEARANCE UR: CLEAR
BILIRUB UR QL STRIP: NEGATIVE
COLOR UR AUTO: ABNORMAL
GLUCOSE UR STRIP-MCNC: NEGATIVE MG/DL
HGB UR QL STRIP: NEGATIVE
KETONES UR STRIP-MCNC: NEGATIVE MG/DL
LEUKOCYTE ESTERASE UR QL STRIP: ABNORMAL
MUCOUS THREADS #/AREA URNS LPF: PRESENT /LPF
NITRATE UR QL: NEGATIVE
PH UR STRIP: 5.5 [PH] (ref 5–7)
RBC URINE: 2 /HPF
SP GR UR STRIP: 1.02 (ref 1–1.03)
SQUAMOUS EPITHELIAL: <1 /HPF
UROBILINOGEN UR STRIP-MCNC: <2 MG/DL
WBC URINE: 30 /HPF

## 2021-09-27 PROCEDURE — 81001 URINALYSIS AUTO W/SCOPE: CPT | Mod: ORL | Performed by: NURSE PRACTITIONER

## 2021-09-27 RX ORDER — LIDOCAINE 50 MG/G
1 PATCH TOPICAL EVERY 24 HOURS
COMMUNITY
End: 2021-10-25

## 2021-09-27 NOTE — LETTER
9/27/2021        RE: Judith M Trierweiler  [address] [place] [address]        [place] [facility]  [place] Medical Record Number:  [id number]  Place of Service where encounter took place:  [facility] [employer]  [employer] ([facility]) [[id number]]  Chief Complaint   Patient presents with     Fall       HPI:    Judith M Trierweiler  is a 81 year old (1940), who is being seen today for an episodic care visit.  HPI information obtained from: facility chart records, facility staff, patient report and [facility] chart review. Today's concern is: falls, weakness, back pain.  S/p fall OOB 9/24/21 and over weekend. Found on floor.  No apparent inj.  Neuros stable.  has alzheimer's with memory loss, ongoing LE weakness.  Requires assist with transfers. Amb. Only short distances in room with a assist. For back pain cont. On tylenol. lidoderm patch at hs added 9/25/21. Resident reports that this has helped. Cont. To have some am back pain.      Past Medical and Surgical History reviewed in Epic today. Reports some back pain at times    MEDICATIONS:          REVIEW OF SYSTEMS:  Unobtainable secondary to cognitive impairment.     Objective:  /77   Pulse 80   Resp 18   SpO2 92%   Exam:  GENERAL APPEARANCE:  Alert, in no distress, cooperative  ENT:  Mouth and posterior oropharynx normal, moist mucous membranes, [place]  EYES:  EOM, conjunctivae, lids, pupils and irises normal, PERRL  RESP:  respiratory effort and palpation of chest normal, lungs clear to auscultation , no respiratory distress  CV:  Palpation and auscultation of heart done , regular rate and rhythm, no murmur, rub, or gallop, no edema  ABDOMEN:  normal bowel sounds, soft, nontender, no hepatosplenomegaly or other masses, no guarding or rebound  M/S:   muscle strength 5/5 UEs, gen. LE weakness. assist to sit up in bed. assist with transfers.  some low back pain with sitting up in bed.  NEURO:   Cranial nerves 2-12 are normal tested and  grossly at patient's baseline, speech clear  PSYCH:  insight and judgement impaired, memory impaired , affect abnormal -flat    Labs:     Most Recent 3 CBC's:Recent Labs   Lab Test 08/18/21  1327 06/15/21  1031 11/19/20  1455   WBC 8.6 6.5 9.1   HGB 11.7 10.9* 10.8*   MCV 97 93 95    209 277     Most Recent 3 BMP's:Recent Labs   Lab Test 08/18/21  1327 06/15/21  1031 11/19/20  1455    140 139   POTASSIUM 4.7 4.4 4.3   CHLORIDE 110* 109* 105   CO2 23 23 24   BUN 40* 26 38*   CR 1.46* 1.15* 1.84*   ANIONGAP 7 8 10   SLIME 9.7 9.1 9.6   GLC 84 66* 101       ASSESSMENT/PLAN:  (R29.6) Recurrent falls  (primary encounter diagnosis)  Comment: ongoing. Likely attempts to self-transfer from bed  Plan: 1. Cont. Sched. Assist to toilet throughout the day and hs  2. Monitor for attempts to self transfer  3. Cont. W.c. while out of bed    (M62.81) Generalized muscle weakness  Comment: ongoing. Recurrent falls, minimal amb  Plan: 1. Refer to PT  2. Encourage foot propel in w.c  3. Invite to activities throughout the day  4. Encourage po intake-sleeps through breakfast at times, refusing to get OOB    (M54.5) Acute midline low back pain without sciatica  Comment: somewhat improved per resident  Plan: 1. Cont. Tylenol  2. Cont. lidoderm patch  3. PT as above  4. Cont. Staff assisted repositioning in bed throughout the night    Electronically signed by:  JAYLAN Morales CNP                 Sincerely,        JAYLAN Morales CNP

## 2021-09-27 NOTE — PROGRESS NOTES
[place] [facility]  [place] Medical Record Number:  [id number]  Place of Service where encounter took place:  [facility] [employer]  [facility] ([facility]) [[id number]]  Chief Complaint   Patient presents with     Fall       HPI:    Judith M Trierweiler  is a 81 year old (1940), who is being seen today for an episodic care visit.  HPI information obtained from: facility chart records, facility staff, patient report and [facility] chart review. Today's concern is: falls, weakness, back pain.  S/p fall OOB 9/24/21 and over weekend. Found on floor.  No apparent inj.  Neuros stable.  has alzheimer's with memory loss, ongoing LE weakness.  Requires assist with transfers. Amb. Only short distances in room with a assist. For back pain cont. On tylenol. lidoderm patch at hs added 9/25/21. Resident reports that this has helped. Cont. To have some am back pain.      Past Medical and Surgical History reviewed in Epic today. Reports some back pain at times    MEDICATIONS:          REVIEW OF SYSTEMS:  Unobtainable secondary to cognitive impairment.     Objective:  /77   Pulse 80   Resp 18   SpO2 92%   Exam:  GENERAL APPEARANCE:  Alert, in no distress, cooperative  ENT:  Mouth and posterior oropharynx normal, moist mucous membranes, [place]  EYES:  EOM, conjunctivae, lids, pupils and irises normal, PERRL  RESP:  respiratory effort and palpation of chest normal, lungs clear to auscultation , no respiratory distress  CV:  Palpation and auscultation of heart done , regular rate and rhythm, no murmur, rub, or gallop, no edema  ABDOMEN:  normal bowel sounds, soft, nontender, no hepatosplenomegaly or other masses, no guarding or rebound  M/S:   muscle strength 5/5 UEs, gen. LE weakness. assist to sit up in bed. assist with transfers.  some low back pain with sitting up in bed.  NEURO:   Cranial nerves 2-12 are normal tested and grossly at patient's baseline, speech clear  PSYCH:  insight and judgement impaired, memory  impaired , affect abnormal -flat    Labs:     Most Recent 3 CBC's:Recent Labs   Lab Test 08/18/21  1327 06/15/21  1031 11/19/20  1455   WBC 8.6 6.5 9.1   HGB 11.7 10.9* 10.8*   MCV 97 93 95    209 277     Most Recent 3 BMP's:Recent Labs   Lab Test 08/18/21  1327 06/15/21  1031 11/19/20  1455    140 139   POTASSIUM 4.7 4.4 4.3   CHLORIDE 110* 109* 105   CO2 23 23 24   BUN 40* 26 38*   CR 1.46* 1.15* 1.84*   ANIONGAP 7 8 10   SLIME 9.7 9.1 9.6   GLC 84 66* 101       ASSESSMENT/PLAN:  (R29.6) Recurrent falls  (primary encounter diagnosis)  Comment: ongoing. Likely attempts to self-transfer from bed  Plan: 1. Cont. Sched. Assist to toilet throughout the day and hs  2. Monitor for attempts to self transfer  3. Cont. W.c. while out of bed    (M62.81) Generalized muscle weakness  Comment: ongoing. Recurrent falls, minimal amb  Plan: 1. Refer to PT  2. Encourage foot propel in w.c  3. Invite to activities throughout the day  4. Encourage po intake-sleeps through breakfast at times, refusing to get OOB    (M54.5) Acute midline low back pain without sciatica  Comment: somewhat improved per resident  Plan: 1. Cont. Tylenol  2. Cont. lidoderm patch  3. PT as above  4. Cont. Staff assisted repositioning in bed throughout the night    Electronically signed by:  JAYLAN Morales CNP

## 2021-10-02 DIAGNOSIS — R05.9 COUGH: Primary | ICD-10-CM

## 2021-10-02 DIAGNOSIS — N32.81 OVERACTIVE BLADDER: ICD-10-CM

## 2021-10-04 ENCOUNTER — ASSISTED LIVING VISIT (OUTPATIENT)
Dept: GERIATRICS | Facility: CLINIC | Age: 81
End: 2021-10-04
Payer: COMMERCIAL

## 2021-10-04 VITALS
HEART RATE: 78 BPM | DIASTOLIC BLOOD PRESSURE: 66 MMHG | TEMPERATURE: 97.9 F | RESPIRATION RATE: 18 BRPM | SYSTOLIC BLOOD PRESSURE: 118 MMHG | OXYGEN SATURATION: 92 %

## 2021-10-04 DIAGNOSIS — G30.1 LATE ONSET ALZHEIMER'S DISEASE WITH BEHAVIORAL DISTURBANCE (H): ICD-10-CM

## 2021-10-04 DIAGNOSIS — M54.50 ACUTE MIDLINE LOW BACK PAIN WITHOUT SCIATICA: ICD-10-CM

## 2021-10-04 DIAGNOSIS — M62.81 GENERALIZED MUSCLE WEAKNESS: Primary | ICD-10-CM

## 2021-10-04 DIAGNOSIS — F02.818 LATE ONSET ALZHEIMER'S DISEASE WITH BEHAVIORAL DISTURBANCE (H): ICD-10-CM

## 2021-10-04 RX ORDER — MIRABEGRON 50 MG/1
TABLET, FILM COATED, EXTENDED RELEASE ORAL
Qty: 28 TABLET | Refills: 11 | Status: SHIPPED | OUTPATIENT
Start: 2021-10-04 | End: 2022-02-21

## 2021-10-04 NOTE — PROGRESS NOTES
[place] [facility]  [place] Medical Record Number:  [id number]  Place of Service where encounter took place:  [facility] [employer]  [facility] ([facility]) [[id number]]  Chief Complaint   Patient presents with     Fatigue     Dementia       HPI:    Judith M Trierweiler  is a 81 year old (1940), who is being seen today for an episodic care visit.  HPI information obtained from: facility chart records, facility staff, patient report and [facility] chart review. Today's concern is: weakness, alzheimer's, low back pain.  Has had 2 recent falls out of bed.  Has gen. LE weakness.  Assist to transfer. Had order for PT, however refused PT.  Has had ongoing increased agitation, aggressive behaviors at times towards staff.  Po intake variable. Does take prn ativan which is typically effective per staff.  Also taking remeron.  Has memory loss due to alzheimer's.  Refuses meds at times. Cont. On cipro for UTI. Has recent increase in low back pain. Reports lidoderm patch typically effective. Staff reports c/o back pain in am, however improve once up in chair. Has staff assist to reposition at night when in bed.       Past Medical and Surgical History reviewed in Epic today.    MEDICATIONS:          REVIEW OF SYSTEMS:  Unobtainable secondary to cognitive impairment. Reports back pain better    Objective:  /66   Pulse 78   Temp 97.9  F (36.6  C)   Resp 18   SpO2 92%   Exam:  GENERAL APPEARANCE:  Alert, in no distress, cooperative  ENT:  Mouth and posterior oropharynx normal, moist mucous membranes, [place]  EYES:  EOM, conjunctivae, lids, pupils and irises normal, PERRL  NECK:  FROM  RESP:  respiratory effort and palpation of chest normal, lungs clear to auscultation , no respiratory distress, diminished breath sounds bibasilar  CV:  Palpation and auscultation of heart done , regular rate and rhythm, no murmur, rub, or gallop, no edema  ABDOMEN:  normal bowel sounds, soft, nontender, no hepatosplenomegaly or  other masses, no guarding or rebound  M/S:   muscle strength 5/5 UEs, gen. LE weakness. assist to sit up in bed. reports some low back pain with sitting up  NEURO:   Cranial nerves 2-12 are normal tested and grossly at patient's baseline, no tremor  PSYCH:  insight and judgement impaired, memory impaired , affect and mood normal    Labs:     Most Recent 3 CBC's:Recent Labs   Lab Test 08/18/21  1327 06/15/21  1031 11/19/20  1455   WBC 8.6 6.5 9.1   HGB 11.7 10.9* 10.8*   MCV 97 93 95    209 277     Most Recent 3 BMP's:Recent Labs   Lab Test 08/18/21  1327 06/15/21  1031 11/19/20  1455    140 139   POTASSIUM 4.7 4.4 4.3   CHLORIDE 110* 109* 105   CO2 23 23 24   BUN 40* 26 38*   CR 1.46* 1.15* 1.84*   ANIONGAP 7 8 10   SLIME 9.7 9.1 9.6   GLC 84 66* 101       ASSESSMENT/PLAN:  (M62.81) Generalized muscle weakness  (primary encounter diagnosis)  Comment: ongoing.  No longer amb. Decreased mobility  Plan: 1. PT to attempt another session, if refuses again, may discontinue PT  2. Encourage to sit up in chair throughout the day  3. Monitor for skin breakdown    (G30.1,  F02.81) Late onset Alzheimer's disease with behavioral disturbance (H)  Comment: memory loss. Increased agitation, aggressive behaviors at times. Variable po intake  Plan: 1. Cont. Prn ativan  2. Cont. remeron  3. To have Hospice eval  4. For further increase in aggressive behaviors, may consider scehd.ativan    (M54.50) Acute midline low back pain without sciatica  Comment: improved. Some increased s/s in am  Plan: 1. PT as above  2. Cont. Tylenol  3. Cont. lidoderm patch  4. Encourage increased act. Level as michael    Electronically signed by:  JAYLAN Morales CNP

## 2021-10-04 NOTE — LETTER
10/4/2021        RE: Judith M Trierweiler  [address] [place] [address]        [place] [facility]  [place] Medical Record Number:  [id number]  Place of Service where encounter took place:  [facility] [employer]  [employer] ([facility]) [[id number]]  Chief Complaint   Patient presents with     Fatigue     Dementia       HPI:    Judith M Trierweiler  is a 81 year old (1940), who is being seen today for an episodic care visit.  HPI information obtained from: facility chart records, facility staff, patient report and [facility] chart review. Today's concern is: weakness, alzheimer's, low back pain.  Has had 2 recent falls out of bed.  Has gen. LE weakness.  Assist to transfer. Had order for PT, however refused PT.  Has had ongoing increased agitation, aggressive behaviors at times towards staff.  Po intake variable. Does take prn ativan which is typically effective per staff.  Also taking remeron.  Has memory loss due to alzheimer's.  Refuses meds at times. Cont. On cipro for UTI. Has recent increase in low back pain. Reports lidoderm patch typically effective. Staff reports c/o back pain in am, however improve once up in chair. Has staff assist to reposition at night when in bed.       Past Medical and Surgical History reviewed in Epic today.    MEDICATIONS:          REVIEW OF SYSTEMS:  Unobtainable secondary to cognitive impairment. Reports back pain better    Objective:  /66   Pulse 78   Temp 97.9  F (36.6  C)   Resp 18   SpO2 92%   Exam:  GENERAL APPEARANCE:  Alert, in no distress, cooperative  ENT:  Mouth and posterior oropharynx normal, moist mucous membranes, [place]  EYES:  EOM, conjunctivae, lids, pupils and irises normal, PERRL  NECK:  FROM  RESP:  respiratory effort and palpation of chest normal, lungs clear to auscultation , no respiratory distress, diminished breath sounds bibasilar  CV:  Palpation and auscultation of heart done , regular rate and rhythm, no murmur, rub, or  gallop, no edema  ABDOMEN:  normal bowel sounds, soft, nontender, no hepatosplenomegaly or other masses, no guarding or rebound  M/S:   muscle strength 5/5 UEs, gen. LE weakness. assist to sit up in bed. reports some low back pain with sitting up  NEURO:   Cranial nerves 2-12 are normal tested and grossly at patient's baseline, no tremor  PSYCH:  insight and judgement impaired, memory impaired , affect and mood normal    Labs:     Most Recent 3 CBC's:Recent Labs   Lab Test 08/18/21  1327 06/15/21  1031 11/19/20  1455   WBC 8.6 6.5 9.1   HGB 11.7 10.9* 10.8*   MCV 97 93 95    209 277     Most Recent 3 BMP's:Recent Labs   Lab Test 08/18/21  1327 06/15/21  1031 11/19/20  1455    140 139   POTASSIUM 4.7 4.4 4.3   CHLORIDE 110* 109* 105   CO2 23 23 24   BUN 40* 26 38*   CR 1.46* 1.15* 1.84*   ANIONGAP 7 8 10   SLIME 9.7 9.1 9.6   GLC 84 66* 101       ASSESSMENT/PLAN:  (M62.81) Generalized muscle weakness  (primary encounter diagnosis)  Comment: ongoing.  No longer amb. Decreased mobility  Plan: 1. PT to attempt another session, if refuses again, may discontinue PT  2. Encourage to sit up in chair throughout the day  3. Monitor for skin breakdown    (G30.1,  F02.81) Late onset Alzheimer's disease with behavioral disturbance (H)  Comment: memory loss. Increased agitation, aggressive behaviors at times. Variable po intake  Plan: 1. Cont. Prn ativan  2. Cont. remeron  3. To have Hospice eval  4. For further increase in aggressive behaviors, may consider scehd.ativan    (M54.50) Acute midline low back pain without sciatica  Comment: improved. Some increased s/s in am  Plan: 1. PT as above  2. Cont. Tylenol  3. Cont. lidoderm patch  4. Encourage increased act. Level as michael    Electronically signed by:  JAYLAN Morales CNP                 Sincerely,        JAYLAN Morales CNP

## 2021-10-07 ENCOUNTER — ASSISTED LIVING VISIT (OUTPATIENT)
Dept: GERIATRICS | Facility: CLINIC | Age: 81
End: 2021-10-07
Payer: COMMERCIAL

## 2021-10-07 VITALS
BODY MASS INDEX: 27.87 KG/M2 | SYSTOLIC BLOOD PRESSURE: 132 MMHG | TEMPERATURE: 97.7 F | HEART RATE: 68 BPM | RESPIRATION RATE: 16 BRPM | DIASTOLIC BLOOD PRESSURE: 66 MMHG | OXYGEN SATURATION: 92 % | WEIGHT: 138 LBS

## 2021-10-07 DIAGNOSIS — M54.50 MIDLINE LOW BACK PAIN WITHOUT SCIATICA, UNSPECIFIED CHRONICITY: ICD-10-CM

## 2021-10-07 DIAGNOSIS — F41.9 ANXIETY: Primary | ICD-10-CM

## 2021-10-07 DIAGNOSIS — K21.9 GASTROESOPHAGEAL REFLUX DISEASE, UNSPECIFIED WHETHER ESOPHAGITIS PRESENT: ICD-10-CM

## 2021-10-07 NOTE — PROGRESS NOTES
[facility]  [place] [facility] Record Number:  [id number]  Place of Service where encounter took place:  [facility]  [facility] ([facility]) [[id number]]  Chief Complaint   Patient presents with     Anxiety       HPI:    Judith M Trierweiler  is a 81 year old (1940), who is being seen today for an episodic care visit.  HPI information obtained from: facility chart records, facility staff, patient report, [facility] chart review and family/first contact son report. Today's concern is: anxiety, back pain, GERD.  Has memory loss due to dementia.  Cont. To have aggressive behaviors towards staff at times with cares.  Also noted to throw dishes at times.  Has been refusing medications. On times. Completing cipro course for UTI. Cont to sleep in in ams, some am low back pain, reports back pain cont. To improve.  Started on lidoderm patch.  Also takes tylenol. For GERD taking prilosec.  Per staff, has had stable po intake, wt. Stable.  No recent reports of emesis.       Past Medical and Surgical History reviewed in Epic  MEDICATIONS:          REVIEW OF SYSTEMS:  Unobtainable secondary to cognitive impairment. Reports feeling fine today.  No current back pain    Objective:  /66   Pulse 68   Temp 97.7  F (36.5  C)   Resp 16   Wt 62.6 kg (138 lb)   SpO2 92%   BMI 27.87 kg/m    Exam:  GENERAL APPEARANCE:  Alert, in no distress, appears healthy  ENT:  Mouth and posterior oropharynx normal, moist mucous membranes, [place]  EYES:  EOM, conjunctivae, lids, pupils and irises normal, PERRL  NECK:  FROM  RESP:  respiratory effort and palpation of chest normal, lungs clear to auscultation , no respiratory distress  CV:  Palpation and auscultation of heart done , regular rate and rhythm, no murmur, rub, or gallop, no edema  ABDOMEN:  normal bowel sounds, soft, nontender, no hepatosplenomegaly or other masses, no guarding or rebound  M/S:   able to sit up in bed with assist-no apparent back pain.  no  apparent low back pain with PROM LEs  NEURO:   Cranial nerves 2-12 are normal tested and grossly at patient's baseline, speech clear  PSYCH:  insight and judgement impaired, memory impaired , affect and mood normal    Labs:     Most Recent 3 CBC's:Recent Labs   Lab Test 08/18/21  1327 06/15/21  1031 11/19/20  1455   WBC 8.6 6.5 9.1   HGB 11.7 10.9* 10.8*   MCV 97 93 95    209 277     Most Recent 3 BMP's:Recent Labs   Lab Test 08/18/21  1327 06/15/21  1031 11/19/20  1455    140 139   POTASSIUM 4.7 4.4 4.3   CHLORIDE 110* 109* 105   CO2 23 23 24   BUN 40* 26 38*   CR 1.46* 1.15* 1.84*   ANIONGAP 7 8 10   SLIME 9.7 9.1 9.6   GLC 84 66* 101       ASSESSMENT/PLAN:  (F41.9) Anxiety  (primary encounter diagnosis)  Comment: ongoing s/s. Agitation at times, aggressive behaviors at times  Plan: 1. Cont. Prn ativan  2. Cont. remeron  3. Left message for son to discuss trial of seroquel vs sched. Ativan  4. Reassess over next few days    (M54.50) Midline low back pain without sciatica, unspecified chronicity  Comment: improving  Plan: 1. Cont. Tylenol  2. Cont. lidoderm patch  3. Cont. To assist with repositioning in bed throughout the night  4. Encourage to sit up during the day, propel self in w.c.    (K21.9) Gastroesophageal reflux disease, unspecified whether esophagitis present  Comment: currently stable.   Plan: 1. Cont. prilosec  2. Monitor for reports of nausea  3. For increased s/s may add prn maalox     Electronically signed by:  JAYLAN Morales CNP

## 2021-10-07 NOTE — LETTER
10/7/2021        RE: Judith M Trierweiler  [address] [place] [address]        [facility]  [place] [facility] Record Number:  [id number]  Place of Service where encounter took place:  [employer]  [employer] ([facility]) [[id number]]  Chief Complaint   Patient presents with     Anxiety       HPI:    Judith M Trierweiler  is a 81 year old (1940), who is being seen today for an episodic care visit.  HPI information obtained from: facility chart records, facility staff, patient report, [facility] chart review and family/first contact son report. Today's concern is: anxiety, back pain, GERD.  Has memory loss due to dementia.  Cont. To have aggressive behaviors towards staff at times with cares.  Also noted to throw dishes at times.  Has been refusing medications. On times. Completing cipro course for UTI. Cont to sleep in in ams, some am low back pain, reports back pain cont. To improve.  Started on lidoderm patch.  Also takes tylenol. For GERD taking prilosec.  Per staff, has had stable po intake, wt. Stable.  No recent reports of emesis.       Past Medical and Surgical History reviewed in Epic  MEDICATIONS:          REVIEW OF SYSTEMS:  Unobtainable secondary to cognitive impairment. Reports feeling fine today.  No current back pain    Objective:  /66   Pulse 68   Temp 97.7  F (36.5  C)   Resp 16   Wt 62.6 kg (138 lb)   SpO2 92%   BMI 27.87 kg/m    Exam:  GENERAL APPEARANCE:  Alert, in no distress, appears healthy  ENT:  Mouth and posterior oropharynx normal, moist mucous membranes, [place]  EYES:  EOM, conjunctivae, lids, pupils and irises normal, PERRL  NECK:  FROM  RESP:  respiratory effort and palpation of chest normal, lungs clear to auscultation , no respiratory distress  CV:  Palpation and auscultation of heart done , regular rate and rhythm, no murmur, rub, or gallop, no edema  ABDOMEN:  normal bowel sounds, soft, nontender, no hepatosplenomegaly or other masses,  no guarding or rebound  M/S:   able to sit up in bed with assist-no apparent back pain.  no apparent low back pain with PROM LEs  NEURO:   Cranial nerves 2-12 are normal tested and grossly at patient's baseline, speech clear  PSYCH:  insight and judgement impaired, memory impaired , affect and mood normal    Labs:     Most Recent 3 CBC's:Recent Labs   Lab Test 08/18/21  1327 06/15/21  1031 11/19/20  1455   WBC 8.6 6.5 9.1   HGB 11.7 10.9* 10.8*   MCV 97 93 95    209 277     Most Recent 3 BMP's:Recent Labs   Lab Test 08/18/21  1327 06/15/21  1031 11/19/20  1455    140 139   POTASSIUM 4.7 4.4 4.3   CHLORIDE 110* 109* 105   CO2 23 23 24   BUN 40* 26 38*   CR 1.46* 1.15* 1.84*   ANIONGAP 7 8 10   SLIME 9.7 9.1 9.6   GLC 84 66* 101       ASSESSMENT/PLAN:  (F41.9) Anxiety  (primary encounter diagnosis)  Comment: ongoing s/s. Agitation at times, aggressive behaviors at times  Plan: 1. Cont. Prn ativan  2. Cont. remeron  3. Left message for son to discuss trial of seroquel vs sched. Ativan  4. Reassess over next few days    (M54.50) Midline low back pain without sciatica, unspecified chronicity  Comment: improving  Plan: 1. Cont. Tylenol  2. Cont. lidoderm patch  3. Cont. To assist with repositioning in bed throughout the night  4. Encourage to sit up during the day, propel self in w.c.    (K21.9) Gastroesophageal reflux disease, unspecified whether esophagitis present  Comment: currently stable.   Plan: 1. Cont. prilosec  2. Monitor for reports of nausea  3. For increased s/s may add prn maalox     Electronically signed by:  JAYLAN Morales CNP                 Sincerely,        JAYLAN Morales CNP

## 2021-10-23 DIAGNOSIS — R52 PAIN: Primary | ICD-10-CM

## 2021-10-25 RX ORDER — LIDOCAINE 50 MG/G
PATCH TOPICAL
Qty: 30 PATCH | Refills: 11 | Status: SHIPPED | OUTPATIENT
Start: 2021-10-25

## 2021-10-29 DIAGNOSIS — R52 PAIN: Primary | ICD-10-CM

## 2021-10-29 RX ORDER — PSEUDOEPHED/ACETAMINOPH/DIPHEN 30MG-500MG
TABLET ORAL
Qty: 112 TABLET | Status: SHIPPED | OUTPATIENT
Start: 2021-10-29

## 2021-11-08 ENCOUNTER — ASSISTED LIVING VISIT (OUTPATIENT)
Dept: GERIATRICS | Facility: CLINIC | Age: 81
End: 2021-11-08
Payer: COMMERCIAL

## 2021-11-08 VITALS
HEART RATE: 73 BPM | SYSTOLIC BLOOD PRESSURE: 127 MMHG | DIASTOLIC BLOOD PRESSURE: 69 MMHG | RESPIRATION RATE: 18 BRPM | OXYGEN SATURATION: 93 %

## 2021-11-08 DIAGNOSIS — M54.50 MIDLINE LOW BACK PAIN WITHOUT SCIATICA, UNSPECIFIED CHRONICITY: ICD-10-CM

## 2021-11-08 DIAGNOSIS — I10 ESSENTIAL HYPERTENSION: ICD-10-CM

## 2021-11-08 DIAGNOSIS — F41.9 ANXIETY: Primary | ICD-10-CM

## 2021-11-08 RX ORDER — LORAZEPAM 0.5 MG/1
0.25 TABLET ORAL DAILY
Qty: 60 TABLET | Refills: 5 | Status: SHIPPED | OUTPATIENT
Start: 2021-11-08 | End: 2021-12-13

## 2021-11-08 NOTE — LETTER
11/8/2021        RE: Judith M Trierweiler  [address] [place] [address]        [place] [facility]  [place] [facility] Record Number:  [id number]  Place of Service where encounter took place:  [employer]  [employer] ([facility]) [[id number]]  Chief Complaint   Patient presents with     Anxiety       HPI:    Judith M Trierweiler  is a 81 year old (1940), who is being seen today for an episodic care visit.  HPI information obtained from: facility chart records, facility staff, patient report, [facility] chart review and family/first contact son report. Today's concern is: anxiety, back pain, HTN. Has alzheimer's with memory loss. Has ongoing aggressive behaviors at times towards staff, typically during cares. Verbally inappropriate behaviors towards peers at times.  Cont. On remeron. Has prn ativan which is typically effective, however does refuse meds at times when agitated.  Did discuss behaviors with son. Agrees to sched. Am ativan dose.  Low back pain has been gen. Stable. Since starting lidoderm patch.  Also taking tylenol. For HTN cont. On norvasc, lisinopril, metoprolol.  BPs, HRs stable.       Past Medical and Surgical History reviewed in Epic today.    MEDICATIONS:          REVIEW OF SYSTEMS:  Unobtainable secondary to cognitive impairment. Reports feeling ok today    Objective:  /69   Pulse 73   Resp 18   SpO2 93%   Exam:  GENERAL APPEARANCE:  Alert, in no distress, cooperative  ENT:  Mouth and posterior oropharynx normal, moist mucous membranes, [place]  EYES:  EOM, conjunctivae, lids, pupils and irises normal, PERRL  NECK:  FROM  RESP:  respiratory effort and palpation of chest normal, lungs clear to auscultation , no respiratory distress  CV:  Palpation and auscultation of heart done , regular rate and rhythm, no murmur, rub, or gallop, no edema  ABDOMEN:  normal bowel sounds, soft, nontender, no hepatosplenomegaly or other masses, no guarding or rebound  M/S:   muscle  strength 5/5 UEs, gen. LE weakness.  no apparent pain with PROM LEs  NEURO:   Cranial nerves 2-12 are normal tested and grossly at patient's baseline, no tremor  PSYCH:  insight and judgement impaired, memory impaired , affect abnormal -flat    Labs:     Most Recent 3 BMP's:Recent Labs   Lab Test 08/18/21  1327 06/15/21  1031 11/19/20  1455    140 139   POTASSIUM 4.7 4.4 4.3   CHLORIDE 110* 109* 105   CO2 23 23 24   BUN 40* 26 38*   CR 1.46* 1.15* 1.84*   ANIONGAP 7 8 10   SLIME 9.7 9.1 9.6   GLC 84 66* 101       ASSESSMENT/PLAN:  (F41.9) Anxiety  (primary encounter diagnosis)  Comment: ongoing.  Aggressive behaviors towards staff  Plan: LORazepam (ATIVAN) 0.5 MG tablet        1. Sched. Ativan every day, cont. Prn ativan  2. Reassess mood, behaviors over next week  3. If not improved, will again discuss seroquel with son  4. Cont. Remeron.     (M54.50) Midline low back pain without sciatica, unspecified chronicity  Comment: gen. Stable.   Plan: 1. Cont. Tylenol  2. Cont. lidoderm patch  3. Cont. To reposition throughout the night in bed  4. Encourage to sit up in w.c.    (I10) Essential hypertension  Comment: BPs stable  Plan: 1. Cont. Norvasc, lisinopril, metoprolol  2. Follow BPs, HRs  3. Encourage fluids  4. Bmp in next 2-4 mos    Electronically signed by:  JAYLAN Morales CNP                 Sincerely,        JAYLAN Morales CNP

## 2021-11-08 NOTE — PROGRESS NOTES
[place] [facility]  [place] [facility] Record Number:  [id number]  [facility]  [employer] ([facility] [[id number]]  Chief Complaint   Patient presents with     Anxiety       HPI:    Judith M Trierweiler  is a 81 year old (1940), who is being seen today for an episodic care visit.  HPI information obtained from: facility chart records, facility staff, patient report, [facility] chart review and family/first contact son report. Today's concern is: anxiety, back pain, HTN. Has alzheimer's with memory loss. Has ongoing aggressive behaviors at times towards staff, typically during cares. Verbally inappropriate behaviors towards peers at times.  Cont. On remeron. Has prn ativan which is typically effective, however does refuse meds at times when agitated.  Did discuss behaviors with son. Agrees to sched. Am ativan dose.  Low back pain has been gen. Stable. Since starting lidoderm patch.  Also taking tylenol. For HTN cont. On norvasc, lisinopril, metoprolol.  BPs, HRs stable.       Past Medical and Surgical History reviewed in Epic today.    MEDICATIONS:          REVIEW OF SYSTEMS:  Unobtainable secondary to cognitive impairment. Reports feeling ok today    Objective:  /69   Pulse 73   Resp 18   SpO2 93%   Exam:  GENERAL APPEARANCE:  Alert, in no distress, cooperative  ENT:  Mouth and posterior oropharynx normal, moist mucous membranes, [place]  EYES:  EOM, conjunctivae, lids, pupils and irises normal, PERRL  NECK:  FROM  RESP:  respiratory effort and palpation of chest normal, lungs clear to auscultation , no respiratory distress  CV:  Palpation and auscultation of heart done , regular rate and rhythm, no murmur, rub, or gallop, no edema  ABDOMEN:  normal bowel sounds, soft, nontender, no hepatosplenomegaly or other masses, no guarding or rebound  M/S:   muscle strength 5/5 UEs, gen. LE weakness.  no apparent pain with PROM LEs  NEURO:   Cranial  nerves 2-12 are normal tested and grossly at patient's baseline, no tremor  PSYCH:  insight and judgement impaired, memory impaired , affect abnormal -flat    Labs:     Most Recent 3 BMP's:Recent Labs   Lab Test 08/18/21  1327 06/15/21  1031 11/19/20  1455    140 139   POTASSIUM 4.7 4.4 4.3   CHLORIDE 110* 109* 105   CO2 23 23 24   BUN 40* 26 38*   CR 1.46* 1.15* 1.84*   ANIONGAP 7 8 10   SLIME 9.7 9.1 9.6   GLC 84 66* 101       ASSESSMENT/PLAN:  (F41.9) Anxiety  (primary encounter diagnosis)  Comment: ongoing.  Aggressive behaviors towards staff  Plan: LORazepam (ATIVAN) 0.5 MG tablet        1. Sched. Ativan every day, cont. Prn ativan  2. Reassess mood, behaviors over next week  3. If not improved, will again discuss seroquel with son  4. Cont. Remeron.     (M54.50) Midline low back pain without sciatica, unspecified chronicity  Comment: gen. Stable.   Plan: 1. Cont. Tylenol  2. Cont. lidoderm patch  3. Cont. To reposition throughout the night in bed  4. Encourage to sit up in w.c.    (I10) Essential hypertension  Comment: BPs stable  Plan: 1. Cont. Norvasc, lisinopril, metoprolol  2. Follow BPs, HRs  3. Encourage fluids  4. Bmp in next 2-4 mos    Electronically signed by:  JAYLAN Morales CNP

## 2021-11-11 ENCOUNTER — ASSISTED LIVING VISIT (OUTPATIENT)
Dept: GERIATRICS | Facility: CLINIC | Age: 81
End: 2021-11-11
Payer: COMMERCIAL

## 2021-11-11 VITALS
WEIGHT: 138 LBS | RESPIRATION RATE: 16 BRPM | BODY MASS INDEX: 25.4 KG/M2 | OXYGEN SATURATION: 94 % | DIASTOLIC BLOOD PRESSURE: 67 MMHG | TEMPERATURE: 97.8 F | HEIGHT: 62 IN | HEART RATE: 78 BPM | SYSTOLIC BLOOD PRESSURE: 133 MMHG

## 2021-11-11 DIAGNOSIS — M54.50 MIDLINE LOW BACK PAIN WITHOUT SCIATICA, UNSPECIFIED CHRONICITY: ICD-10-CM

## 2021-11-11 DIAGNOSIS — M62.81 GENERALIZED MUSCLE WEAKNESS: Primary | ICD-10-CM

## 2021-11-11 DIAGNOSIS — F02.818 LATE ONSET ALZHEIMER'S DISEASE WITH BEHAVIORAL DISTURBANCE (H): ICD-10-CM

## 2021-11-11 DIAGNOSIS — G30.1 LATE ONSET ALZHEIMER'S DISEASE WITH BEHAVIORAL DISTURBANCE (H): ICD-10-CM

## 2021-11-11 ASSESSMENT — MIFFLIN-ST. JEOR: SCORE: 1034.21

## 2021-11-11 NOTE — LETTER
"    11/11/2021        RE: Judith M Trierweiler  [address] [place] [address]        [place] [facility]  [place] [facility] Record Number:  [id number]  Place of Service where encounter took place:  [facility] [employer]  [employer] ([facility]) [[id number]]  Chief Complaint   Patient presents with     Fatigue       HPI:    Judith M Trierweiler  is a 81 year old (1940), who is being seen today for an episodic care visit.  HPI information obtained from: facility chart records, facility staff, patient report, [facility] chart review and family/first contact son report. Today's concern is: weakness, low back pain, alzheimer's.  Has gen. Muscle weakness, LE weakness.  Requires assist to reposition in bed. Assist to stand.  Non-amb.  Has recurrent falls out of bed.  Currently has full size bed, staff having difficulty reaching, repositioning resident.  Has chronic low back pain. Reports pain at hs-has improved since staff repositioning throughout the night.  Cont on tylenol, lidocaine patch. For Alzheimer's cont. On remeron, ativan. Started on sched. Ativan in am due to aggressive behaviors with cares-staff reports typically effective.       Past Medical and Surgical History reviewed in Epic today.    MEDICATIONS:          REVIEW OF SYSTEMS:  Unobtainable secondary to cognitive impairment. Reports some back pain this am.    Objective:  /67   Pulse 78   Temp 97.8  F (36.6  C)   Resp 16   Ht 1.575 m (5' 2\")   Wt 62.6 kg (138 lb)   SpO2 94%   BMI 25.24 kg/m    Exam:  GENERAL APPEARANCE:  Alert, in no distress, cooperative  ENT:  Mouth and posterior oropharynx normal, moist mucous membranes, [place]  EYES:  EOM, conjunctivae, lids, pupils and irises normal, PERRL  NECK:  no carotid bruit, FROM  RESP:  respiratory effort and palpation of chest normal, lungs clear to auscultation , no respiratory distress, diminished breath sounds bibasilar  CV:  Palpation and auscultation of heart done , regular rate and " rhythm, no murmur, rub, or gallop, no edema  ABDOMEN:  normal bowel sounds, soft, nontender, no hepatosplenomegaly or other masses, no guarding or rebound  M/S:   muscle strength 5/5 UEs, 3/5 LEs.  requires assist to repposition in bed. 2+ assist to stand-stand/pivot transfer. some low back pain with PROM LEs  NEURO:   Cranial nerves 2-12 are normal tested and grossly at patient's baseline, no tremor  PSYCH:  insight and judgement impaired, memory impaired , affect and mood normal    Labs:     Most Recent 3 CBC's:Recent Labs   Lab Test 08/18/21  1327 06/15/21  1031 11/19/20  1455   WBC 8.6 6.5 9.1   HGB 11.7 10.9* 10.8*   MCV 97 93 95    209 277     Most Recent 3 BMP's:Recent Labs   Lab Test 08/18/21  1327 06/15/21  1031 11/19/20  1455    140 139   POTASSIUM 4.7 4.4 4.3   CHLORIDE 110* 109* 105   CO2 23 23 24   BUN 40* 26 38*   CR 1.46* 1.15* 1.84*   ANIONGAP 7 8 10   SLIME 9.7 9.1 9.6   GLC 84 66* 101       ASSESSMENT/PLAN:  (M62.81) Generalized muscle weakness  (primary encounter diagnosis)  Comment: ongoing.  Assist to reposition in bed. Assist to stand.  Plan: 1. Refer to PT, order hosp. Bed.  2. Monitor for further increased diff. With transfers  3. Monitor for falls  4. Monitor for skin breakdown    (M54.50) Midline low back pain without sciatica, unspecified chronicity  Comment: ongoing. Back pain at hs at times  Plan: 1. Order hosp. Bed as above  2. Cont. Tylenol  3. Cont. lidoderm patch  4. Cont. Prn aspercreme  5. Encourage to sit up in chair more during the day    (G30.1,  F02.81) Late onset Alzheimer's disease with behavioral disturbance (H)  Comment: memory loss. Aggressive behaviors at times  Plan: 1. Cont. remeron  2. Cont. Sched. Prn ativan  3. Monitor for further increase in aggressive behaviors    Electronically signed by:  JAYLAN Morales CNP         Documentation of Face-to-Face and Certification for Home Health Services     Patient: Judith M Trierweiler   Date of Birth:  1940  MR Number: [id number]  Today's Date: 11/11/2021    I certify that patient: Judith M Trierweiler is under my care and that I, or a nurse practitioner or physician's assistant working with me, had a face-to-face encounter that meets the physician face-to-face encounter requirements with this patient on: 11/11/2021.    This encounter with the patient was in whole, or in part, for the following medical condition, which is the primary reason for home health care: LE weakness, hospital bed.    I certify that, based on my findings, the following services are medically necessary home health services: Physical Therapy.    My clinical findings support the need for the above services because: Physical Therapy Services are needed to assess and treat the following functional impairments: LE weakness, hosp. bed.    Further, I certify that my clinical findings support that this patient is homebound (i.e. absences from home require considerable and taxing effort and are for medical reasons or [employer] or infrequently or of short duration when for other reasons) because: Requires assistance of another person or specialized equipment to access medical services because patient: Requires supervision of another for safe transfer...    Based on the above findings. I certify that this patient is confined to the home and needs intermittent skilled nursing care, physical therapy and/or speech therapy.  The patient is under my care, and I have initiated the establishment of the plan of care.  This patient will be followed by a physician who will periodically review the plan of care.  Physician/Provider to provide follow up care: Rylan Farris    Responsible Medicare certified PECOS Physician: Vale Lawrence  Physician Signature: See electronic signature associated with these discharge orders.  Date: 11/11/2021      Face to Face and Medical Necessity Statement for DME Provider visit    Demographic Information on  Judith M Trierweiler:  Gender: female  : 1940  [address] [facility] [address]  [phone] (home)     Medical Record: [id number]  Social Security Number: [id number]  Primary Care Provider: Rylan Farris  Insurance: Payor: [facility] / Plan: [facility] MEDICARE / Product Type: HMO /     HPI:   Judith M Trierweiler is a 81 year old  (1940), who is being seen today for a face to face provider visit at [facility]; medical necessity statement for DME included. This patient requires the following:  DME Ordered and Medical Necessity Statement   The patient does  require positioning of the body in ways not feasible with an ordinary bed due to a medical condition that is expected to last at least 1 month due to low back pain.   The patient does  require, for the alleviation of pain, postioning of the body in ways not feasible with an ordinary bed.   The patient does not require the head of bed elevated more than 30* most of the time due to CHF, chronic pulmonary disease or aspiration.  The patient does not require traction that can only be attached to a hospital bed.  The patient does  require a bed height different than a fixed height hospital bed to permit tranfers to wheelchair or standing position.   The patient does  require frequent or immediate changes in body position due to back pain.     Fully electric Hospital bed    Pt needing above DME with expected length of need of 99mos due to medical necessity associated with following diagnosis:     Generalized muscle weakness  Midline low back pain without sciatica, unspecified chronicity  Late onset Alzheimer's disease with behavioral disturbance (H)      PM   has a past medical history of Arthritis, Cataract, Cerebral infarction (H), CKD (chronic kidney disease), Essential hypertension, benign, Falls frequently, Gastroesophageal reflux disease, Gout, Hemorrhoid, History of duodenal ulcer, History of DVT (deep vein thrombosis), History of GI bleed,  "Hyperlipidemia, Macular degeneration, Psoriasis, and Vitreous hemorrhage (H).      EXAM  Vitals: /67   Pulse 78   Temp 97.8  F (36.6  C)   Resp 16   Ht 1.575 m (5' 2\")   Wt 62.6 kg (138 lb)   SpO2 94%   BMI 25.24 kg/m  ;BMI= Body mass index is 25.24 kg/m .       ASSESSMENT/PLAN:  1. Generalized muscle weakness    2. Midline low back pain without sciatica, unspecified chronicity    3. Late onset Alzheimer's disease with behavioral disturbance (H)        Orders:  1. Facility staff/TC to contact DME company to get their order form for provider to fill out    ELECTRONICALLY SIGNED BY ABI CERTIFIED PROVIDER:  JAYLAN Morales CNP   NPI: [id number]  [facility]  [address] [place] [address], [address]  [place] [address]                Sincerely,        JAYLAN Morales CNP    "

## 2021-11-11 NOTE — PROGRESS NOTES
"[place] [facility]  [place] Medical Record Number:  [id number]  Place of Service where encounter took place:  [facility]  [facility] ([facility]) [[id number]]  Chief Complaint   Patient presents with     Fatigue       HPI:    Judith M Trierweiler  is a 81 year old (1940), who is being seen today for an episodic care visit.  HPI information obtained from: facility chart records, facility staff, patient report, [facility] chart review and family/first contact son report. Today's concern is: weakness, low back pain, alzheimer's.  Has gen. Muscle weakness, LE weakness.  Requires assist to reposition in bed. Assist to stand.  Non-amb.  Has recurrent falls out of bed.  Currently has full size bed, staff having difficulty reaching, repositioning resident.  Has chronic low back pain. Reports pain at hs-has improved since staff repositioning throughout the night.  Cont on tylenol, lidocaine patch. For Alzheimer's cont. On remeron, ativan. Started on sched. Ativan in am due to aggressive behaviors with cares-staff reports typically effective.       Past Medical and Surgical History reviewed in Epic today.    MEDICATIONS:          REVIEW OF SYSTEMS:  Unobtainable secondary to cognitive impairment. Reports some back pain this am.    Objective:  /67   Pulse 78   Temp 97.8  F (36.6  C)   Resp 16   Ht 1.575 m (5' 2\")   Wt 62.6 kg (138 lb)   SpO2 94%   BMI 25.24 kg/m    Exam:  GENERAL APPEARANCE:  Alert, in no distress, cooperative  ENT:  Mouth and posterior oropharynx normal, moist mucous membranes, [place]  EYES:  EOM, conjunctivae, lids, pupils and irises normal, PERRL  NECK:  no carotid bruit, FROM  RESP:  respiratory effort and palpation of chest normal, lungs clear to auscultation , no respiratory distress, diminished breath sounds bibasilar  CV:  Palpation and auscultation of heart done , regular rate and rhythm, no murmur, rub, or gallop, no edema  ABDOMEN:  normal bowel sounds, soft, " nontender, no hepatosplenomegaly or other masses, no guarding or rebound  M/S:   muscle strength 5/5 UEs, 3/5 LEs.  requires assist to repposition in bed. 2+ assist to stand-stand/pivot transfer. some low back pain with PROM LEs  NEURO:   Cranial nerves 2-12 are normal tested and grossly at patient's baseline, no tremor  PSYCH:  insight and judgement impaired, memory impaired , affect and mood normal    Labs:     Most Recent 3 CBC's:Recent Labs   Lab Test 08/18/21  1327 06/15/21  1031 11/19/20  1455   WBC 8.6 6.5 9.1   HGB 11.7 10.9* 10.8*   MCV 97 93 95    209 277     Most Recent 3 BMP's:Recent Labs   Lab Test 08/18/21  1327 06/15/21  1031 11/19/20  1455    140 139   POTASSIUM 4.7 4.4 4.3   CHLORIDE 110* 109* 105   CO2 23 23 24   BUN 40* 26 38*   CR 1.46* 1.15* 1.84*   ANIONGAP 7 8 10   SLIME 9.7 9.1 9.6   GLC 84 66* 101       ASSESSMENT/PLAN:  (M62.81) Generalized muscle weakness  (primary encounter diagnosis)  Comment: ongoing.  Assist to reposition in bed. Assist to stand.  Plan: 1. Refer to PT, order hosp. Bed.  2. Monitor for further increased diff. With transfers  3. Monitor for falls  4. Monitor for skin breakdown    (M54.50) Midline low back pain without sciatica, unspecified chronicity  Comment: ongoing. Back pain at hs at times  Plan: 1. Order hosp. Bed as above  2. Cont. Tylenol  3. Cont. lidoderm patch  4. Cont. Prn aspercreme  5. Encourage to sit up in chair more during the day    (G30.1,  F02.81) Late onset Alzheimer's disease with behavioral disturbance (H)  Comment: memory loss. Aggressive behaviors at times  Plan: 1. Cont. remeron  2. Cont. Sched. Prn ativan  3. Monitor for further increase in aggressive behaviors    Electronically signed by:  JAYLAN Morales CNP         Documentation of Face-to-Face and Certification for Home Health Services     Patient: Judith M Trierweiler   YOB: 1940  MR Number: [id number]  Today's Date: 11/11/2021    I certify that  patient: Judith M Trierweiler is under my care and that I, or a nurse practitioner or physician's assistant working with me, had a face-to-face encounter that meets the physician face-to-face encounter requirements with this patient on: 2021.    This encounter with the patient was in whole, or in part, for the following medical condition, which is the primary reason for home health care: LE weakness, hospital bed.    I certify that, based on my findings, the following services are medically necessary home health services: Physical Therapy.    My clinical findings support the need for the above services because: Physical Therapy Services are needed to assess and treat the following functional impairments: LE weakness, hosp. bed.    Further, I certify that my clinical findings support that this patient is homebound (i.e. absences from home require considerable and taxing effort and are for medical reasons or Hindu services or infrequently or of short duration when for other reasons) because: Requires assistance of another person or specialized equipment to access medical services because patient: Requires supervision of another for safe transfer...    Based on the above findings. I certify that this patient is confined to the home and needs intermittent skilled nursing care, physical therapy and/or speech therapy.  The patient is under my care, and I have initiated the establishment of the plan of care.  This patient will be followed by a physician who will periodically review the plan of care.  Physician/Provider to provide follow up care: Rylan Farris    Responsible Medicare certified ABI Physician: Vale Lawrence  Physician Signature: See electronic signature associated with these discharge orders.  Date: 2021      Face to Face and Medical Necessity Statement for DME Provider visit    Demographic Information on Judith M Trierweiler:  Gender: female  : 1940  [address]  [place] [address]  [phone] (home)     Medical Record: [id number]  Social Security Number: [id number]  Primary Care Provider: Rylan Farris  Insurance: Payor: [facility] / Plan: [employer] MEDICARE / Product Type: HMO /     HPI:   Judith M Trierweiler is a 81 year old  (1940), who is being seen today for a face to face provider visit at [facility]; medical necessity statement for DME included. This patient requires the following:  DME Ordered and Medical Necessity Statement   The patient does  require positioning of the body in ways not feasible with an ordinary bed due to a medical condition that is expected to last at least 1 month due to low back pain.   The patient does  require, for the alleviation of pain, postioning of the body in ways not feasible with an ordinary bed.   The patient does not require the head of bed elevated more than 30* most of the time due to CHF, chronic pulmonary disease or aspiration.  The patient does not require traction that can only be attached to a hospital bed.  The patient does  require a bed height different than a fixed height hospital bed to permit tranfers to wheelchair or standing position.   The patient does  require frequent or immediate changes in body position due to back pain.     Fully electric Hospital bed    Pt needing above DME with expected length of need of 99mos due to medical necessity associated with following diagnosis:     Generalized muscle weakness  Midline low back pain without sciatica, unspecified chronicity  Late onset Alzheimer's disease with behavioral disturbance (H)      PMH   has a past medical history of Arthritis, Cataract, Cerebral infarction (H), CKD (chronic kidney disease), Essential hypertension, benign, Falls frequently, Gastroesophageal reflux disease, Gout, Hemorrhoid, History of duodenal ulcer, History of DVT (deep vein thrombosis), History of GI bleed, Hyperlipidemia, Macular degeneration, Psoriasis, and Vitreous hemorrhage  "(H).      EXAM  Vitals: /67   Pulse 78   Temp 97.8  F (36.6  C)   Resp 16   Ht 1.575 m (5' 2\")   Wt 62.6 kg (138 lb)   SpO2 94%   BMI 25.24 kg/m  ;BMI= Body mass index is 25.24 kg/m .       ASSESSMENT/PLAN:  1. Generalized muscle weakness    2. Midline low back pain without sciatica, unspecified chronicity    3. Late onset Alzheimer's disease with behavioral disturbance (H)        Orders:  1. Facility staff/TC to contact DME company to get their order form for provider to fill out    ELECTRONICALLY SIGNED BY ABI CERTIFIED PROVIDER:  JAYLAN Morales CNP   NPI: [id number]  [place] [facility]  [address] [place] [address], [address]  [place] [address]          "

## 2021-11-22 ENCOUNTER — ASSISTED LIVING VISIT (OUTPATIENT)
Dept: GERIATRICS | Facility: CLINIC | Age: 81
End: 2021-11-22
Payer: COMMERCIAL

## 2021-11-22 VITALS
DIASTOLIC BLOOD PRESSURE: 66 MMHG | SYSTOLIC BLOOD PRESSURE: 121 MMHG | OXYGEN SATURATION: 93 % | HEART RATE: 76 BPM | RESPIRATION RATE: 18 BRPM

## 2021-11-22 DIAGNOSIS — F02.818 LATE ONSET ALZHEIMER'S DISEASE WITH BEHAVIORAL DISTURBANCE (H): ICD-10-CM

## 2021-11-22 DIAGNOSIS — M54.50 MIDLINE LOW BACK PAIN WITHOUT SCIATICA, UNSPECIFIED CHRONICITY: ICD-10-CM
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
## 2021-11-22 DIAGNOSIS — G30.1 LATE ONSET ALZHEIMER'S DISEASE WITH BEHAVIORAL DISTURBANCE (H): ICD-10-CM
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
## 2021-11-27 DIAGNOSIS — E56.9 VITAMIN DEFICIENCY: ICD-10-CM

## 2021-11-27 DIAGNOSIS — I10 ESSENTIAL HYPERTENSION: ICD-10-CM

## 2021-11-27 DIAGNOSIS — K59.00 CONSTIPATION, UNSPECIFIED CONSTIPATION TYPE: Primary | ICD-10-CM

## 2021-11-29 ENCOUNTER — ASSISTED LIVING VISIT (OUTPATIENT)
Dept: GERIATRICS | Facility: CLINIC | Age: 81
End: 2021-11-29
Payer: COMMERCIAL

## 2021-11-29 VITALS
OXYGEN SATURATION: 93 % | DIASTOLIC BLOOD PRESSURE: 69 MMHG | RESPIRATION RATE: 16 BRPM | HEART RATE: 88 BPM | SYSTOLIC BLOOD PRESSURE: 122 MMHG

## 2021-11-29 DIAGNOSIS — G30.1 LATE ONSET ALZHEIMER'S DISEASE WITH BEHAVIORAL DISTURBANCE (H): ICD-10-CM

## 2021-11-29 DIAGNOSIS — F02.818 LATE ONSET ALZHEIMER'S DISEASE WITH BEHAVIORAL DISTURBANCE (H): ICD-10-CM

## 2021-11-29 DIAGNOSIS — M54.50 MIDLINE LOW BACK PAIN WITHOUT SCIATICA, UNSPECIFIED CHRONICITY: Primary | ICD-10-CM

## 2021-11-29 DIAGNOSIS — K59.00 CONSTIPATION, UNSPECIFIED CONSTIPATION TYPE: ICD-10-CM

## 2021-11-29 RX ORDER — CHOLECALCIFEROL (VITAMIN D3) 25 MCG
TABLET ORAL
Qty: 56 TABLET | Refills: 97 | Status: SHIPPED | OUTPATIENT
Start: 2021-11-29

## 2021-11-29 RX ORDER — AMLODIPINE BESYLATE 5 MG/1
TABLET ORAL
Qty: 28 TABLET | Refills: 97 | Status: SHIPPED | OUTPATIENT
Start: 2021-11-29 | End: 2021-12-13

## 2021-11-29 RX ORDER — ASPIRIN 81 MG
TABLET,CHEWABLE ORAL
Qty: 28 TABLET | Refills: 97 | Status: SHIPPED | OUTPATIENT
Start: 2021-11-29

## 2021-11-29 RX ORDER — GABAPENTIN 100 MG/1
100 CAPSULE ORAL 2 TIMES DAILY
COMMUNITY
End: 2021-12-28

## 2021-11-29 RX ORDER — RISPERIDONE 0.25 MG/1
0.25 TABLET ORAL 2 TIMES DAILY
COMMUNITY
End: 2021-12-28

## 2021-11-29 RX ORDER — LISINOPRIL 20 MG/1
TABLET ORAL
Qty: 28 TABLET | Refills: 97 | Status: SHIPPED | OUTPATIENT
Start: 2021-11-29 | End: 2021-12-13

## 2021-11-29 RX ORDER — DOCUSATE SODIUM 50MG AND SENNOSIDES 8.6MG 8.6; 5 MG/1; MG/1
TABLET, FILM COATED ORAL
Qty: 12 TABLET | Refills: 97 | Status: SHIPPED | OUTPATIENT
Start: 2021-11-29

## 2021-11-29 NOTE — LETTER
11/29/2021        RE: Judith M Trierweiler  [address] [place] [address]        [place] [facility]  [place] Medical Record Number:  [id number]  Place of Service where encounter took place:  [facility]  [employer] ([facility]) [[id number]]  Chief Complaint   Patient presents with     Back Pain     Dementia       HPI:    Judith M Trierweiler  is a 81 year old (1940), who is being seen today for an episodic care visit.  HPI information obtained from: facility chart records, facility staff, patient report, [facility] chart review and family/first contact son report. Today's concern is: low back pain, alzheimer's, constipation.  Has had recurrent falls. Typically attempts to self-transfer.  Fall 11/20/21 in bathroom with attempted self-transfer. No reports of increased pain at that time.  Does have PT ordered, not yet started.  Reports back pain remains gen. Stable.  Cont. On tylenol, lidocaine patch to area. Earlier this month has some increase in constipation.  Started on sched. Senna which has been effective. Has Alzheimer's with aggressive behaviors at times. Cont. On remeron, ativan. Spoke with son who does agree with plan to start risperdal, gabapentin.       Past Medical and Surgical History reviewed in Epic today.    MEDICATIONS:          REVIEW OF SYSTEMS:  Unobtainable secondary to cognitive impairment. Reports back feels ok today    Objective:  /69   Pulse 88   Resp 16   SpO2 93%   Exam:  GENERAL APPEARANCE:  Alert, in no distress  ENT:  Mouth and posterior oropharynx normal, moist mucous membranes, [place]  EYES:  EOM, conjunctivae, lids, pupils and irises normal, PERRL  NECK:  FROM  RESP:  respiratory effort and palpation of chest normal, lungs clear to auscultation , no respiratory distress  CV:  Palpation and auscultation of heart done , regular rate and rhythm, no murmur, rub, or gallop, no edema  ABDOMEN:  normal bowel sounds, soft, nontender, no hepatosplenomegaly or  other masses, no guarding or rebound  M/S:   muscle strength 5/5 UEs, 4/5 LEs, normal tone  NEURO:   Cranial nerves 2-12 are normal tested and grossly at patient's baseline, no tremor  PSYCH:  insight and judgement impaired, memory impaired , affect and mood normal    Labs:     Most Recent 3 BMP's:Recent Labs   Lab Test 08/18/21  1327 06/15/21  1031 11/19/20  1455    140 139   POTASSIUM 4.7 4.4 4.3   CHLORIDE 110* 109* 105   CO2 23 23 24   BUN 40* 26 38*   CR 1.46* 1.15* 1.84*   ANIONGAP 7 8 10   SLIME 9.7 9.1 9.6   GLC 84 66* 101       ASSESSMENT/PLAN:  (M54.50) Midline low back pain without sciatica, unspecified chronicity  (primary encounter diagnosis)  Comment: currently stable.  No apparent exac. With last fall  Plan: 1. Cont tylenol, lidoderm patch  2. Cont. Freq. Repositioning while in bed  3. PT to start    (G30.1,  F02.81) Late onset Alzheimer's disease with behavioral disturbance (H)  Comment: ongoing aggressive behaviors at times, labile mood at times  Plan: 1. Cont. remeron  2. Cont. Sched, prn ativan  3. Start risperdal 0.25 mg bid  4. Start gabapentin 100 mg bid  5. Reassess mood, behaviors over next 2 weeks  6. Monitor for increased daytime sleepiness    (K59.00) Constipation, unspecified constipation type  Comment: currently stable  Plan: 1. Cont. Sched, prn senna  2. Encourage fluids  3. Bmp next month    Electronically signed by:  JAYLAN Morales CNP                 Sincerely,        JAYLAN Morales CNP

## 2021-11-29 NOTE — PROGRESS NOTES
[place] [facility]  [place] Medical Record Number:  [id number]  [facility]  [employer] [facility] [[id number]]  Chief Complaint   Patient presents with     Back Pain     Dementia       HPI:    Judith M Trierweiler  is a 81 year old (1940), who is being seen today for an episodic care visit.  HPI information obtained from: facility chart records, facility staff, patient report, [facility] chart review and family/first contact son report. Today's concern is: low back pain, alzheimer's, constipation.  Has had recurrent falls. Typically attempts to self-transfer.  Fall 11/20/21 in bathroom with attempted self-transfer. No reports of increased pain at that time.  Does have PT ordered, not yet started.  Reports back pain remains gen. Stable.  Cont. On tylenol, lidocaine patch to area. Earlier this month has some increase in constipation.  Started on sched. Senna which has been effective. Has Alzheimer's with aggressive behaviors at times. Cont. On remeron, ativan. Spoke with son who does agree with plan to start risperdal, gabapentin.       Past Medical and Surgical History reviewed in Epic today.    MEDICATIONS:          REVIEW OF SYSTEMS:  Unobtainable secondary to cognitive impairment. Reports back feels ok today    Objective:  /69   Pulse 88   Resp 16   SpO2 93%   Exam:  GENERAL APPEARANCE:  Alert, in no distress  ENT:  Mouth and posterior oropharynx normal, moist mucous membranes, [place]  EYES:  EOM, conjunctivae, lids, pupils and irises normal, PERRL  NECK:  FROM  RESP:  respiratory effort and palpation of chest normal, lungs clear to auscultation , no respiratory distress  CV:  Palpation and auscultation of heart done , regular rate and rhythm, no murmur, rub, or gallop, no edema  ABDOMEN:  normal bowel sounds, soft, nontender, no hepatosplenomegaly or other masses, no guarding or rebound  M/S:   muscle strength 5/5 UEs, 4/5 LEs, normal  tone  NEURO:   Cranial nerves 2-12 are normal tested and grossly at patient's baseline, no tremor  PSYCH:  insight and judgement impaired, memory impaired , affect and mood normal    Labs:     Most Recent 3 BMP's:Recent Labs   Lab Test 08/18/21  1327 06/15/21  1031 11/19/20  1455    140 139   POTASSIUM 4.7 4.4 4.3   CHLORIDE 110* 109* 105   CO2 23 23 24   BUN 40* 26 38*   CR 1.46* 1.15* 1.84*   ANIONGAP 7 8 10   SLIME 9.7 9.1 9.6   GLC 84 66* 101       ASSESSMENT/PLAN:  (M54.50) Midline low back pain without sciatica, unspecified chronicity  (primary encounter diagnosis)  Comment: currently stable.  No apparent exac. With last fall  Plan: 1. Cont tylenol, lidoderm patch  2. Cont. Freq. Repositioning while in bed  3. PT to start    (G30.1,  F02.81) Late onset Alzheimer's disease with behavioral disturbance (H)  Comment: ongoing aggressive behaviors at times, labile mood at times  Plan: 1. Cont. remeron  2. Cont. Sched, prn ativan  3. Start risperdal 0.25 mg bid  4. Start gabapentin 100 mg bid  5. Reassess mood, behaviors over next 2 weeks  6. Monitor for increased daytime sleepiness    (K59.00) Constipation, unspecified constipation type  Comment: currently stable  Plan: 1. Cont. Sched, prn senna  2. Encourage fluids  3. Bmp next month    Electronically signed by:  JAYLAN Morales CNP

## 2021-12-05 ENCOUNTER — TELEPHONE (OUTPATIENT)
Dept: GERIATRICS | Facility: CLINIC | Age: 81
End: 2021-12-05
Payer: COMMERCIAL

## 2021-12-05 NOTE — TELEPHONE ENCOUNTER
Nursing called to report a fall from Saturday.  She slept in today and so when nursing checked in on her she was has been doing fine.    Bruise and scrap found but otherwise no concerns.  Nursing asked NP to send a note to primary NP so he can check in on her this week.    No new orders from this NP.    Electronically signed by Tiana Robbins RN, CNP

## 2021-12-06 ENCOUNTER — ASSISTED LIVING VISIT (OUTPATIENT)
Dept: GERIATRICS | Facility: CLINIC | Age: 81
End: 2021-12-06
Payer: COMMERCIAL

## 2021-12-06 VITALS
HEART RATE: 57 BPM | TEMPERATURE: 97.7 F | RESPIRATION RATE: 16 BRPM | DIASTOLIC BLOOD PRESSURE: 68 MMHG | OXYGEN SATURATION: 98 % | SYSTOLIC BLOOD PRESSURE: 124 MMHG

## 2021-12-06 DIAGNOSIS — R29.6 RECURRENT FALLS: Primary | ICD-10-CM

## 2021-12-06 DIAGNOSIS — G30.1 LATE ONSET ALZHEIMER'S DISEASE WITH BEHAVIORAL DISTURBANCE (H): ICD-10-CM

## 2021-12-06 DIAGNOSIS — F02.818 LATE ONSET ALZHEIMER'S DISEASE WITH BEHAVIORAL DISTURBANCE (H): ICD-10-CM

## 2021-12-06 DIAGNOSIS — K21.9 GASTROESOPHAGEAL REFLUX DISEASE, UNSPECIFIED WHETHER ESOPHAGITIS PRESENT: ICD-10-CM

## 2021-12-06 NOTE — LETTER
12/6/2021        RE: Judith M Trierweiler  [address]  [place] [address]        [place] [facility]  [place] Medical Record Number:  [id number]  [facility]  [employer] ([facility] [[id number]]  Chief Complaint   Patient presents with     Fall       HPI:    Judith M Trierweiler  is a 81 year old (1940), who is being seen today for an episodic care visit.  HPI information obtained from: facility chart records, facility staff, patient report and [facility] chart review. Today's concern is: falls, alzheimer's, GERD.  S/p fall 12/4/21. Found on floor.  No apparent inj.  No reports of pain.  Cont. On tylenol, lidoderm patch for chronic low back pain.  Has had ongoing aggressive behaviors at times.  Started on bid gabapentin, risperdal.  Since this time, no reports of aggressive behaviors. Also taking remeron, ativan. For GERD cont. On prilosec.  Per staff, po intake has been stable.  No recent reports of nausea.       Past Medical and Surgical History reviewed in Epic today.    MEDICATIONS:          REVIEW OF SYSTEMS:  Unobtainable secondary to cognitive impairment. Reports no pain today    Objective:  /68   Pulse 57   Temp 97.7  F (36.5  C)   Resp 16   SpO2 98%   Exam:  GENERAL APPEARANCE:  Alert, in no distress, cooperative  ENT:  Mouth and posterior oropharynx normal, moist mucous membranes, [place]  EYES:  EOM, conjunctivae, lids, pupils and irises normal, PERRL  RESP:  respiratory effort and palpation of chest normal, lungs clear to auscultation , no respiratory distress, diminished breath sounds bibasilar  CV:  Palpation and auscultation of heart done , regular rate and rhythm, no murmur, rub, or gallop, no edema  ABDOMEN:  normal bowel sounds, soft, nontender, no hepatosplenomegaly or other masses, no guarding or rebound  M/S:   muscle strength 5/5 UEs, gen. LE weakness, normal muscle tone  NEURO:   Cranial nerves 2-12 are normal tested  and grossly at patient's baseline, no tremor  PSYCH:  insight and judgement impaired, memory impaired , affect abnormal -flat    Labs:     Most Recent 3 CBC's:Recent Labs   Lab Test 08/18/21  1327 06/15/21  1031 11/19/20  1455   WBC 8.6 6.5 9.1   HGB 11.7 10.9* 10.8*   MCV 97 93 95    209 277     Most Recent 3 BMP's:Recent Labs   Lab Test 08/18/21  1327 06/15/21  1031 11/19/20  1455    140 139   POTASSIUM 4.7 4.4 4.3   CHLORIDE 110* 109* 105   CO2 23 23 24   BUN 40* 26 38*   CR 1.46* 1.15* 1.84*   ANIONGAP 7 8 10   SLIME 9.7 9.1 9.6   GLC 84 66* 101       ASSESSMENT/PLAN:  (R29.6) Recurrent falls  (primary encounter diagnosis)  Comment: ongoing. S/p fall 12/4/21. No apparent inj  Plan: 1. Cont. Staff assisted toilet sched.  2. Monitor for attempts to self-transfer  3. Encourage increase in act. Level as michael  4. Monitor for further increased diff. With transfers    (G30.1,  F02.81) Late onset Alzheimer's disease with behavioral disturbance (H)  Comment: memory loss. No recent aggressive behaivors. No reports of increased sleepiness during the day from baseline  Plan: 1. Cont. Remeron, ativan  2. Cont. Gabapentin, risperdal  3. Reassess mood, behaviors over next couple weeks, may consider discontinue sched. ativan    (K21.9) Gastroesophageal reflux disease, unspecified whether esophagitis present  Comment: currently stable.  Plan: 1. Cont. prilosec  2. Follow po intake  3. Monitor for nausea    Electronically signed by:  JAYLAN Morales CNP                 Sincerely,        JAYLAN Morales CNP

## 2021-12-06 NOTE — PROGRESS NOTES
[place] [facility]  [place] Medical Record Number:  [id number]  Place of Service where encounter took place:  [facility] [email or web address]  [employer] ([facility]) [[id number]]  Chief Complaint   Patient presents with     Fall       HPI:    Judith M Trierweiler  is a 81 year old (1940), who is being seen today for an episodic care visit.  HPI information obtained from: facility chart records, facility staff, patient report and [facility] chart review. Today's concern is: falls, alzheimer's, GERD.  S/p fall 12/4/21. Found on floor.  No apparent inj.  No reports of pain.  Cont. On tylenol, lidoderm patch for chronic low back pain.  Has had ongoing aggressive behaviors at times.  Started on bid gabapentin, risperdal.  Since this time, no reports of aggressive behaviors. Also taking remeron, ativan. For GERD cont. On prilosec.  Per staff, po intake has been stable.  No recent reports of nausea.       Past Medical and Surgical History reviewed in Epic today.    MEDICATIONS:          REVIEW OF SYSTEMS:  Unobtainable secondary to cognitive impairment. Reports no pain today    Objective:  /68   Pulse 57   Temp 97.7  F (36.5  C)   Resp 16   SpO2 98%   Exam:  GENERAL APPEARANCE:  Alert, in no distress, cooperative  ENT:  Mouth and posterior oropharynx normal, moist mucous membranes, [place]  EYES:  EOM, conjunctivae, lids, pupils and irises normal, PERRL  RESP:  respiratory effort and palpation of chest normal, lungs clear to auscultation , no respiratory distress, diminished breath sounds bibasilar  CV:  Palpation and auscultation of heart done , regular rate and rhythm, no murmur, rub, or gallop, no edema  ABDOMEN:  normal bowel sounds, soft, nontender, no hepatosplenomegaly or other masses, no guarding or rebound  M/S:   muscle strength 5/5 UEs, gen. LE weakness, normal muscle tone  NEURO:   Cranial nerves 2-12 are normal tested and grossly at patient's baseline, no tremor  PSYCH:  insight and judgement  impaired, memory impaired , affect abnormal -flat    Labs:     Most Recent 3 CBC's:Recent Labs   Lab Test 08/18/21  1327 06/15/21  1031 11/19/20  1455   WBC 8.6 6.5 9.1   HGB 11.7 10.9* 10.8*   MCV 97 93 95    209 277     Most Recent 3 BMP's:Recent Labs   Lab Test 08/18/21  1327 06/15/21  1031 11/19/20  1455    140 139   POTASSIUM 4.7 4.4 4.3   CHLORIDE 110* 109* 105   CO2 23 23 24   BUN 40* 26 38*   CR 1.46* 1.15* 1.84*   ANIONGAP 7 8 10   SLIME 9.7 9.1 9.6   GLC 84 66* 101       ASSESSMENT/PLAN:  (R29.6) Recurrent falls  (primary encounter diagnosis)  Comment: ongoing. S/p fall 12/4/21. No apparent inj  Plan: 1. Cont. Staff assisted toilet sched.  2. Monitor for attempts to self-transfer  3. Encourage increase in act. Level as michael  4. Monitor for further increased diff. With transfers    (G30.1,  F02.81) Late onset Alzheimer's disease with behavioral disturbance (H)  Comment: memory loss. No recent aggressive behaivors. No reports of increased sleepiness during the day from baseline  Plan: 1. Cont. Remeron, ativan  2. Cont. Gabapentin, risperdal  3. Reassess mood, behaviors over next couple weeks, may consider discontinue sched. ativan    (K21.9) Gastroesophageal reflux disease, unspecified whether esophagitis present  Comment: currently stable.  Plan: 1. Cont. prilosec  2. Follow po intake  3. Monitor for nausea    Electronically signed by:  JAYLAN Morales CNP

## 2021-12-13 ENCOUNTER — ASSISTED LIVING VISIT (OUTPATIENT)
Dept: GERIATRICS | Facility: CLINIC | Age: 81
End: 2021-12-13
Payer: COMMERCIAL

## 2021-12-13 VITALS
DIASTOLIC BLOOD PRESSURE: 65 MMHG | TEMPERATURE: 97.6 F | RESPIRATION RATE: 16 BRPM | SYSTOLIC BLOOD PRESSURE: 110 MMHG | WEIGHT: 140 LBS | BODY MASS INDEX: 25.61 KG/M2 | HEART RATE: 66 BPM | OXYGEN SATURATION: 94 %

## 2021-12-13 DIAGNOSIS — G30.1 LATE ONSET ALZHEIMER'S DISEASE WITH BEHAVIORAL DISTURBANCE (H): ICD-10-CM

## 2021-12-13 DIAGNOSIS — F02.818 LATE ONSET ALZHEIMER'S DISEASE WITH BEHAVIORAL DISTURBANCE (H): ICD-10-CM

## 2021-12-13 DIAGNOSIS — R53.1 LEFT-SIDED WEAKNESS: Primary | ICD-10-CM

## 2021-12-13 DIAGNOSIS — I10 ESSENTIAL HYPERTENSION: ICD-10-CM

## 2021-12-13 RX ORDER — LORAZEPAM 0.5 MG/1
0.25 TABLET ORAL 2 TIMES DAILY PRN
COMMUNITY
End: 2021-12-30

## 2021-12-13 RX ORDER — ATORVASTATIN CALCIUM 20 MG/1
20 TABLET, FILM COATED ORAL DAILY
COMMUNITY
End: 2021-12-28

## 2021-12-13 NOTE — LETTER
12/13/2021        RE: Judith M Trierweiler  [address] [place] [address]        [place] [facility]  PRIMARY CARE PROVIDER AND CLINIC:  JAYLAN Morales CNP, [address] [facility] [address] / [place] [address]  Chief Complaint   Patient presents with     Hospital F/U     [place] Medical Record Number:  [id number]  [facility]  [employer] [email or web address] [[id number]]    Judith M Trierweiler  is a 81 year old  (1940), admitted to the above facility from  [facility] . Hospital stay 12/8/21 through 12/12/21..  Admitted to this facility for  rehab, medical management and nursing care.    HPI:    HPI information obtained from: facility chart records, facility staff, patient report and [facility] chart review.   Brief Summary of Hospital Course:     L sided weakness: 12/8/21 noted to have L sided weakness per staff, facial droop.  Sent to [facility] ED. Head CT neg for acute changes or signs of stroke. Suspect TIA. Neuro consult-tPA not given.  Head MRI, no signs of infarct.  Tele.  No arrhythmias noted. Started on lipitor    HTN: lower BPs in hosp.  Troponin neg.  Lisinopril, norvasc dc'd.  Cont. On metoprolol. CKD stage 3.  BPs stable since return.    Alzheimer's: memory loss. No recent reports of aggressive behaviors since start of risperdal, gabapentin.  Also has ativan, remeron.       Updates on Status Since Skilled nursing Admission: no focal neurological deficits    CODE STATUS/ADVANCE DIRECTIVES DISCUSSION:   DNR / DNI  Patient's living condition: lives in an assisted living facility  ALLERGIES: Lactose and Penicillins  PAST MEDICAL HISTORY:  has a past medical history of Arthritis, Cataract, Cerebral infarction (H), CKD (chronic kidney disease), Essential hypertension, benign, Falls frequently, Gastroesophageal reflux disease, Gout, Hemorrhoid, History of duodenal ulcer, History of DVT (deep vein thrombosis), History of GI bleed,  Hyperlipidemia, Macular degeneration, Psoriasis, and Vitreous hemorrhage (H).  PAST SURGICAL HISTORY:   has a past surgical history that includes NONSPECIFIC PROCEDURE; NONSPECIFIC PROCEDURE; and CATARACT.  FAMILY HISTORY: family history includes Cerebrovascular Disease in her mother; Coronary Artery Disease in her father; Hypertension in her mother; Kidney Disease in her mother.  SOCIAL HISTORY:   reports that she has never smoked. She has never used smokeless tobacco. She reports that she does not drink alcohol and does not use drugs.    Post Discharge Medication Reconciliation Status: discharge medications reconciled and changed, per note/orders          ROS:  Unobtainable secondary to cognitive impairment. Reports feeling fine today    Vitals:  /65   Pulse 66   Temp 97.6  F (36.4  C)   Resp 16   Wt 63.5 kg (140 lb)   SpO2 94%   BMI 25.61 kg/m    Exam:  GENERAL APPEARANCE:  Alert, in no distress  ENT:  Mouth and posterior oropharynx normal, moist mucous membranes, [place]  EYES:  EOM, conjunctivae, lids, pupils and irises normal, PERRL  NECK:  FROM  RESP:  respiratory effort and palpation of chest normal, lungs clear to auscultation , no respiratory distress  CV:  Palpation and auscultation of heart done , regular rate and rhythm, no murmur, rub, or gallop, no edema  ABDOMEN:  normal bowel sounds, soft, nontender, no hepatosplenomegaly or other masses, no guarding or rebound  M/S:   muscle strength 5/5 UEs, gen. LE weakness. muscle strength equal bilat  NEURO:   Cranial nerves 2-12 are normal tested and grossly at patient's baseline, no tremor  PSYCH:  insight and judgement impaired, memory impaired , affect and mood normal    Lab/Diagnostic data:  12/9/21 wbc 9.1, hgb 11.1  Cr. 1.42.  TTE LVEF 65%    ASSESSMENT/PLAN:  (R53.1) Left-sided weakness  (primary encounter diagnosis)  Comment: resolved, thought to be TIA.   Plan: 1. Cont. Asa  2. Cont. lipitor  3. Monitor for chagnes in Neuros  4. PT to [facility]  for hosp. bed    (I10) Essential hypertension  Comment: lower BPs in hosp. CKD. BP currently stable.  Lisinopril, norvasc dc'd  Plan: 1. Cont. Metoprolol  2. Follow BPs, HRs  3. Encourage fluids  4. For elevated BPs, may restart norvasc    (G30.1,  F02.81) Late onset Alzheimer's disease with behavioral disturbance (H)  Comment: no recent aggressive behaviors  Plan: 1. Cont. Risperdal, gabapentin  2. Cont. Prn ativan. Discontinue sched. Ativan  3. Cont. remeron  4. Reassess mood, behaviors later this week      Electronically signed by:  JAYLAN Morales CNP                           Sincerely,        JAYLAN Morales CNP

## 2021-12-13 NOTE — PROGRESS NOTES
[place] [facility]  PRIMARY CARE PROVIDER AND CLINIC:  JAYLAN Morales CNP, [address] / [place] [address]  Chief Complaint   Patient presents with     Hospital F/U     [place] Medical Record Number:  [id number]  [facility]  [employer] ([facility]) [[id number]]    Judith M Trierweiler  is a 81 year old  (1940), admitted to the above facility from  [facility] . Hospital stay 12/8/21 through 12/12/21..  Admitted to this facility for  rehab, medical management and nursing care.    HPI:    HPI information obtained from: facility chart records, facility staff, patient report and [facility] chart review.   Brief Summary of Hospital Course:     L sided weakness: 12/8/21 noted to have L sided weakness per staff, facial droop.  Sent to [facility] ED. Head CT neg for acute changes or signs of stroke. Suspect TIA. Neuro consult-tPA not given.  Head MRI, no signs of infarct.  Tele.  No arrhythmias noted. Started on lipitor    HTN: lower BPs in hosp.  Troponin neg.  Lisinopril, norvasc dc'd.  Cont. On metoprolol. CKD stage 3.  BPs stable since return.    Alzheimer's: memory loss. No recent reports of aggressive behaviors since start of risperdal, gabapentin.  Also has ativan, remeron.       Updates on Status Since Skilled nursing Admission: no focal neurological deficits    CODE STATUS/ADVANCE DIRECTIVES DISCUSSION:   DNR / DNI  Patient's living condition: lives in an assisted living facility  ALLERGIES: Lactose and Penicillins  PAST MEDICAL HISTORY:  has a past medical history of Arthritis, Cataract, Cerebral infarction (H), CKD (chronic kidney disease), Essential hypertension, benign, Falls frequently, Gastroesophageal reflux disease, Gout, Hemorrhoid, History of duodenal ulcer, History of DVT (deep vein thrombosis), History of GI bleed, Hyperlipidemia, Macular degeneration, Psoriasis, and Vitreous hemorrhage (H).  PAST SURGICAL  HISTORY:   has a past surgical history that includes NONSPECIFIC PROCEDURE; NONSPECIFIC PROCEDURE; and CATARACT.  FAMILY HISTORY: family history includes Cerebrovascular Disease in her mother; Coronary Artery Disease in her father; Hypertension in her mother; Kidney Disease in her mother.  SOCIAL HISTORY:   reports that she has never smoked. She has never used smokeless tobacco. She reports that she does not drink alcohol and does not use drugs.    Post Discharge Medication Reconciliation Status: discharge medications reconciled and changed, per note/orders          ROS:  Unobtainable secondary to cognitive impairment. Reports feeling fine today    Vitals:  /65   Pulse 66   Temp 97.6  F (36.4  C)   Resp 16   Wt 63.5 kg (140 lb)   SpO2 94%   BMI 25.61 kg/m    Exam:  GENERAL APPEARANCE:  Alert, in no distress  ENT:  Mouth and posterior oropharynx normal, moist mucous membranes, [place]  EYES:  EOM, conjunctivae, lids, pupils and irises normal, PERRL  NECK:  FROM  RESP:  respiratory effort and palpation of chest normal, lungs clear to auscultation , no respiratory distress  CV:  Palpation and auscultation of heart done , regular rate and rhythm, no murmur, rub, or gallop, no edema  ABDOMEN:  normal bowel sounds, soft, nontender, no hepatosplenomegaly or other masses, no guarding or rebound  M/S:   muscle strength 5/5 UEs, gen. LE weakness. muscle strength equal bilat  NEURO:   Cranial nerves 2-12 are normal tested and grossly at patient's baseline, no tremor  PSYCH:  insight and judgement impaired, memory impaired , affect and mood normal    Lab/Diagnostic data:  12/9/21 wbc 9.1, hgb 11.1  Cr. 1.42.  TTE LVEF 65%    ASSESSMENT/PLAN:  (R53.1) Left-sided weakness  (primary encounter diagnosis)  Comment: resolved, thought to be TIA.   Plan: 1. Cont. Asa  2. Cont. lipitor  3. Monitor for chagnes in Neuros  4. PT to [facility] for hosp. bed    (I10) Essential hypertension  Comment: lower BPs in hosp. CKD. BP currently  stable.  Lisinopril, norvasc dc'd  Plan: 1. Cont. Metoprolol  2. Follow BPs, HRs  3. Encourage fluids  4. For elevated BPs, may restart norvasc    (G30.1,  F02.81) Late onset Alzheimer's disease with behavioral disturbance (H)  Comment: no recent aggressive behaviors  Plan: 1. Cont. Risperdal, gabapentin  2. Cont. Prn ativan. Discontinue sched. Ativan  3. Cont. remeron  4. Reassess mood, behaviors later this week      Electronically signed by:  JAYLAN Morales CNP

## 2021-12-23 ENCOUNTER — ASSISTED LIVING VISIT (OUTPATIENT)
Dept: GERIATRICS | Facility: CLINIC | Age: 81
End: 2021-12-23
Payer: COMMERCIAL

## 2021-12-23 VITALS
HEART RATE: 64 BPM | RESPIRATION RATE: 16 BRPM | SYSTOLIC BLOOD PRESSURE: 127 MMHG | OXYGEN SATURATION: 93 % | DIASTOLIC BLOOD PRESSURE: 64 MMHG

## 2021-12-23 DIAGNOSIS — F41.9 ANXIETY: Primary | ICD-10-CM

## 2021-12-23 DIAGNOSIS — I10 ESSENTIAL HYPERTENSION: ICD-10-CM

## 2021-12-23 DIAGNOSIS — M62.81 GENERALIZED MUSCLE WEAKNESS: ICD-10-CM

## 2021-12-23 NOTE — LETTER
12/23/2021        RE: Judith M Trierweiler  [address] [place] [address]        [place] [facility]  [place] Medical Record Number:  [id number]  Place of Service where encounter took place:  [facility] [employer] ([facility]) [[id number]]  Chief Complaint   Patient presents with     Anxiety       HPI:    Judith M Trierweiler  is a 81 year old (1940), who is being seen today for an episodic care visit.  HPI information obtained from: facility chart records, facility staff, patient report and [facility] chart review. Today's concern is: anxiety, HTN, weakness.  Has alzheimer's with h/o anxiety, agitation, aggressive behaviors.  Recently started on risperdal.  Cont. On prn ativan.  Taking remeron.  Per staff, cont. To have increased anxiety at times, some decrease in aggressive behaviors. Sleeps in late in am. Has occ. Med refusal.  For HTN cont. On metoprolol. During ED visit earlier this month, had lower BPs. Lisinopril, norvasc dc'd.  BPs currently stable. Has ongoing LE weakness. Assist to stand.  Stand/pivot transfers. Working with PT.  Per staff, is able to stand indep. At times.       Past Medical and Surgical History reviewed in Epic today.    MEDICATIONS:          REVIEW OF SYSTEMS:  Unobtainable secondary to cognitive impairment. Reports feeling ok today    Objective:  /64   Pulse 64   Resp 16   SpO2 93%   Exam:  GENERAL APPEARANCE:  Alert, in no distress, cooperative  ENT:  Mouth and posterior oropharynx normal, moist mucous membranes, [place]  EYES:  EOM, conjunctivae, lids, pupils and irises normal, PERRL  RESP:  respiratory effort and palpation of chest normal, lungs clear to auscultation , no respiratory distress  CV:  Palpation and auscultation of heart done , regular rate and rhythm, no murmur, rub, or gallop, no edema  ABDOMEN:  normal bowel sounds, soft, nontender, no hepatosplenomegaly or other masses, no guarding or rebound  M/S:   muscle strength 5/5 UEs, gen. LE  weakness, normal tone  NEURO:   Cranial nerves 2-12 are normal tested and grossly at patient's baseline, no tremor  PSYCH:  insight and judgement impaired, memory impaired , affect and mood normal    Labs:     Most Recent 3 CBC's:Recent Labs   Lab Test 08/18/21  1327 06/15/21  1031 11/19/20  1455   WBC 8.6 6.5 9.1   HGB 11.7 10.9* 10.8*   MCV 97 93 95    209 277     Most Recent 3 BMP's:Recent Labs   Lab Test 08/18/21  1327 06/15/21  1031 11/19/20  1455    140 139   POTASSIUM 4.7 4.4 4.3   CHLORIDE 110* 109* 105   CO2 23 23 24   BUN 40* 26 38*   CR 1.46* 1.15* 1.84*   ANIONGAP 7 8 10   SLIME 9.7 9.1 9.6   GLC 84 66* 101       ASSESSMENT/PLAN:  (F41.9) Anxiety  (primary encounter diagnosis)  Comment: increased s/s at times.  Ongoing resistiveness with cares at times. Sleeps in late in am.  Plan: 1. Cont. risperdal  2. Cont. remeron  3. Cont. Prn ativan  4. Monitor for further increase in med refusal    (I10) Essential hypertension  Comment: BP stable.  Recent low BPs during last hosp. Visit. Lisinopril, norvasc jose'd  Plan: 1. Cont. Metoprolol  2. Follow BPs, HRs  3. For elevated BPs, may consider restart norvasc    (M62.81) Generalized muscle weakness  Comment: ongoing. H/o falls. 2 A with stand pivot transfers.  No recent reports of increased low back pain  Plan: 1. Cont. Tylenol, lidoderm patch to low back  2. Cont. Assisted repositioning in bed throughout the night  3. Cont. PT  4. Monitor for further increased difficulty with transfers.     Electronically signed by:  JAYLAN Morales CNP                 Sincerely,        JAYLAN Morales CNP

## 2021-12-23 NOTE — PROGRESS NOTES
[place] [facility]  [facility] Medical Record Number:  [id number]  Place of Service where encounter took place:  [facility]  [facility] [[id number]]  Chief Complaint   Patient presents with     Anxiety       HPI:    Judith M Trierweiler  is a 81 year old (1940), who is being seen today for an episodic care visit.  HPI information obtained from: facility chart records, facility staff, patient report and [facility] chart review. Today's concern is: anxiety, HTN, weakness.  Has alzheimer's with h/o anxiety, agitation, aggressive behaviors.  Recently started on risperdal.  Cont. On prn ativan.  Taking remeron.  Per staff, cont. To have increased anxiety at times, some decrease in aggressive behaviors. Sleeps in late in am. Has occ. Med refusal.  For HTN cont. On metoprolol. During ED visit earlier this month, had lower BPs. Lisinopril, norvasc dc'd.  BPs currently stable. Has ongoing LE weakness. Assist to stand.  Stand/pivot transfers. Working with PT.  Per staff, is able to stand indep. At times.       Past Medical and Surgical History reviewed in Epic today.    MEDICATIONS:          REVIEW OF SYSTEMS:  Unobtainable secondary to cognitive impairment. Reports feeling ok today    Objective:  /64   Pulse 64   Resp 16   SpO2 93%   Exam:  GENERAL APPEARANCE:  Alert, in no distress, cooperative  ENT:  Mouth and posterior oropharynx normal, moist mucous membranes, [place]  EYES:  EOM, conjunctivae, lids, pupils and irises normal, PERRL  RESP:  respiratory effort and palpation of chest normal, lungs clear to auscultation , no respiratory distress  CV:  Palpation and auscultation of heart done , regular rate and rhythm, no murmur, rub, or gallop, no edema  ABDOMEN:  normal bowel sounds, soft, nontender, no hepatosplenomegaly or other masses, no guarding or rebound  M/S:   muscle strength 5/5 UEs, gen. LE weakness, normal tone  NEURO:   Cranial nerves 2-12 are normal tested and grossly at  patient's baseline, no tremor  PSYCH:  insight and judgement impaired, memory impaired , affect and mood normal    Labs:     Most Recent 3 CBC's:Recent Labs   Lab Test 08/18/21  1327 06/15/21  1031 11/19/20  1455   WBC 8.6 6.5 9.1   HGB 11.7 10.9* 10.8*   MCV 97 93 95    209 277     Most Recent 3 BMP's:Recent Labs   Lab Test 08/18/21  1327 06/15/21  1031 11/19/20  1455    140 139   POTASSIUM 4.7 4.4 4.3   CHLORIDE 110* 109* 105   CO2 23 23 24   BUN 40* 26 38*   CR 1.46* 1.15* 1.84*   ANIONGAP 7 8 10   SLIME 9.7 9.1 9.6   GLC 84 66* 101       ASSESSMENT/PLAN:  (F41.9) Anxiety  (primary encounter diagnosis)  Comment: increased s/s at times.  Ongoing resistiveness with cares at times. Sleeps in late in am.  Plan: 1. Cont. risperdal  2. Cont. remeron  3. Cont. Prn ativan  4. Monitor for further increase in med refusal    (I10) Essential hypertension  Comment: BP stable.  Recent low BPs during last hosp. Visit. Lisinopril, norvasc dc'd  Plan: 1. Cont. Metoprolol  2. Follow BPs, HRs  3. For elevated BPs, may consider restart norvasc    (M62.81) Generalized muscle weakness  Comment: ongoing. H/o falls. 2 A with stand pivot transfers.  No recent reports of increased low back pain  Plan: 1. Cont. Tylenol, lidoderm patch to low back  2. Cont. Assisted repositioning in bed throughout the night  3. Cont. PT  4. Monitor for further increased difficulty with transfers.     Electronically signed by:  JAYLAN Morales CNP

## 2021-12-24 DIAGNOSIS — I10 HYPERTENSION, UNSPECIFIED TYPE: Primary | ICD-10-CM

## 2021-12-24 DIAGNOSIS — F03.91 DEMENTIA WITH BEHAVIORAL DISTURBANCE, UNSPECIFIED DEMENTIA TYPE: ICD-10-CM

## 2021-12-26 ENCOUNTER — LAB REQUISITION (OUTPATIENT)
Dept: LAB | Facility: CLINIC | Age: 81
End: 2021-12-26
Payer: COMMERCIAL

## 2021-12-26 DIAGNOSIS — R79.89 OTHER SPECIFIED ABNORMAL FINDINGS OF BLOOD CHEMISTRY: ICD-10-CM

## 2021-12-28 RX ORDER — ATORVASTATIN CALCIUM 20 MG/1
TABLET, FILM COATED ORAL
Qty: 28 TABLET | Refills: 97 | Status: SHIPPED | OUTPATIENT
Start: 2021-12-28

## 2021-12-28 RX ORDER — RISPERIDONE 0.25 MG/1
TABLET ORAL
Qty: 56 TABLET | Refills: 97 | Status: SHIPPED | OUTPATIENT
Start: 2021-12-28

## 2021-12-28 RX ORDER — GABAPENTIN 100 MG/1
CAPSULE ORAL
Qty: 56 CAPSULE | Refills: 97 | Status: SHIPPED | OUTPATIENT
Start: 2021-12-28 | End: 2022-02-21

## 2021-12-30 ENCOUNTER — TELEPHONE (OUTPATIENT)
Dept: GERIATRICS | Facility: CLINIC | Age: 81
End: 2021-12-30
Payer: COMMERCIAL

## 2021-12-30 DIAGNOSIS — F41.9 ANXIETY: Primary | ICD-10-CM

## 2021-12-30 RX ORDER — LORAZEPAM 0.5 MG/1
0.25 TABLET ORAL 2 TIMES DAILY PRN
Qty: 30 TABLET | Refills: 5 | Status: SHIPPED | OUTPATIENT
Start: 2021-12-30

## 2022-01-11 PROCEDURE — U0005 INFEC AGEN DETEC AMPLI PROBE: HCPCS | Mod: ORL | Performed by: FAMILY MEDICINE

## 2022-01-12 ENCOUNTER — LAB REQUISITION (OUTPATIENT)
Dept: LAB | Facility: CLINIC | Age: 82
End: 2022-01-12
Payer: COMMERCIAL

## 2022-01-12 DIAGNOSIS — U07.1 COVID-19: ICD-10-CM

## 2022-01-12 LAB — SARS-COV-2 RNA RESP QL NAA+PROBE: NEGATIVE

## 2022-01-20 PROCEDURE — U0005 INFEC AGEN DETEC AMPLI PROBE: HCPCS | Mod: ORL | Performed by: FAMILY MEDICINE

## 2022-01-21 ENCOUNTER — LAB REQUISITION (OUTPATIENT)
Dept: LAB | Facility: CLINIC | Age: 82
End: 2022-01-21
Payer: COMMERCIAL

## 2022-01-21 DIAGNOSIS — U07.1 COVID-19: ICD-10-CM

## 2022-01-22 LAB — SARS-COV-2 RNA RESP QL NAA+PROBE: NEGATIVE

## 2022-01-24 ENCOUNTER — ASSISTED LIVING VISIT (OUTPATIENT)
Dept: GERIATRICS | Facility: CLINIC | Age: 82
End: 2022-01-24
Payer: COMMERCIAL

## 2022-01-24 VITALS
OXYGEN SATURATION: 95 % | RESPIRATION RATE: 16 BRPM | DIASTOLIC BLOOD PRESSURE: 56 MMHG | HEART RATE: 60 BPM | SYSTOLIC BLOOD PRESSURE: 135 MMHG

## 2022-01-24 DIAGNOSIS — I10 ESSENTIAL HYPERTENSION: ICD-10-CM

## 2022-01-24 DIAGNOSIS — M54.50 MIDLINE LOW BACK PAIN WITHOUT SCIATICA, UNSPECIFIED CHRONICITY: ICD-10-CM

## 2022-01-24 DIAGNOSIS — F32.A DEPRESSION, UNSPECIFIED DEPRESSION TYPE: Primary | ICD-10-CM

## 2022-01-24 NOTE — PROGRESS NOTES
[place] [facility]  [place] Medical Record Number:  [id number]  Place of Service where encounter took place:  [facility] [employer] [facility]  [facility] [[id number]]  Chief Complaint   Patient presents with     Depression       HPI:    Judith M Trierweiler  is a 81 year old (1940), who is being seen today for an episodic care visit.  HPI information obtained from: facility chart records, facility staff, patient report and [facility] chart review. Today's concern is: depression, HTN, back pain.  For depression cont on remeron. Per staff had episode of increased agitation, refusal of cares 1/21/22, made statement of wanting to commit suicide. Later in day, mood more stable. Denied suicidal ideation.  Today does not recall claim of wanting to harm self. Has memory loss due to dementia.  Cont. On risperdal, prn ativan. BPs stable. Cont. On lopressor.  Lisinopril, norvasc dc'd last hosp. Stay due to low BPs,  Per staff, has adequate po intake. No recent reports of increased low back pain. Cont. On tylenol, aspercreme, gabapentin.       Past Medical and Surgical History reviewed in Epic today.    MEDICATIONS:          REVIEW OF SYSTEMS:  Unobtainable secondary to cognitive impairment. Reports spirits as good, denies suicidal ideation    Objective:  /56   Pulse 60   Resp 16   SpO2 95%   Exam:  GENERAL APPEARANCE:  Alert, in no distress  ENT:  Mouth and posterior oropharynx normal, moist mucous membranes, [place]  EYES:  EOM, conjunctivae, lids, pupils and irises normal, PERRL  RESP:  respiratory effort and palpation of chest normal, lungs clear to auscultation , no respiratory distress  CV:  Palpation and auscultation of heart done , regular rate and rhythm, no murmur, rub, or gallop, no edema  ABDOMEN:  normal bowel sounds, soft, nontender, no hepatosplenomegaly or other masses, no guarding or rebound  M/S:   muscle strength 5/5 UEs, gen. LE weakness, normal muscle tone  NEURO:   Cranial nerves 2-12  are normal tested and grossly at patient's baseline, no tremor  PSYCH:  insight and judgement impaired, memory impaired , affect and mood normal    Labs:     Most Recent 3 BMP's:Recent Labs   Lab Test 08/18/21  1327 06/15/21  1031 11/19/20  1455    140 139   POTASSIUM 4.7 4.4 4.3   CHLORIDE 110* 109* 105   CO2 23 23 24   BUN 40* 26 38*   CR 1.46* 1.15* 1.84*   ANIONGAP 7 8 10   SLIME 9.7 9.1 9.6   GLC 84 66* 101       ASSESSMENT/PLAN:  (F32.A) Depression, unspecified depression type  (primary encounter diagnosis)  Comment: mood stable today. Denies current suicidal ideation.  Increased agitation aggressive behaviors at times with refusal of cares.  dementia  Plan: 1. Cont. remeron  2. Cont. Risperdal, ativan  3. Monitor for suicidal ideation, may consider increased remeron dose  4. Follow po intake, wt.s    (I10) Essential hypertension  Comment: BPs stable. Norvasc, lisinopril discontinued  Plan: 1. Cont. Lopressor  2. Follow BPs, HRs  3. For elevated BPs, may restart norvasc  4. Bmp in next 1-3 mos    (M54.50) Midline low back pain without sciatica, unspecified chronicity  Comment: currently stable  Plan: 1. Cont. Tylenol  2. Cont. Aspercreme, gabapentin  3. Encourage increased act., sitting up more during the day  4. Monitor for hs back pain, insomnia    Electronically signed by:  JAYLAN Morales CNP

## 2022-01-24 NOTE — LETTER
1/24/2022        RE: Judith M Trierweiler  [address] [place] [address]        [place] GERIATRIC SERVICES  [place] Medical Record Number:  [id number]  Place of Service where encounter took place:  [facility] [employer]  [place] ([facility]) [[id number]]  Chief Complaint   Patient presents with     Depression       HPI:    Judith M Trierweiler  is a 81 year old (1940), who is being seen today for an episodic care visit.  HPI information obtained from: facility chart records, facility staff, patient report and [facility] chart review. Today's concern is: depression, HTN, back pain.  For depression cont on remeron. Per staff had episode of increased agitation, refusal of cares 1/21/22, made statement of wanting to commit suicide. Later in day, mood more stable. Denied suicidal ideation.  Today does not recall claim of wanting to harm self. Has memory loss due to dementia.  Cont. On risperdal, prn ativan. BPs stable. Cont. On lopressor.  Lisinopril, norvasc dc'd last hosp. Stay due to low BPs,  Per staff, has adequate po intake. No recent reports of increased low back pain. Cont. On tylenol, aspercreme, gabapentin.       Past Medical and Surgical History reviewed in Epic today.    MEDICATIONS:          REVIEW OF SYSTEMS:  Unobtainable secondary to cognitive impairment. Reports spirits as good, denies suicidal ideation    Objective:  /56   Pulse 60   Resp 16   SpO2 95%   Exam:  GENERAL APPEARANCE:  Alert, in no distress  ENT:  Mouth and posterior oropharynx normal, moist mucous membranes, [place]  EYES:  EOM, conjunctivae, lids, pupils and irises normal, PERRL  RESP:  respiratory effort and palpation of chest normal, lungs clear to auscultation , no respiratory distress  CV:  Palpation and auscultation of heart done , regular rate and rhythm, no murmur, rub, or gallop, no edema  ABDOMEN:  normal bowel sounds, soft, nontender, no hepatosplenomegaly or other masses, no guarding or rebound  M/S:   muscle  strength 5/5 UEs, gen. LE weakness, normal muscle tone  NEURO:   Cranial nerves 2-12 are normal tested and grossly at patient's baseline, no tremor  PSYCH:  insight and judgement impaired, memory impaired , affect and mood normal    Labs:     Most Recent 3 BMP's:Recent Labs   Lab Test 08/18/21  1327 06/15/21  1031 11/19/20  1455    140 139   POTASSIUM 4.7 4.4 4.3   CHLORIDE 110* 109* 105   CO2 23 23 24   BUN 40* 26 38*   CR 1.46* 1.15* 1.84*   ANIONGAP 7 8 10   SLIME 9.7 9.1 9.6   GLC 84 66* 101       ASSESSMENT/PLAN:  (F32.A) Depression, unspecified depression type  (primary encounter diagnosis)  Comment: mood stable today. Denies current suicidal ideation.  Increased agitation aggressive behaviors at times with refusal of cares.  dementia  Plan: 1. Cont. remeron  2. Cont. Risperdal, ativan  3. Monitor for suicidal ideation, may consider increased remeron dose  4. Follow po intake, wt.s    (I10) Essential hypertension  Comment: BPs stable. Norvasc, lisinopril discontinued  Plan: 1. Cont. Lopressor  2. Follow BPs, HRs  3. For elevated BPs, may restart norvasc  4. Bmp in next 1-3 mos    (M54.50) Midline low back pain without sciatica, unspecified chronicity  Comment: currently stable  Plan: 1. Cont. Tylenol  2. Cont. Aspercreme, gabapentin  3. Encourage increased act., sitting up more during the day  4. Monitor for hs back pain, insomnia    Electronically signed by:  JAYLAN Morales CNP                 Sincerely,        JAYLAN Morales CNP

## 2022-02-21 ENCOUNTER — ASSISTED LIVING VISIT (OUTPATIENT)
Dept: GERIATRICS | Facility: CLINIC | Age: 82
End: 2022-02-21
Payer: COMMERCIAL

## 2022-02-21 VITALS
OXYGEN SATURATION: 90 % | HEART RATE: 71 BPM | DIASTOLIC BLOOD PRESSURE: 68 MMHG | SYSTOLIC BLOOD PRESSURE: 136 MMHG | RESPIRATION RATE: 16 BRPM

## 2022-02-21 DIAGNOSIS — I10 ESSENTIAL HYPERTENSION: Primary | ICD-10-CM

## 2022-02-21 DIAGNOSIS — G30.1 LATE ONSET ALZHEIMER'S DISEASE WITH BEHAVIORAL DISTURBANCE (H): ICD-10-CM

## 2022-02-21 DIAGNOSIS — M62.81 GENERALIZED MUSCLE WEAKNESS: ICD-10-CM

## 2022-02-21 DIAGNOSIS — F02.818 LATE ONSET ALZHEIMER'S DISEASE WITH BEHAVIORAL DISTURBANCE (H): ICD-10-CM

## 2022-02-21 RX ORDER — HALOPERIDOL 0.5 MG/1
0.5 TABLET ORAL 3 TIMES DAILY PRN
COMMUNITY

## 2022-02-21 RX ORDER — MORPHINE SULFATE 30 MG/1
2.5 TABLET ORAL
COMMUNITY

## 2022-02-21 NOTE — PROGRESS NOTES
[place] GERIATRIC SERVICES  [place] Medical Record Number:  [id number]  Place of Service where encounter took place:  [facility] [employer]  [facility] [[id number]]  Chief Complaint   Patient presents with     Hypertension     Dementia       HPI:    Judith M Trierweiler  is a 81 year old (1940), who is being seen today for an episodic care visit.  HPI information obtained from: facility chart records, facility staff, patient report and [facility] chart review. Today's concern is: HTN, alzheimer's, weakness. Started on hospice cares last week. Has had decreased act. Level, increased gen. Weakness. Decrease in po intake.  Has refused meds. Per staff has recent h/o aggressive behaviors-striking out at staff, throwing utensils in dining room.  Has risperdal, however, refusing meds. Hospice did start prn ABH gel.  Hospice to see again in 2 days.  BPs stable.  Refusing norvasc, metoprolol.       Past Medical and Surgical History reviewed in Epic today.    MEDICATIONS:          REVIEW OF SYSTEMS:  Unobtainable secondary to cognitive impairment. Reports feeling ok today    Objective:  /68   Pulse 71   Resp 16   SpO2 90%   Exam:  GENERAL APPEARANCE:  Alert, in no distress  ENT:  Mouth and posterior oropharynx normal, moist mucous membranes, [place]  EYES:  EOM, conjunctivae, lids, pupils and irises normal, PERRL  RESP:  respiratory effort and palpation of chest normal, lungs clear to auscultation , no respiratory distress  CV:  Palpation and auscultation of heart done , regular rate and rhythm, no murmur, rub, or gallop, no edema  ABDOMEN:  normal bowel sounds, soft, nontender, no hepatosplenomegaly or other masses, no guarding or rebound  M/S:   muscle strength 5/5 UEs, gen LE weakness, no contractures  NEURO:   Cranial nerves 2-12 are normal tested and grossly at patient's baseline, speech clear  PSYCH:  insight and judgement impaired, memory impaired , affect abnormal -flat    Labs:     Most Recent 3  BMP's:Recent Labs   Lab Test 08/18/21  1327 06/15/21  1031 11/19/20  1455    140 139   POTASSIUM 4.7 4.4 4.3   CHLORIDE 110* 109* 105   CO2 23 23 24   BUN 40* 26 38*   CR 1.46* 1.15* 1.84*   ANIONGAP 7 8 10   SLIME 9.7 9.1 9.6   GLC 84 66* 101       ASSESSMENT/PLAN:  (I10) Essential hypertension  (primary encounter diagnosis)  Comment: BPs gen. Stable. Refusing norvasc, metoprolol  Plan: 1. Hospice to see, may discontinue all non-comfort meds   2. Follow BPs, HRs  3. Encourage fluids    (G30.1,  F02.81) Late onset Alzheimer's disease with behavioral disturbance (H)  Comment: h/o aggressive behaviors, decreased po intake, refusal of meds  Plan: 1. Cont. ABH gel 0.5-25-0.5/ml tid prn  2. May consider sched. Noon dose  3. Cont. Prn MS for pain  4. Cont. Prn ativan    (M62.81) Generalized muscle weakness  Comment: ongoing.  No recent falls  Plan: 1. Has hosp. Bed in room  2. broda chair as needed  3. Monitor for skin breakdown    Electronically signed by:  JAYLAN Morales CNP

## 2022-02-21 NOTE — LETTER
2/21/2022        RE: Judith M Trierweiler  [address]  [place] [address]        [place] GERIATRIC SERVICES  [place] Medical Record Number:  [id number]  Place of Service where encounter took place:  [place]  [employer] ([facility]) [[id number]]  Chief Complaint   Patient presents with     Hypertension     Dementia       HPI:    Judith M Trierweiler  is a 81 year old (1940), who is being seen today for an episodic care visit.  HPI information obtained from: facility chart records, facility staff, patient report and [facility] chart review. Today's concern is: HTN, alzheimer's, weakness. Started on hospice cares last week. Has had decreased act. Level, increased gen. Weakness. Decrease in po intake.  Has refused meds. Per staff has recent h/o aggressive behaviors-striking out at staff, throwing utensils in dining room.  Has risperdal, however, refusing meds. Hospice did start prn ABH gel.  Hospice to see again in 2 days.  BPs stable.  Refusing norvasc, metoprolol.       Past Medical and Surgical History reviewed in Epic today.    MEDICATIONS:          REVIEW OF SYSTEMS:  Unobtainable secondary to cognitive impairment. Reports feeling ok today    Objective:  /68   Pulse 71   Resp 16   SpO2 90%   Exam:  GENERAL APPEARANCE:  Alert, in no distress  ENT:  Mouth and posterior oropharynx normal, moist mucous membranes, [place]  EYES:  EOM, conjunctivae, lids, pupils and irises normal, PERRL  RESP:  respiratory effort and palpation of chest normal, lungs clear to auscultation , no respiratory distress  CV:  Palpation and auscultation of heart done , regular rate and rhythm, no murmur, rub, or gallop, no edema  ABDOMEN:  normal bowel sounds, soft, nontender, no hepatosplenomegaly or other masses, no guarding or rebound  M/S:   muscle strength 5/5 UEs, gen LE weakness, no contractures  NEURO:   Cranial nerves 2-12 are normal tested and grossly at patient's baseline, speech clear  PSYCH:  insight and  judgement impaired, memory impaired , affect abnormal -flat    Labs:     Most Recent 3 BMP's:Recent Labs   Lab Test 08/18/21  1327 06/15/21  1031 11/19/20  1455    140 139   POTASSIUM 4.7 4.4 4.3   CHLORIDE 110* 109* 105   CO2 23 23 24   BUN 40* 26 38*   CR 1.46* 1.15* 1.84*   ANIONGAP 7 8 10   SLIME 9.7 9.1 9.6   GLC 84 66* 101       ASSESSMENT/PLAN:  (I10) Essential hypertension  (primary encounter diagnosis)  Comment: BPs gen. Stable. Refusing norvasc, metoprolol  Plan: 1. Hospice to see, may discontinue all non-comfort meds   2. Follow BPs, HRs  3. Encourage fluids    (G30.1,  F02.81) Late onset Alzheimer's disease with behavioral disturbance (H)  Comment: h/o aggressive behaviors, decreased po intake, refusal of meds  Plan: 1. Cont. ABH gel 0.5-25-0.5/ml tid prn  2. May consider sched. Noon dose  3. Cont. Prn MS for pain  4. Cont. Prn ativan    (M62.81) Generalized muscle weakness  Comment: ongoing.  No recent falls  Plan: 1. Has hosp. Bed in room  2. broda chair as needed  3. Monitor for skin breakdown    Electronically signed by:  JAYLAN Morales CNP                 Sincerely,        JAYLAN Morales CNP

## 2022-03-07 DIAGNOSIS — N89.8 VAGINAL DRYNESS: Primary | ICD-10-CM

## 2022-03-16 DIAGNOSIS — I10 ESSENTIAL HYPERTENSION: ICD-10-CM

## 2022-03-16 DIAGNOSIS — K21.00 GASTROESOPHAGEAL REFLUX DISEASE WITH ESOPHAGITIS, UNSPECIFIED WHETHER HEMORRHAGE: ICD-10-CM

## 2022-03-17 RX ORDER — METOPROLOL TARTRATE 25 MG/1
TABLET, FILM COATED ORAL
Qty: 62 TABLET | Refills: 97 | Status: SHIPPED | OUTPATIENT
Start: 2022-03-17

## 2022-03-22 ENCOUNTER — PATIENT OUTREACH (OUTPATIENT)
Dept: GERIATRIC MEDICINE | Facility: CLINIC | Age: 82
End: 2022-03-22
Payer: OTHER MISCELLANEOUS

## 2022-04-18 ENCOUNTER — ASSISTED LIVING VISIT (OUTPATIENT)
Dept: GERIATRICS | Facility: CLINIC | Age: 82
End: 2022-04-18
Payer: OTHER MISCELLANEOUS

## 2022-04-18 ENCOUNTER — LAB REQUISITION (OUTPATIENT)
Dept: LAB | Facility: CLINIC | Age: 82
End: 2022-04-18
Payer: MEDICARE

## 2022-04-18 VITALS
TEMPERATURE: 97.8 F | RESPIRATION RATE: 20 BRPM | HEART RATE: 87 BPM | SYSTOLIC BLOOD PRESSURE: 131 MMHG | OXYGEN SATURATION: 98 % | DIASTOLIC BLOOD PRESSURE: 68 MMHG

## 2022-04-18 DIAGNOSIS — U07.1 COVID-19: ICD-10-CM

## 2022-04-18 DIAGNOSIS — R05.9 COUGH: Primary | ICD-10-CM

## 2022-04-18 DIAGNOSIS — F41.9 ANXIETY: ICD-10-CM

## 2022-04-18 DIAGNOSIS — M54.50 MIDLINE LOW BACK PAIN WITHOUT SCIATICA, UNSPECIFIED CHRONICITY: ICD-10-CM

## 2022-04-18 PROCEDURE — U0005 INFEC AGEN DETEC AMPLI PROBE: HCPCS | Mod: ORL | Performed by: NURSE PRACTITIONER

## 2022-04-18 NOTE — PROGRESS NOTES
[place] GERIATRIC SERVICES  [place] Medical Record Number:  [id number]  Place of Service where encounter took place:  [facility] [employer]  [facility] ([facility]) [[id number]]  Chief Complaint   Patient presents with     Cough     Anxiety       HPI:    Judith M Trierweiler  is a 81 year old (1940), who is being seen today for an episodic care visit.  HPI information obtained from: facility chart records, facility staff, patient report and [facility] chart review. Today's concern is: cough, anxiety, back pain.  Has had newer cough. Wheezing. Afebrile.  Cont. On mucinex.  Cont. On  Hospice cares. Per staff has had decrease in act. Level. Does get up to w.c. no reports of increased low back pain.  Cont on lidoderm patch, tylenol,  Prn MS. For anxiety cont on  Risperdal, prn ativan, haldol.  Has hs remeron. occ agitation with cares. No recent reports of increased aggressive behaviors.     Past Medical and Surgical History reviewed in Epic today.    MEDICATIONS:          REVIEW OF SYSTEMS:  Unobtainable secondary to cognitive impairment. Reports cough today    Objective:  /68   Pulse 87   Temp 97.8  F (36.6  C)   Resp 20   SpO2 98%   Exam:  GENERAL APPEARANCE:  cooperative, sleepy  ENT:  Mouth and posterior oropharynx normal, moist mucous membranes, [place]  EYES:  EOM, conjunctivae, lids, pupils and irises normal, PERRL  RESP:  diminished breath sounds bibasilar, exp wheezing thoughout, cough present. no accessory muscle use  CV:  Palpation and auscultation of heart done , regular rate and rhythm, no murmur, rub, or gallop, no edema  ABDOMEN:  normal bowel sounds, soft, nontender, no hepatosplenomegaly or other masses, no guarding or rebound  M/S:   muscle strength 5/5 UEs, gen. LE weakness. no contractures  NEURO:   Cranial nerves 2-12 are normal tested and grossly at patient's baseline, no tremor  PSYCH:  insight and judgement impaired, memory impaired , affect abnormal -flat    Labs:     Most Recent 3  BMP's:Recent Labs   Lab Test 08/18/21  1327 06/15/21  1031 11/19/20  1455    140 139   POTASSIUM 4.7 4.4 4.3   CHLORIDE 110* 109* 105   CO2 23 23 24   BUN 40* 26 38*   CR 1.46* 1.15* 1.84*   ANIONGAP 7 8 10   SLIME 9.7 9.1 9.6   GLC 84 66* 101       ASSESSMENT/PLAN:  (R05.9) Cough  (primary encounter diagnosis)  Comment: newer onset, exp. Wheezes. afebrile  Plan: 1. PCR for covid  2. Cont. mucinex  3. Follow temps, O2 sats  4. For resp. Distress, use prn MS  5. Hospice to see tomorrow    (F41.9) Anxiety  Comment: increased s/s at times, typically surrounding cares.  No recent increase in aggressive behaviors  Plan: 1. Cont. Risperdal, remeron  2. Cont. Prn ativan, haldol  3. Monitor for aggressive behaviors      (M54.50) Midline low back pain without sciatica, unspecified chronicity  Comment: chronic, no recent reports of increased s/s.  Has had decreased act. Level past few days, likely r/t #1  Plan: 1. Cont. Current pain meds  2. Cont. Hosp. Bed  3. Encourage to sit up in w.c. as michael  4. Cont. Prn MS for sig. pain    Electronically signed by:  Rylan Farris, JAYLAN CNP

## 2022-04-18 NOTE — LETTER
4/18/2022        RE: Judith M Trierweiler  [address]  [place] [address]        [place] GERIATRIC [facility]  [place] Medical Record Number:  [id number]  Place of Service where encounter took place:  [facility] [employer]  [place] ([facility]) [[id number]]  Chief Complaint   Patient presents with     Cough     Anxiety       HPI:    Judith M Trierweiler  is a 81 year old (1940), who is being seen today for an episodic care visit.  HPI information obtained from: facility chart records, facility staff, patient report and [facility] chart review. Today's concern is: cough, anxiety, back pain.  Has had newer cough. Wheezing. Afebrile.  Cont. On mucinex.  Cont. On  Hospice cares. Per staff has had decrease in act. Level. Does get up to w.c. no reports of increased low back pain.  Cont on lidoderm patch, tylenol,  Prn MS. For anxiety cont on  Risperdal, prn ativan, haldol.  Has hs remeron. occ agitation with cares. No recent reports of increased aggressive behaviors.     Past Medical and Surgical History reviewed in Epic today.    MEDICATIONS:          REVIEW OF SYSTEMS:  Unobtainable secondary to cognitive impairment. Reports cough today    Objective:  /68   Pulse 87   Temp 97.8  F (36.6  C)   Resp 20   SpO2 98%   Exam:  GENERAL APPEARANCE:  cooperative, sleepy  ENT:  Mouth and posterior oropharynx normal, moist mucous membranes, [place]  EYES:  EOM, conjunctivae, lids, pupils and irises normal, PERRL  RESP:  diminished breath sounds bibasilar, exp wheezing thoughout, cough present. no accessory muscle use  CV:  Palpation and auscultation of heart done , regular rate and rhythm, no murmur, rub, or gallop, no edema  ABDOMEN:  normal bowel sounds, soft, nontender, no hepatosplenomegaly or other masses, no guarding or rebound  M/S:   muscle strength 5/5 UEs, gen. LE weakness. no contractures  NEURO:   Cranial nerves 2-12 are normal tested and grossly at patient's baseline, no tremor  PSYCH:  insight and  judgement impaired, memory impaired , affect abnormal -flat    Labs:     Most Recent 3 BMP's:Recent Labs   Lab Test 08/18/21  1327 06/15/21  1031 11/19/20  1455    140 139   POTASSIUM 4.7 4.4 4.3   CHLORIDE 110* 109* 105   CO2 23 23 24   BUN 40* 26 38*   CR 1.46* 1.15* 1.84*   ANIONGAP 7 8 10   SLIME 9.7 9.1 9.6   GLC 84 66* 101       ASSESSMENT/PLAN:  (R05.9) Cough  (primary encounter diagnosis)  Comment: newer onset, exp. Wheezes. afebrile  Plan: 1. PCR for covid  2. Cont. mucinex  3. Follow temps, O2 sats  4. For resp. Distress, use prn MS  5. Hospice to see tomorrow    (F41.9) Anxiety  Comment: increased s/s at times, typically surrounding cares.  No recent increase in aggressive behaviors  Plan: 1. Cont. Risperdal, remeron  2. Cont. Prn ativan, haldol  3. Monitor for aggressive behaviors      (M54.50) Midline low back pain without sciatica, unspecified chronicity  Comment: chronic, no recent reports of increased s/s.  Has had decreased act. Level past few days, likely r/t #1  Plan: 1. Cont. Current pain meds  2. Cont. Hosp. Bed  3. Encourage to sit up in w.c. as michael  4. Cont. Prn MS for sig. pain    Electronically signed by:  JAYLAN Morales CNP                 Sincerely,        JAYLAN Morales CNP

## 2022-04-19 LAB — SARS-COV-2 RNA RESP QL NAA+PROBE: NEGATIVE

## 2022-04-26 ENCOUNTER — PATIENT OUTREACH (OUTPATIENT)
Dept: GERIATRIC MEDICINE | Facility: CLINIC | Age: 82
End: 2022-04-26
Payer: OTHER MISCELLANEOUS

## 2022-05-01 ENCOUNTER — HEALTH MAINTENANCE LETTER (OUTPATIENT)
Age: 82
End: 2022-05-01

## 2022-06-04 NOTE — PROGRESS NOTES
3-22-22  CC received notice from [employer] that member as behind on premiums with the health plan so needed to be dis-enrolled as of 4-1.      [employer] Medicare Disenrollment    Member was disenrolled from [employer] Medicare effective: 4-1-22  Reason for disenrollment: did not pay premium for policy   Pam GOMEZ   [facility] Care Coordinator   [phone] - [facility]wzw cell phone   [phone] - owwk fax

## 2022-06-04 NOTE — PROGRESS NOTES
4-26-22  [employer] reports that member is now reinstated with the health plan that payments have been made so re- enrolled as of 4-1-22.     Pam Brown MA [facility] Care Coordinator   [phone] - ozht cell phone   [phone] - pdkx fax

## 2022-06-21 DIAGNOSIS — R21 RASH: Primary | ICD-10-CM

## 2022-06-21 RX ORDER — ANORECTAL OINTMENT 15.7; .44; 24; 20.6 G/100G; G/100G; G/100G; G/100G
OINTMENT TOPICAL
Qty: 113 G | Refills: 11 | Status: SHIPPED | OUTPATIENT
Start: 2022-06-21

## 2022-07-07 ENCOUNTER — ASSISTED LIVING VISIT (OUTPATIENT)
Dept: GERIATRICS | Facility: CLINIC | Age: 82
End: 2022-07-07
Payer: OTHER MISCELLANEOUS

## 2022-07-07 VITALS
RESPIRATION RATE: 16 BRPM | HEART RATE: 57 BPM | OXYGEN SATURATION: 94 % | DIASTOLIC BLOOD PRESSURE: 77 MMHG | SYSTOLIC BLOOD PRESSURE: 142 MMHG

## 2022-07-07 DIAGNOSIS — I10 ESSENTIAL HYPERTENSION: ICD-10-CM

## 2022-07-07 DIAGNOSIS — F41.9 ANXIETY: ICD-10-CM

## 2022-07-07 DIAGNOSIS — R53.83 FATIGUE, UNSPECIFIED TYPE: Primary | ICD-10-CM

## 2022-07-07 RX ORDER — AMLODIPINE BESYLATE 5 MG/1
5 TABLET ORAL DAILY
COMMUNITY

## 2022-07-07 NOTE — LETTER
7/7/2022        RE: Judith M Trierweiler  [address]  [place] [address]        [place] GERIATRIC [facility]  [place] Medical Record Number:  [id number]  [facility]  [employer] ([facility]) [[id number]]  Chief Complaint   Patient presents with     Fatigue       HPI:    Judith M Trierweiler  is a 81 year old (1940), who is being seen today for an episodic care visit.  HPI information obtained from: facility chart records, facility staff, patient report, [facility] chart review and family/first contact dtr report. Today's concern is: fatigue, anxiety, HTN.  Has had ongoing am fatigue, sleeps late in am.  Has some ongoing daytime sleepiness. Per dtr, did not talk yesterday. Staff reports resident was talking later in afternoon, Neuros baseline. Does have h/o CVA.  Cont. On asa. For anxiety cont. On ativan, risperdal, remeron.  No recent increase in s/s. BP sl elevated today. Cont. On norvasc, metoprolol. Per staff, has adequate fluid intake.       Past Medical and Surgical History reviewed in Epic today.    MEDICATIONS:          REVIEW OF SYSTEMS:  Unobtainable secondary to cognitive impairment. Reports feeling tired    Objective:  BP (!) 142/77   Pulse 57   Resp 16   SpO2 94%   Exam:  GENERAL APPEARANCE:  in no distress, cooperative, sleepy  ENT:  Mouth and posterior oropharynx normal, moist mucous membranes, [place]  EYES:  EOM, conjunctivae, lids, pupils and irises normal, PERRL  NECK:  no carotid bruit  RESP:  respiratory effort and palpation of chest normal, lungs clear to auscultation , no respiratory distress  CV:  Palpation and auscultation of heart done , regular rate and rhythm, no murmur, rub, or gallop, no edema  ABDOMEN:  normal bowel sounds, soft, nontender, no hepatosplenomegaly or other masses, no guarding or rebound  M/S:   muscle strength 5/5 UEs, equal bilat.  gen. LE weakness  NEURO:   Cranial nerves 2-12 are normal tested and grossly at  patient's baseline, speech clear, answers questions appropriately.  PSYCH:  insight and judgement impaired, memory impaired , affect abnormal -flat    Labs:     Most Recent 3 BMP's:Recent Labs   Lab Test 08/18/21  1327 06/15/21  1031 11/19/20  1455    140 139   POTASSIUM 4.7 4.4 4.3   CHLORIDE 110* 109* 105   CO2 23 23 24   BUN 40* 26 38*   CR 1.46* 1.15* 1.84*   ANIONGAP 7 8 10   SLIME 9.7 9.1 9.6   GLC 84 66* 101       ASSESSMENT/PLAN:  (R53.83) Fatigue, unspecified type  (primary encounter diagnosis)  Comment: ongoing. Sleeps late in am.  Some daytime sleepiness. Does participate in activities at times.  Period of not talking during dtr's visit yesterday.  Neuros appear baseline.    Plan: 1. Cont. Hospice cares  2. Monitor for changes in Neuros  3. Cont. Asa  4. Cont. Hs remeron, monitor for reports of insomnia    (F41.9) Anxiety  Comment: gen. Stable.  Plan: 1. Cont. Ativan, risperdal  2. Monitor for increased anxiety, agitation, aggressive behaviors  3. For increase in target behaviors utilize prn haldol  4. Encourage participation in activities, time out of apt.    (I10) Essential hypertension  Comment: BP s. Elevated today  Plan: 1. Cont. Norvasc, metoprolol  2. Follow BPs, HRs  3. Monitor for decrease in fluids      Electronically signed by:  JAYLAN Morales CNP                 Sincerely,        JAYLAN Morales CNP

## 2022-07-07 NOTE — PROGRESS NOTES
[facility]  [place] Medical Record Number:  [id number]  Place of Service where encounter took place:  [facility] [employer] [facility]  [facility] ([facility]) [[id number]]  Chief Complaint   Patient presents with     Fatigue       HPI:    Judith M Trierweiler  is a 81 year old (1940), who is being seen today for an episodic care visit.  HPI information obtained from: facility chart records, facility staff, patient report, [facility] chart review and family/first contact dtr report. Today's concern is: fatigue, anxiety, HTN.  Has had ongoing am fatigue, sleeps late in am.  Has some ongoing daytime sleepiness. Per dtr, did not talk yesterday. Staff reports resident was talking later in afternoon, Neuros baseline. Does have h/o CVA.  Cont. On asa. For anxiety cont. On ativan, risperdal, remeron.  No recent increase in s/s. BP sl elevated today. Cont. On norvasc, metoprolol. Per staff, has adequate fluid intake.       Past Medical and Surgical History reviewed in Epic today.    MEDICATIONS:          REVIEW OF SYSTEMS:  Unobtainable secondary to cognitive impairment. Reports feeling tired    Objective:  BP (!) 142/77   Pulse 57   Resp 16   SpO2 94%   Exam:  GENERAL APPEARANCE:  in no distress, cooperative, sleepy  ENT:  Mouth and posterior oropharynx normal, moist mucous membranes, [place]  EYES:  EOM, conjunctivae, lids, pupils and irises normal, PERRL  NECK:  no carotid bruit  RESP:  respiratory effort and palpation of chest normal, lungs clear to auscultation , no respiratory distress  CV:  Palpation and auscultation of heart done , regular rate and rhythm, no murmur, rub, or gallop, no edema  ABDOMEN:  normal bowel sounds, soft, nontender, no hepatosplenomegaly or other masses, no guarding or rebound  M/S:   muscle strength 5/5 UEs, equal bilat.  gen. LE weakness  NEURO:   Cranial nerves 2-12 are normal tested and grossly at patient's baseline, speech clear, answers questions appropriately.  PSYCH:  insight  and judgement impaired, memory impaired , affect abnormal -flat    Labs:     Most Recent 3 BMP's:Recent Labs   Lab Test 08/18/21  1327 06/15/21  1031 11/19/20  1455    140 139   POTASSIUM 4.7 4.4 4.3   CHLORIDE 110* 109* 105   CO2 23 23 24   BUN 40* 26 38*   CR 1.46* 1.15* 1.84*   ANIONGAP 7 8 10   SLIME 9.7 9.1 9.6   GLC 84 66* 101       ASSESSMENT/PLAN:  (R53.83) Fatigue, unspecified type  (primary encounter diagnosis)  Comment: ongoing. Sleeps late in am.  Some daytime sleepiness. Does participate in activities at times.  Period of not talking during dtr's visit yesterday.  Neuros appear baseline.    Plan: 1. Cont. Hospice cares  2. Monitor for changes in Neuros  3. Cont. Asa  4. Cont. Hs remeron, monitor for reports of insomnia    (F41.9) Anxiety  Comment: gen. Stable.  Plan: 1. Cont. Ativan, risperdal  2. Monitor for increased anxiety, agitation, aggressive behaviors  3. For increase in target behaviors utilize prn haldol  4. Encourage participation in activities, time out of apt.    (I10) Essential hypertension  Comment: BP s. Elevated today  Plan: 1. Cont. Norvasc, metoprolol  2. Follow BPs, HRs  3. Monitor for decrease in fluids      Electronically signed by:  JAYLAN Morales CNP

## 2022-07-14 ENCOUNTER — PATIENT OUTREACH (OUTPATIENT)
Dept: GERIATRIC MEDICINE | Facility: CLINIC | Age: 82
End: 2022-07-14

## 2022-07-14 NOTE — PROGRESS NOTES
[facility] [employer] Medicare Chart review      chart   No triggering events at this time   CM will follow-up as needed     Pam Brown MA [facility] Care Coordinator   [phone] - [employer]xe cell phone   [phone] - work fax

## 2022-08-09 DIAGNOSIS — F41.9 ANXIETY: ICD-10-CM

## 2022-08-10 RX ORDER — MIRTAZAPINE 7.5 MG/1
TABLET, FILM COATED ORAL
Qty: 90 TABLET | Refills: 97 | Status: SHIPPED | OUTPATIENT
Start: 2022-08-10

## 2022-09-08 ENCOUNTER — PATIENT OUTREACH (OUTPATIENT)
Dept: GERIATRIC MEDICINE | Facility: CLINIC | Age: 82
End: 2022-09-08

## 2022-09-08 NOTE — PROGRESS NOTES
Opening [facility] Medicare program and updating targets and tasks per [employer] launch.    Tami Adams  Care Management Specialist   [facility]   [phone]

## 2022-11-20 ENCOUNTER — HEALTH MAINTENANCE LETTER (OUTPATIENT)
Age: 82
End: 2022-11-20

## 2023-01-01 ENCOUNTER — PATIENT OUTREACH (OUTPATIENT)
Dept: GERIATRIC MEDICINE | Facility: CLINIC | Age: 83
End: 2023-01-01
Payer: OTHER MISCELLANEOUS

## 2023-02-14 ENCOUNTER — HOSPITAL ENCOUNTER (EMERGENCY)
Facility: CLINIC | Age: 83
End: 2023-02-14
Payer: OTHER MISCELLANEOUS

## 2023-06-02 ENCOUNTER — HEALTH MAINTENANCE LETTER (OUTPATIENT)
Age: 83
End: 2023-06-02

## 2023-06-22 NOTE — PROGRESS NOTES
[facility] Care Coordination Contact    Upon completing chart review, discovered member has not been seen by [facility] provider since 7/2022. Checked with E&E who was able to confirm that member discharged from [facility] on 8/2/22.     E&E submitted a CCRF to [facility]. Disenrollment effective 6/30/23.     Tara Harper RN  [facility]  Cell: [phone]

## 2024-06-22 ENCOUNTER — HEALTH MAINTENANCE LETTER (OUTPATIENT)
Age: 84
End: 2024-06-22